# Patient Record
Sex: FEMALE | Race: BLACK OR AFRICAN AMERICAN | NOT HISPANIC OR LATINO | Employment: OTHER | URBAN - METROPOLITAN AREA
[De-identification: names, ages, dates, MRNs, and addresses within clinical notes are randomized per-mention and may not be internally consistent; named-entity substitution may affect disease eponyms.]

---

## 2017-01-25 RX ORDER — FELODIPINE 5 MG/1
TABLET, EXTENDED RELEASE ORAL
Qty: 30 TABLET | Refills: 0 | Status: SHIPPED | OUTPATIENT
Start: 2017-01-25 | End: 2017-02-07 | Stop reason: SDUPTHER

## 2017-02-03 DIAGNOSIS — I10 BENIGN ESSENTIAL HYPERTENSION: ICD-10-CM

## 2017-02-03 RX ORDER — POTASSIUM CHLORIDE 20MEQ/15ML
LIQUID (ML) ORAL
Qty: 240 ML | Refills: 0 | Status: SHIPPED | OUTPATIENT
Start: 2017-02-03 | End: 2017-02-07 | Stop reason: SDUPTHER

## 2017-02-07 ENCOUNTER — OFFICE VISIT (OUTPATIENT)
Dept: FAMILY MEDICINE CLINIC | Facility: CLINIC | Age: 72
End: 2017-02-07

## 2017-02-07 VITALS
WEIGHT: 155 LBS | SYSTOLIC BLOOD PRESSURE: 158 MMHG | HEIGHT: 65 IN | BODY MASS INDEX: 25.83 KG/M2 | HEART RATE: 75 BPM | DIASTOLIC BLOOD PRESSURE: 78 MMHG | TEMPERATURE: 97.8 F | OXYGEN SATURATION: 98 %

## 2017-02-07 DIAGNOSIS — J30.9 ATOPIC RHINITIS: Primary | ICD-10-CM

## 2017-02-07 DIAGNOSIS — J01.00 ACUTE NON-RECURRENT MAXILLARY SINUSITIS: ICD-10-CM

## 2017-02-07 PROCEDURE — 99213 OFFICE O/P EST LOW 20 MIN: CPT | Performed by: NURSE PRACTITIONER

## 2017-02-07 RX ORDER — AMOXICILLIN 500 MG/1
1000 CAPSULE ORAL 2 TIMES DAILY
Qty: 28 CAPSULE | Refills: 0 | Status: SHIPPED | OUTPATIENT
Start: 2017-02-07 | End: 2017-04-11

## 2017-02-07 NOTE — PROGRESS NOTES
"Subjective   Radha Funk is a 70 y.o. female who is here with a possible sinus infection.     History of Present Illness   3week history of sinus congestion, taking zyrtec and singular. +hot, cold , and aching, increased throat pain and sinus pressure.     Women in family live to near 100, she is afraid to go home to [facility], as afraid will not be able to return, though is a citizen.  The following portions of the patient's history were reviewed and updated as appropriate: allergies, current medications, past family history, past medical history, past social history, past surgical history and problem list.    Review of Systems   Constitutional: Negative for chills and fever.   HENT: Positive for congestion, rhinorrhea, sinus pressure, sore throat and voice change. Negative for ear discharge, ear pain, facial swelling, hearing loss, nosebleeds, sneezing and trouble swallowing.    Respiratory: Positive for cough. Negative for shortness of breath and wheezing.    Cardiovascular: Negative.  Negative for chest pain.   Gastrointestinal: Negative for abdominal pain.   Endocrine: Negative.    Musculoskeletal: Negative for arthralgias.   Allergic/Immunologic: Negative for environmental allergies.   Neurological: Positive for headaches.     Visit Vitals   • /78 (BP Location: Left arm, Patient Position: Sitting, Cuff Size: Adult)   • Pulse 75   • Temp 97.8 °F (36.6 °C) (Oral)   • Ht 65\" (165.1 cm)   • Wt 155 lb (70.3 kg)   • SpO2 98%   • BMI 25.79 kg/m2   140/76    Objective   Physical Exam   Constitutional: She is oriented to person, place, and time. She appears well-developed and well-nourished.   HENT:   Right Ear: Tympanic membrane is retracted. A middle ear effusion is present.   Left Ear: Tympanic membrane is bulging. Tympanic membrane is not retracted. A middle ear effusion is present.   Nose: Mucosal edema and rhinorrhea present.   Mouth/Throat: Posterior oropharyngeal edema present.   Eyes: EOM are " normal. Pupils are equal, round, and reactive to light.   Neck: Normal range of motion. Neck supple. No JVD present. No tracheal deviation present. No thyromegaly present.   Cardiovascular: Normal rate, regular rhythm and normal heart sounds.    Pulmonary/Chest: Effort normal and breath sounds normal.   Abdominal: Soft. Bowel sounds are normal.   Lymphadenopathy:     She has cervical adenopathy.   Neurological: She is alert and oriented to person, place, and time.   Skin: Skin is warm and dry.   Psychiatric: She has a normal mood and affect. Her behavior is normal. Judgment and thought content normal.   Nursing note and vitals reviewed.    Assessment/Plan   Problems Addressed this Visit        Respiratory    Atopic rhinitis - Primary      Other Visit Diagnoses     Acute non-recurrent maxillary sinusitis        Relevant Medications    amoxicillin (AMOXIL) 500 MG capsule        Continue zyrtec and singular, hold decongestants. Cover for sinusitis. FU if not settling. And in 2 months for diabetes.

## 2017-02-24 RX ORDER — FELODIPINE 5 MG/1
TABLET, EXTENDED RELEASE ORAL
Qty: 30 TABLET | Refills: 0 | Status: SHIPPED | OUTPATIENT
Start: 2017-02-24 | End: 2017-03-27 | Stop reason: SDUPTHER

## 2017-02-24 RX ORDER — METOPROLOL SUCCINATE 25 MG/1
TABLET, EXTENDED RELEASE ORAL
Qty: 30 TABLET | Refills: 0 | Status: SHIPPED | OUTPATIENT
Start: 2017-02-24 | End: 2017-03-27 | Stop reason: SDUPTHER

## 2017-03-27 RX ORDER — METOPROLOL SUCCINATE 25 MG/1
TABLET, EXTENDED RELEASE ORAL
Qty: 30 TABLET | Refills: 0 | Status: SHIPPED | OUTPATIENT
Start: 2017-03-27 | End: 2017-04-11 | Stop reason: SDUPTHER

## 2017-03-27 RX ORDER — FELODIPINE 5 MG/1
TABLET, EXTENDED RELEASE ORAL
Qty: 30 TABLET | Refills: 0 | Status: SHIPPED | OUTPATIENT
Start: 2017-03-27 | End: 2017-04-11 | Stop reason: SDUPTHER

## 2017-03-27 RX ORDER — LOSARTAN POTASSIUM AND HYDROCHLOROTHIAZIDE 25; 100 MG/1; MG/1
TABLET ORAL
Qty: 30 TABLET | Refills: 0 | Status: SHIPPED | OUTPATIENT
Start: 2017-03-27 | End: 2017-04-11 | Stop reason: SDUPTHER

## 2017-04-06 RX ORDER — CLOPIDOGREL BISULFATE 75 MG/1
TABLET ORAL
Qty: 30 TABLET | Refills: 2 | Status: SHIPPED | OUTPATIENT
Start: 2017-04-06 | End: 2017-04-11 | Stop reason: SDUPTHER

## 2017-04-11 ENCOUNTER — OFFICE VISIT (OUTPATIENT)
Dept: FAMILY MEDICINE CLINIC | Facility: CLINIC | Age: 72
End: 2017-04-11

## 2017-04-11 VITALS
WEIGHT: 156 LBS | HEIGHT: 65 IN | DIASTOLIC BLOOD PRESSURE: 64 MMHG | OXYGEN SATURATION: 98 % | HEART RATE: 61 BPM | TEMPERATURE: 98.2 F | BODY MASS INDEX: 25.99 KG/M2 | SYSTOLIC BLOOD PRESSURE: 138 MMHG

## 2017-04-11 DIAGNOSIS — Z00.00 WELCOME TO MEDICARE PREVENTIVE VISIT: ICD-10-CM

## 2017-04-11 DIAGNOSIS — E11.29 TYPE 2 DIABETES MELLITUS WITH MICROALBUMINURIA, WITHOUT LONG-TERM CURRENT USE OF INSULIN (HCC): Primary | ICD-10-CM

## 2017-04-11 DIAGNOSIS — R80.9 TYPE 2 DIABETES MELLITUS WITH MICROALBUMINURIA, WITHOUT LONG-TERM CURRENT USE OF INSULIN (HCC): Primary | ICD-10-CM

## 2017-04-11 DIAGNOSIS — L20.84 INTRINSIC ECZEMA: ICD-10-CM

## 2017-04-11 DIAGNOSIS — Z91.09 ENVIRONMENTAL ALLERGIES: ICD-10-CM

## 2017-04-11 DIAGNOSIS — E78.5 DYSLIPIDEMIA: ICD-10-CM

## 2017-04-11 PROCEDURE — G0402 INITIAL PREVENTIVE EXAM: HCPCS | Performed by: NURSE PRACTITIONER

## 2017-04-11 PROCEDURE — 99213 OFFICE O/P EST LOW 20 MIN: CPT | Performed by: NURSE PRACTITIONER

## 2017-04-11 RX ORDER — CLOPIDOGREL BISULFATE 75 MG/1
75 TABLET ORAL DAILY
Qty: 30 TABLET | Refills: 5 | Status: SHIPPED | OUTPATIENT
Start: 2017-04-11 | End: 2018-01-02 | Stop reason: SDUPTHER

## 2017-04-11 RX ORDER — MONTELUKAST SODIUM 10 MG/1
10 TABLET ORAL NIGHTLY
Qty: 90 TABLET | Refills: 5 | Status: SHIPPED | OUTPATIENT
Start: 2017-04-11 | End: 2018-05-29 | Stop reason: SDUPTHER

## 2017-04-11 RX ORDER — ATORVASTATIN CALCIUM 40 MG/1
40 TABLET, FILM COATED ORAL NIGHTLY
Qty: 30 TABLET | Refills: 5 | Status: SHIPPED | OUTPATIENT
Start: 2017-04-11 | End: 2017-11-16 | Stop reason: SDUPTHER

## 2017-04-11 RX ORDER — METOPROLOL SUCCINATE 25 MG/1
25 TABLET, EXTENDED RELEASE ORAL DAILY
Qty: 30 TABLET | Refills: 5 | Status: SHIPPED | OUTPATIENT
Start: 2017-04-11 | End: 2017-10-18 | Stop reason: SDUPTHER

## 2017-04-11 RX ORDER — LOSARTAN POTASSIUM AND HYDROCHLOROTHIAZIDE 25; 100 MG/1; MG/1
1 TABLET ORAL DAILY
Qty: 30 TABLET | Refills: 5 | Status: SHIPPED | OUTPATIENT
Start: 2017-04-11 | End: 2017-10-18 | Stop reason: SDUPTHER

## 2017-04-11 RX ORDER — FELODIPINE 5 MG/1
5 TABLET, EXTENDED RELEASE ORAL DAILY
Qty: 30 TABLET | Refills: 5 | Status: SHIPPED | OUTPATIENT
Start: 2017-04-11 | End: 2017-10-31 | Stop reason: SDUPTHER

## 2017-04-11 RX ORDER — SELENIUM SULFIDE 2.5 MG/100ML
LOTION TOPICAL DAILY PRN
Qty: 118 ML | Refills: 0 | Status: SHIPPED | OUTPATIENT
Start: 2017-04-11 | End: 2022-04-22

## 2017-04-11 NOTE — PROGRESS NOTES
"Subjective   Radha Funk is a 70 y.o. female who presents for a follow up on diabetes, hypertension.     History of Present Illness   Diabetes well controlled. Weight about the same, still working out, but less due to transportation issues. Started back at gym last week.   The following portions of the patient's history were reviewed and updated as appropriate: allergies, current medications, past family history, past medical history, past social history, past surgical history and problem list.    Review of Systems   Constitutional: Negative.  Negative for activity change and unexpected weight change.   HENT: Negative.    Eyes: Negative.  Negative for visual disturbance.   Respiratory: Negative.    Cardiovascular: Negative.  Negative for chest pain.   Gastrointestinal: Negative.  Negative for constipation and diarrhea.   Endocrine: Negative.    Genitourinary: Negative for difficulty urinating and frequency.   Musculoskeletal: Negative.    Neurological: Negative for weakness and headaches.   Hematological: Negative.    Psychiatric/Behavioral: Negative.  Negative for dysphoric mood.     /64  Pulse 61  Temp 98.2 °F (36.8 °C) (Oral)   Ht 65\" (165.1 cm)  Wt 156 lb (70.8 kg)  SpO2 98%  BMI 25.96 kg/m2    Objective   Physical Exam   Constitutional: She appears well-developed and well-nourished.   HENT:   Head: Normocephalic and atraumatic.   Neck: Normal range of motion. Neck supple. No thyromegaly present.   Cardiovascular: Normal rate, regular rhythm and normal heart sounds.    Pulses:       Carotid pulses are 2+ on the right side, and 2+ on the left side.  Pulmonary/Chest: Effort normal and breath sounds normal.    Radha had a diabetic foot exam performed today.   During the foot exam she had a monofilament test performed (see scanned image).  Lymphadenopathy:     She has no cervical adenopathy.   Skin: Skin is dry. She is not diaphoretic.   Scaly dark patch under chin to shoulder "   Psychiatric: She has a normal mood and affect. Her behavior is normal. Judgment and thought content normal.     Assessment/Plan   Problems Addressed this Visit        Endocrine    Diabetes mellitus - Primary    Relevant Medications    metFORMIN (GLUCOPHAGE) 500 MG tablet    Other Relevant Orders    Hemoglobin A1c    Microalbumin / Creatinine Urine Ratio      Other Visit Diagnoses     Intrinsic eczema        Relevant Medications    selenium sulfide (SELSUN) 2.5 % shampoo    Dyslipidemia        Relevant Medications    atorvastatin (LIPITOR) 40 MG tablet    Environmental allergies        Relevant Medications    montelukast (SINGULAIR) 10 MG tablet        Eczema well controlled years, with selenium from another provider. Reasonable to rx. DM and HTN well controlled. Continue same medications. FU 6 months

## 2017-04-11 NOTE — PATIENT INSTRUCTIONS
Medicare Wellness  Personal Prevention Plan of Service     Date of Office Visit:  2017  Encounter Provider:  ALONDRA Alfaro  Place of Service:  [facility]  Patient Name: Radha Funk  :  10/7/1946    As part of the Medicare Wellness portion of your visit today, we are providing you with this personalized preventive plan of services (PPPS). This plan is based upon recommendations of the [place] Preventive Services Task Force (USPSTF) and the Advisory Committee on Immunization Practices (ACIP).    This lists the preventive care services that should be considered, and provides dates of when you are due. Items listed as completed are up-to-date and do not require any further intervention.    Health Maintenance   Topic Date Due   • TDAP/TD VACCINES (1 - Tdap) 10/07/1967   • HEPATITIS C SCREENING  2016   • ZOSTER VACCINE  2016   • DIABETIC EYE EXAM  2016   • HEMOGLOBIN A1C  2017   • PNEUMOCOCCAL VACCINES (65+ LOW/MEDIUM RISK) (2 of 2 - PPSV23) 2017   • LIPID PANEL  2017   • URINE MICROALBUMIN  2017   • MEDICARE ANNUAL WELLNESS  2018   • DIABETIC FOOT EXAM  2018   • MAMMOGRAM  2018   • COLONOSCOPY  2026   • INFLUENZA VACCINE  Addressed       Orders Placed This Encounter   Procedures   • Hemoglobin A1c   • Microalbumin / Creatinine Urine Ratio       No Follow-up on file.

## 2017-04-11 NOTE — PROGRESS NOTES
QUICK REFERENCE INFORMATION:  The ABCs of the Annual Wellness Visit    Welcome to Medicare Visit    HEALTH RISK ASSESSMENT    10/7/1946    Recent Hospitalizations:  No recent hospitalization(s)..      Current Medical Providers:  Patient Care Team:  ALONDRA Mims as PCP - General  ALONDRA Mims as PCP - Family Medicine      Smoking Status:  History   Smoking Status   • Never Smoker   Smokeless Tobacco   • Not on file       Alcohol Consumption:  History   Alcohol use Not on file       Depression Screen:   PHQ-9 Depression Screening 4/11/2017   Little interest or pleasure in doing things 0   Feeling down, depressed, or hopeless 0   Trouble falling or staying asleep, or sleeping too much 0   Feeling tired or having little energy 0   Poor appetite or overeating 0   Feeling bad about yourself - or that you are a failure or have let yourself or your family down 0   Trouble concentrating on things, such as reading the newspaper or watching television 0   Moving or speaking so slowly that other people could have noticed. Or the opposite - being so fidgety or restless that you have been moving around a lot more than usual 0   Thoughts that you would be better off dead, or of hurting yourself in some way 0   PHQ-9 Total Score 0       Health Habits and Functional and Cognitive Screening:  Functional & Cognitive Status 4/11/2017   Do you have difficulty preparing food and eating? No   Do you have difficulty bathing yourself? No   Do you have difficulty getting dressed? No   Do you have difficulty using the toilet? No   Do you have difficulty moving around from place to place? No   In the past year have you fallen or experienced a near fall? No   Do you need help using the phone?  No   Are you deaf or do you have serious difficulty hearing?  No   Do you need help with transportation? No   Do you need help shopping? No   Do you need help preparing meals?  No   Do you need help with housework?  No   Do you need  help with laundry? No   Do you need help taking your medications? No   Do you need help managing money? No   Do you have difficulty concentrating, remembering or making decisions? No       Health Habits  Current Diet: Well Balanced Diet  Dental Exam: Up to date  Eye Exam: Not up to date  Exercise (times per week): 3 times per week  Current Exercise Activities Include: Cardiovasular Workout on Exercise Equipment        Does the patient have evidence of cognitive impairment? NO    Aspirin use counseling? Taking ASA appropriately as indicated      Recent Lab Results:  CMP:  Lab Results   Component Value Date    GLU 98 09/20/2016    BUN 12 09/20/2016    CREATININE 0.76 09/20/2016    EGFRIFNONA 81 09/20/2016    EGFRIFAFRI 94 09/20/2016    BCR 16 09/20/2016     09/20/2016    K 3.9 09/20/2016    CO2 25 09/20/2016    CALCIUM 9.7 09/20/2016    PROTENTOTREF 8.6 (H) 09/20/2016    ALBUMIN 4.5 09/20/2016    LABGLOBREF 4.1 09/20/2016    LABIL2 1.1 09/20/2016    BILITOT 0.6 09/20/2016    ALKPHOS 78 09/20/2016    AST 37 09/20/2016    ALT 27 09/20/2016     Lipid Panel:  Lab Results   Component Value Date    CHLPL 115 09/20/2016    TRIG 148 09/20/2016    HDL 36 (L) 09/20/2016    VLDL 30 09/20/2016    LDL 49 09/20/2016     HbA1c:  Lab Results   Component Value Date    HGBA1C 6.2 (H) 09/20/2016       Visual Acuity:   Visual Acuity Screening    Right eye Left eye Both eyes   Without correction:      With correction: 20/25 20/30 20/25       Age-appropriate Screening Schedule:  Refer to the list below for future screening recommendations based on patient's age, sex and/or medical conditions. Orders for these recommended tests are listed in the plan section. The patient has been provided with a written plan.    Health Maintenance   Topic Date Due   • TDAP/TD VACCINES (1 - Tdap) 10/07/1967   • ZOSTER VACCINE  03/02/2016   • DIABETIC EYE EXAM  03/03/2016   • HEMOGLOBIN A1C  03/20/2017   • PNEUMOCOCCAL VACCINES (65+ LOW/MEDIUM RISK) (2  of 2 - PPSV23) 09/20/2017   • LIPID PANEL  09/20/2017   • URINE MICROALBUMIN  09/20/2017   • DIABETIC FOOT EXAM  04/11/2018   • MAMMOGRAM  09/08/2018   • COLONOSCOPY  09/20/2026   • INFLUENZA VACCINE  Addressed        Subjective   History of Present Illness    Radha Funk is a 70 y.o. female an established patient presenting for a Welcome to Medicare Visit.     The following portions of the patient's history were reviewed and updated as appropriate: allergies, current medications, past family history, past medical history, past social history, past surgical history and problem list.    Outpatient Medications Prior to Visit   Medication Sig Dispense Refill   • ARTIFICIAL TEARS 0.1-0.3 % solution Apply  to eye.     • aspirin 81 MG chewable tablet Chew daily.     • Cetirizine HCl 10 MG capsule Take  by mouth.     • atorvastatin (LIPITOR) 40 MG tablet TAKE ONE TABLET BY MOUTH ONCE DAILY AT BEDTIME 30 tablet 5   • clopidogrel (PLAVIX) 75 MG tablet TAKE ONE TABLET BY MOUTH ONCE DAILY 30 tablet 2   • felodipine (PLENDIL) 5 MG 24 hr tablet TAKE ONE TABLET BY MOUTH ONCE DAILY NEED  BLOOD  PRESSURE  CHECK 30 tablet 0   • losartan-hydrochlorothiazide (HYZAAR) 100-25 MG per tablet TAKE ONE TABLET BY MOUTH ONCE DAILY FOR BLOOD PRESSURE 30 tablet 0   • metFORMIN (GLUCOPHAGE) 500 MG tablet TAKE ONE TABLET BY MOUTH EVERY 12 HOURS WITH FOOD 60 tablet 0   • metoprolol succinate XL (TOPROL-XL) 25 MG 24 hr tablet TAKE ONE TABLET BY MOUTH ONCE DAILY 30 tablet 0   • montelukast (SINGULAIR) 10 MG tablet Take 1 tablet by mouth every night. 90 tablet 5   • amoxicillin (AMOXIL) 500 MG capsule Take 2 capsules by mouth 2 (Two) Times a Day. 28 capsule 0   • felodipine (PLENDIL) 5 MG 24 hr tablet TAKE ONE TABLET BY MOUTH ONCE DAILY *  NEED  BLOOD  PRESSURE  CHECK 30 tablet 0     No facility-administered medications prior to visit.        Patient Active Problem List   Diagnosis   • Atopic rhinitis   • Acute otitis media   • Bell's  "palsy   • Benign essential hypertension   • Cerebral atherosclerosis   • Diabetes mellitus   • Familial hypertriglyceridemia   • Low back pain   • Palpitations       Advance Care Planning:  has NO advance directive - information provided to the patient today    Identification of Risk Factors:  Risk factors include: cardiovascular risk.    Review of Systems    Compared to one year ago, the patient feels her physical health is the same.  Compared to one year ago, the patient feels her mental health is the same.    Objective    Physical Exam    Vitals:    04/11/17 1003   BP: 138/64   Pulse: 61   Temp: 98.2 °F (36.8 °C)   TempSrc: Oral   SpO2: 98%   Weight: 156 lb (70.8 kg)   Height: 65\" (165.1 cm)   PainSc: 0-No pain       Body mass index is 25.96 kg/(m^2).  Discussed the patient's BMI with her. The BMI is in the acceptable range.    Procedure   Procedures       Assessment/Plan   Patient Self-Management and Personalized Health Advice  The patient has been provided with information about: diet and exercise and preventive services including:   · Counseling for cardiovascular disease risk reduction.    Visit Diagnoses:    ICD-10-CM ICD-9-CM   1. Type 2 diabetes mellitus with microalbuminuria, without long-term current use of insulin E11.29 250.40    R80.9 791.0   2. Intrinsic eczema L20.84 691.8   3. Dyslipidemia E78.5 272.4   4. Environmental allergies Z91.09 V15.09   5. Welcome to Medicare preventive visit Z00.00 V70.0       Orders Placed This Encounter   Procedures   • Hemoglobin A1c   • Microalbumin / Creatinine Urine Ratio       Outpatient Encounter Prescriptions as of 4/11/2017   Medication Sig Dispense Refill   • ARTIFICIAL TEARS 0.1-0.3 % solution Apply  to eye.     • aspirin 81 MG chewable tablet Chew daily.     • atorvastatin (LIPITOR) 40 MG tablet Take 1 tablet by mouth Every Night. 30 tablet 5   • Cetirizine HCl 10 MG capsule Take  by mouth.     • clopidogrel (PLAVIX) 75 MG tablet Take 1 tablet by mouth Daily. " 30 tablet 5   • felodipine (PLENDIL) 5 MG 24 hr tablet Take 1 tablet by mouth Daily. 30 tablet 5   • losartan-hydrochlorothiazide (HYZAAR) 100-25 MG per tablet Take 1 tablet by mouth Daily. 30 tablet 5   • metFORMIN (GLUCOPHAGE) 500 MG tablet Take 1 tablet by mouth 2 (Two) Times a Day With Meals. 60 tablet 5   • metoprolol succinate XL (TOPROL-XL) 25 MG 24 hr tablet Take 1 tablet by mouth Daily. 30 tablet 5   • montelukast (SINGULAIR) 10 MG tablet Take 1 tablet by mouth Every Night. 90 tablet 5   • [DISCONTINUED] atorvastatin (LIPITOR) 40 MG tablet TAKE ONE TABLET BY MOUTH ONCE DAILY AT BEDTIME 30 tablet 5   • [DISCONTINUED] clopidogrel (PLAVIX) 75 MG tablet TAKE ONE TABLET BY MOUTH ONCE DAILY 30 tablet 2   • [DISCONTINUED] felodipine (PLENDIL) 5 MG 24 hr tablet TAKE ONE TABLET BY MOUTH ONCE DAILY NEED  BLOOD  PRESSURE  CHECK 30 tablet 0   • [DISCONTINUED] losartan-hydrochlorothiazide (HYZAAR) 100-25 MG per tablet TAKE ONE TABLET BY MOUTH ONCE DAILY FOR BLOOD PRESSURE 30 tablet 0   • [DISCONTINUED] metFORMIN (GLUCOPHAGE) 500 MG tablet TAKE ONE TABLET BY MOUTH EVERY 12 HOURS WITH FOOD 60 tablet 0   • [DISCONTINUED] metoprolol succinate XL (TOPROL-XL) 25 MG 24 hr tablet TAKE ONE TABLET BY MOUTH ONCE DAILY 30 tablet 0   • [DISCONTINUED] montelukast (SINGULAIR) 10 MG tablet Take 1 tablet by mouth every night. 90 tablet 5   • selenium sulfide (SELSUN) 2.5 % shampoo Apply  topically Daily As Needed for Irritation or Itching. 118 mL 0   • [DISCONTINUED] amoxicillin (AMOXIL) 500 MG capsule Take 2 capsules by mouth 2 (Two) Times a Day. 28 capsule 0   • [DISCONTINUED] felodipine (PLENDIL) 5 MG 24 hr tablet TAKE ONE TABLET BY MOUTH ONCE DAILY *  NEED  BLOOD  PRESSURE  CHECK 30 tablet 0     No facility-administered encounter medications on file as of 4/11/2017.        Reviewed use of high risk medication in the elderly: yes  Reviewed for potential of harmful drug interactions in the elderly: yes    Follow Up:  No Follow-up on  file.     An After Visit Summary and PPPS with all of these plans were given to the patient.

## 2017-04-12 LAB
ALBUMIN/CREAT UR: 54.8 MG/G CREAT (ref 0–30)
CREAT UR-MCNC: 20.8 MG/DL
HBA1C MFR BLD: 5.9 % (ref 4.8–5.6)
MICROALBUMIN UR-MCNC: 11.4 UG/ML

## 2017-04-12 NOTE — PROGRESS NOTES
Please call the patient regarding her abnormal result. Less protein in the urine, continue tight blood pressure and blood sugar control. Good work. FU 6 months

## 2017-04-13 ENCOUNTER — TELEPHONE (OUTPATIENT)
Dept: FAMILY MEDICINE CLINIC | Facility: CLINIC | Age: 72
End: 2017-04-13

## 2017-04-13 NOTE — TELEPHONE ENCOUNTER
----- Message from ALONDRA Mims sent at 4/12/2017  4:30 PM EDT -----  Please call the patient regarding her abnormal result. Less protein in the urine, continue tight blood pressure and blood sugar control. Good work. FU 6 months

## 2017-05-25 DIAGNOSIS — I10 BENIGN ESSENTIAL HYPERTENSION: ICD-10-CM

## 2017-05-25 RX ORDER — POTASSIUM CHLORIDE 20MEQ/15ML
LIQUID (ML) ORAL
Refills: 0 | OUTPATIENT
Start: 2017-05-25

## 2017-06-30 DIAGNOSIS — I10 BENIGN ESSENTIAL HYPERTENSION: ICD-10-CM

## 2017-06-30 RX ORDER — POTASSIUM CHLORIDE 20MEQ/15ML
LIQUID (ML) ORAL
Qty: 240 ML | Refills: 0 | Status: SHIPPED | OUTPATIENT
Start: 2017-06-30 | End: 2017-09-05 | Stop reason: SDUPTHER

## 2017-08-28 ENCOUNTER — TRANSCRIBE ORDERS (OUTPATIENT)
Dept: ADMINISTRATIVE | Facility: HOSPITAL | Age: 72
End: 2017-08-28

## 2017-08-28 DIAGNOSIS — Z12.31 VISIT FOR SCREENING MAMMOGRAM: Primary | ICD-10-CM

## 2017-09-01 ENCOUNTER — HOSPITAL ENCOUNTER (OUTPATIENT)
Dept: MAMMOGRAPHY | Facility: HOSPITAL | Age: 72
Discharge: HOME | End: 2017-09-01
Admitting: NURSE PRACTITIONER

## 2017-09-01 ENCOUNTER — TELEPHONE (OUTPATIENT)
Dept: FAMILY MEDICINE CLINIC | Facility: CLINIC | Age: 72
End: 2017-09-01

## 2017-09-01 DIAGNOSIS — Z12.31 VISIT FOR SCREENING MAMMOGRAM: ICD-10-CM

## 2017-09-01 PROCEDURE — G0202 SCR MAMMO BI INCL CAD: HCPCS

## 2017-09-01 PROCEDURE — 77063 BREAST TOMOSYNTHESIS BI: CPT

## 2017-09-05 DIAGNOSIS — I10 BENIGN ESSENTIAL HYPERTENSION: ICD-10-CM

## 2017-09-05 RX ORDER — POTASSIUM CHLORIDE 20MEQ/15ML
LIQUID (ML) ORAL
Qty: 240 ML | Refills: 0 | Status: SHIPPED | OUTPATIENT
Start: 2017-09-05 | End: 2017-10-31 | Stop reason: SDUPTHER

## 2017-09-27 ENCOUNTER — OFFICE VISIT (OUTPATIENT)
Dept: FAMILY MEDICINE CLINIC | Facility: CLINIC | Age: 72
End: 2017-09-27

## 2017-09-27 VITALS
DIASTOLIC BLOOD PRESSURE: 74 MMHG | HEART RATE: 56 BPM | BODY MASS INDEX: 24.49 KG/M2 | OXYGEN SATURATION: 99 % | HEIGHT: 65 IN | TEMPERATURE: 98.3 F | SYSTOLIC BLOOD PRESSURE: 128 MMHG | WEIGHT: 147 LBS

## 2017-09-27 DIAGNOSIS — M26.622 ARTHRALGIA OF LEFT TEMPOROMANDIBULAR JOINT: ICD-10-CM

## 2017-09-27 DIAGNOSIS — M19.049 HAND ARTHRITIS: Primary | ICD-10-CM

## 2017-09-27 PROCEDURE — 99213 OFFICE O/P EST LOW 20 MIN: CPT | Performed by: NURSE PRACTITIONER

## 2017-09-27 NOTE — PROGRESS NOTES
"Subjective   Radha Funk is a 71 y.o. female who presents c/o watery eyes, pain on left side of face and in ear x 1 week.     History of Present Illness   Reports ear and side of jaw pain x 1 week. No URI symptoms, no unilateral weakness. Worried as had bells palsy (R) last year.     Also some arthritis in hands. 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   The following portions of the patient's history were reviewed and updated as appropriate: allergies, current medications, past family history, past medical history, past social history, past surgical history and problem list.    Review of Systems   Constitutional: Negative for chills and fever.   HENT: Positive for dental problem (due to have partial checked. ) and ear pain. Negative for congestion, mouth sores, rhinorrhea and sinus pressure.    Respiratory: Negative.    Cardiovascular: Negative.    Musculoskeletal: Positive for arthralgias.   Skin: Negative.    Allergic/Immunologic: Negative.    Neurological: Negative.    Hematological: Negative.    Psychiatric/Behavioral: Negative.      /74  Pulse 56  Temp 98.3 °F (36.8 °C) (Oral)   Ht 65\" (165.1 cm)  Wt 147 lb (66.7 kg)  SpO2 99%  BMI 24.46 kg/m2    Objective   Physical Exam   Constitutional: She appears well-developed and well-nourished.   HENT:   Right Ear: Tympanic membrane normal.   Left Ear: Tympanic membrane normal.   Nose: No mucosal edema or rhinorrhea. Right sinus exhibits no maxillary sinus tenderness and no frontal sinus tenderness. Left sinus exhibits no maxillary sinus tenderness and no frontal sinus tenderness.   Mouth/Throat: Uvula is midline, oropharynx is clear and moist and mucous membranes are normal. No oropharyngeal exudate.   TMJ tender with crepitus.    Neck: Carotid bruit is not present. No thyroid mass present.   Cardiovascular: Normal rate, regular rhythm and normal heart sounds.    Pulmonary/Chest: " Effort normal and breath sounds normal.   Psychiatric: She has a normal mood and affect. Her behavior is normal. Judgment and thought content normal.   Nursing note and vitals reviewed.    Assessment/Plan   Problems Addressed this Visit     None      Visit Diagnoses     Hand arthritis    -  Primary    Relevant Medications    diclofenac (VOLTAREN) 1 % gel gel    Arthralgia of left temporomandibular joint            Apply heat to hands and TMJ, check with dentist regarding teeth alignment. FU if not settling 2 weeks.

## 2017-10-18 RX ORDER — METOPROLOL SUCCINATE 25 MG/1
TABLET, EXTENDED RELEASE ORAL
Qty: 30 TABLET | Refills: 5 | Status: SHIPPED | OUTPATIENT
Start: 2017-10-18 | End: 2018-04-20 | Stop reason: SDUPTHER

## 2017-10-18 RX ORDER — LOSARTAN POTASSIUM AND HYDROCHLOROTHIAZIDE 25; 100 MG/1; MG/1
TABLET ORAL
Qty: 30 TABLET | Refills: 5 | Status: SHIPPED | OUTPATIENT
Start: 2017-10-18 | End: 2018-02-19

## 2017-10-31 DIAGNOSIS — I10 BENIGN ESSENTIAL HYPERTENSION: ICD-10-CM

## 2017-10-31 RX ORDER — POTASSIUM CHLORIDE 20MEQ/15ML
LIQUID (ML) ORAL
Qty: 240 ML | Refills: 1 | Status: SHIPPED | OUTPATIENT
Start: 2017-10-31 | End: 2018-02-14 | Stop reason: SDUPTHER

## 2017-10-31 RX ORDER — FELODIPINE 5 MG/1
TABLET, EXTENDED RELEASE ORAL
Qty: 30 TABLET | Refills: 5 | Status: SHIPPED | OUTPATIENT
Start: 2017-10-31 | End: 2018-04-03 | Stop reason: SDUPTHER

## 2017-11-16 DIAGNOSIS — E78.5 DYSLIPIDEMIA: ICD-10-CM

## 2017-11-16 RX ORDER — ATORVASTATIN CALCIUM 40 MG/1
TABLET, FILM COATED ORAL
Qty: 30 TABLET | Refills: 5 | Status: SHIPPED | OUTPATIENT
Start: 2017-11-16 | End: 2018-03-19 | Stop reason: SDUPTHER

## 2017-12-05 ENCOUNTER — OFFICE VISIT (OUTPATIENT)
Dept: FAMILY MEDICINE CLINIC | Facility: CLINIC | Age: 72
End: 2017-12-05

## 2017-12-05 VITALS
HEART RATE: 51 BPM | DIASTOLIC BLOOD PRESSURE: 64 MMHG | HEIGHT: 65 IN | TEMPERATURE: 97.6 F | WEIGHT: 146 LBS | BODY MASS INDEX: 24.32 KG/M2 | OXYGEN SATURATION: 98 % | SYSTOLIC BLOOD PRESSURE: 138 MMHG

## 2017-12-05 DIAGNOSIS — E78.1 FAMILIAL HYPERTRIGLYCERIDEMIA: ICD-10-CM

## 2017-12-05 DIAGNOSIS — M67.431 GANGLION CYST OF VOLAR ASPECT OF RIGHT WRIST: ICD-10-CM

## 2017-12-05 DIAGNOSIS — E11.29 TYPE 2 DIABETES MELLITUS WITH MICROALBUMINURIA, WITHOUT LONG-TERM CURRENT USE OF INSULIN (HCC): Primary | ICD-10-CM

## 2017-12-05 DIAGNOSIS — R80.9 TYPE 2 DIABETES MELLITUS WITH MICROALBUMINURIA, WITHOUT LONG-TERM CURRENT USE OF INSULIN (HCC): Primary | ICD-10-CM

## 2017-12-05 PROCEDURE — 99214 OFFICE O/P EST MOD 30 MIN: CPT | Performed by: NURSE PRACTITIONER

## 2017-12-05 NOTE — PROGRESS NOTES
Subjective   Radha Acosta is a 72 y.o. female who presents for a follow up on hypertension, diabetes.     Diabetes   She presents for her follow-up diabetic visit. She has type 2 diabetes mellitus. Her disease course has been improving. There are no hypoglycemic associated symptoms. Pertinent negatives for hypoglycemia include no headaches. There are no diabetic associated symptoms. Pertinent negatives for diabetes include no chest pain and no weakness. There are no hypoglycemic complications. Symptoms are stable. There are no diabetic complications. Risk factors for coronary artery disease include post-menopausal, diabetes mellitus and dyslipidemia. Current diabetic treatment includes oral agent (monotherapy). She is compliant with treatment all of the time. Her weight is stable. She is following a generally healthy diet. Meal planning includes avoidance of concentrated sweets. She has not had a previous visit with a dietitian. She participates in exercise every other day. She monitors urine at home <1 x per month. There is no change in her home blood glucose trend. An ACE inhibitor/angiotensin II receptor blocker is being taken. She does not see a podiatrist.Eye exam is not current.      The following portions of the patient's history were reviewed and updated as appropriate: allergies, current medications, past family history, past medical history, past social history, past surgical history and problem list.    Review of Systems   Constitutional: Negative.  Negative for activity change and unexpected weight change.   HENT: Negative.    Eyes: Negative.  Negative for visual disturbance.   Respiratory: Negative.    Cardiovascular: Negative.  Negative for chest pain.   Gastrointestinal: Negative.  Negative for constipation and diarrhea.   Endocrine: Negative.    Genitourinary: Negative for difficulty urinating and frequency.   Musculoskeletal: Positive for arthralgias (hands with am stiffness, using  "stress balls to address. ).   Skin:        Knot on wrist x 1 week, tender   Neurological: Negative for weakness and headaches.   Hematological: Negative.    Psychiatric/Behavioral: Negative.  Negative for dysphoric mood.     /64  Pulse 51  Temp 97.6 °F (36.4 °C) (Oral)   Ht 165.1 cm (65\")  Wt 66.2 kg (146 lb)  SpO2 98%  BMI 24.3 kg/m2    Objective   Physical Exam   Constitutional: She appears well-developed and well-nourished.   HENT:   Head: Normocephalic and atraumatic.   Neck: Normal range of motion. Neck supple. No thyromegaly present.   Cardiovascular: Normal rate, regular rhythm and normal heart sounds.    Pulses:       Carotid pulses are 2+ on the right side, and 2+ on the left side.  Pulmonary/Chest: Effort normal and breath sounds normal.    Radha had a diabetic foot exam performed today.   During the foot exam she had a monofilament test performed (see scanned).  Lymphadenopathy:     She has no cervical adenopathy.   Skin: She is not diaphoretic.   R wrist ganglion cyst 5mm   Psychiatric: She has a normal mood and affect. Her behavior is normal. Judgment and thought content normal.   Nursing note and vitals reviewed.    Assessment/Plan   Problems Addressed this Visit        Cardiovascular and Mediastinum    Familial hypertriglyceridemia    Relevant Orders    Lipid Panel       Endocrine    Diabetes mellitus - Primary    Relevant Orders    CBC & Differential    Comprehensive Metabolic Panel    Hemoglobin A1c      Other Visit Diagnoses     Ganglion cyst of volar aspect of right wrist            Would like to come off metformin if blood sugar well controlled. Continue exercise.   HTN well controlled today, continue same medications.   Encouraged to update diabetic eye exam, discussed importance.   Declines flu vaccine.   Wait for settling of ganglion pain, hand surgery if not settling.   Follow up 6 months, sooner if needed.        "

## 2017-12-06 ENCOUNTER — TELEPHONE (OUTPATIENT)
Dept: FAMILY MEDICINE CLINIC | Facility: CLINIC | Age: 72
End: 2017-12-06

## 2017-12-06 DIAGNOSIS — R94.5 ABNORMAL LIVER FUNCTION: Primary | ICD-10-CM

## 2017-12-06 LAB
ALBUMIN SERPL-MCNC: 3.6 G/DL (ref 3.5–5.2)
ALBUMIN/GLOB SERPL: 0.7 G/DL
ALP SERPL-CCNC: 89 U/L (ref 39–117)
ALT SERPL-CCNC: 82 U/L (ref 1–33)
AST SERPL-CCNC: 70 U/L (ref 1–32)
BASOPHILS # BLD AUTO: 0.03 10*3/MM3 (ref 0–0.2)
BASOPHILS NFR BLD AUTO: 0.4 % (ref 0–1.5)
BILIRUB SERPL-MCNC: 0.7 MG/DL (ref 0.1–1.2)
BUN SERPL-MCNC: 10 MG/DL (ref 8–23)
BUN/CREAT SERPL: 13.7 (ref 7–25)
CALCIUM SERPL-MCNC: 9.1 MG/DL (ref 8.6–10.5)
CHLORIDE SERPL-SCNC: 93 MMOL/L (ref 98–107)
CHOLEST SERPL-MCNC: 92 MG/DL (ref 0–200)
CO2 SERPL-SCNC: 29.2 MMOL/L (ref 22–29)
CREAT SERPL-MCNC: 0.73 MG/DL (ref 0.57–1)
EOSINOPHIL # BLD AUTO: 0.39 10*3/MM3 (ref 0–0.7)
EOSINOPHIL NFR BLD AUTO: 4.7 % (ref 0.3–6.2)
ERYTHROCYTE [DISTWIDTH] IN BLOOD BY AUTOMATED COUNT: 15.6 % (ref 11.7–13)
GFR SERPLBLD CREATININE-BSD FMLA CKD-EPI: 79 ML/MIN/1.73
GFR SERPLBLD CREATININE-BSD FMLA CKD-EPI: 96 ML/MIN/1.73
GLOBULIN SER CALC-MCNC: 5.2 GM/DL
GLUCOSE SERPL-MCNC: 83 MG/DL (ref 65–99)
HBA1C MFR BLD: 6.1 % (ref 4.8–5.6)
HCT VFR BLD AUTO: 39 % (ref 35.6–45.5)
HDLC SERPL-MCNC: 33 MG/DL (ref 40–60)
HGB BLD-MCNC: 12.9 G/DL (ref 11.9–15.5)
IMM GRANULOCYTES # BLD: 0.03 10*3/MM3 (ref 0–0.03)
IMM GRANULOCYTES NFR BLD: 0.4 % (ref 0–0.5)
LDLC SERPL CALC-MCNC: 45 MG/DL (ref 0–100)
LYMPHOCYTES # BLD AUTO: 1.82 10*3/MM3 (ref 0.9–4.8)
LYMPHOCYTES NFR BLD AUTO: 22 % (ref 19.6–45.3)
MCH RBC QN AUTO: 30.1 PG (ref 26.9–32)
MCHC RBC AUTO-ENTMCNC: 33.1 G/DL (ref 32.4–36.3)
MCV RBC AUTO: 91.1 FL (ref 80.5–98.2)
MONOCYTES # BLD AUTO: 1.01 10*3/MM3 (ref 0.2–1.2)
MONOCYTES NFR BLD AUTO: 12.2 % (ref 5–12)
NEUTROPHILS # BLD AUTO: 4.98 10*3/MM3 (ref 1.9–8.1)
NEUTROPHILS NFR BLD AUTO: 60.3 % (ref 42.7–76)
PLATELET # BLD AUTO: 255 10*3/MM3 (ref 140–500)
POTASSIUM SERPL-SCNC: 3.7 MMOL/L (ref 3.5–5.2)
PROT SERPL-MCNC: 8.8 G/DL (ref 6–8.5)
RBC # BLD AUTO: 4.28 10*6/MM3 (ref 3.9–5.2)
SODIUM SERPL-SCNC: 136 MMOL/L (ref 136–145)
TRIGL SERPL-MCNC: 69 MG/DL (ref 0–150)
VLDLC SERPL CALC-MCNC: 13.8 MG/DL (ref 5–40)
WBC # BLD AUTO: 8.26 10*3/MM3 (ref 4.5–10.7)

## 2017-12-06 NOTE — PROGRESS NOTES
Please call the patient regarding her abnormal result. Blood sugar in prediabetes range, 6.1, could consider coming off metformin for 6 months, attention to diet and exercise. Potassium and cholesterol are in the good range. Liver functions mildly abnormal. Recheck CMP 1 month

## 2017-12-06 NOTE — TELEPHONE ENCOUNTER
----- Message from ALONDRA Mims sent at 12/6/2017  8:25 AM EST -----  Please call the patient regarding her abnormal result. Blood sugar in prediabetes range, 6.1, could consider coming off metformin for 6 months, attention to diet and exercise. Potassium and cholesterol are in the good range. Liver functions mildly abnormal. Recheck CMP 1 month

## 2017-12-06 NOTE — TELEPHONE ENCOUNTER
Patient is worried about abnormal liver function and wants to know if there is anything she should do or avoid doing? She has been taking aleve and aspirin for hand pain. No tylenol usage and does not drink alcohol.

## 2017-12-06 NOTE — TELEPHONE ENCOUNTER
Continue to avoid alcohol, otherwise no restrictions. Often mild elevation comes from fatty liver, practice low fat diet

## 2017-12-11 ENCOUNTER — RESULTS ENCOUNTER (OUTPATIENT)
Dept: FAMILY MEDICINE CLINIC | Facility: CLINIC | Age: 72
End: 2017-12-11

## 2017-12-11 DIAGNOSIS — R94.5 ABNORMAL LIVER FUNCTION: ICD-10-CM

## 2018-01-02 RX ORDER — CLOPIDOGREL BISULFATE 75 MG/1
TABLET ORAL
Qty: 30 TABLET | Refills: 5 | Status: SHIPPED | OUTPATIENT
Start: 2018-01-02 | End: 2018-07-02 | Stop reason: SDUPTHER

## 2018-02-06 ENCOUNTER — OFFICE VISIT (OUTPATIENT)
Dept: FAMILY MEDICINE CLINIC | Facility: CLINIC | Age: 73
End: 2018-02-06

## 2018-02-06 VITALS
HEART RATE: 79 BPM | DIASTOLIC BLOOD PRESSURE: 80 MMHG | WEIGHT: 150 LBS | BODY MASS INDEX: 24.99 KG/M2 | SYSTOLIC BLOOD PRESSURE: 158 MMHG | HEIGHT: 65 IN | OXYGEN SATURATION: 97 % | TEMPERATURE: 98.6 F

## 2018-02-06 DIAGNOSIS — H66.001 ACUTE SUPPURATIVE OTITIS MEDIA OF RIGHT EAR WITHOUT SPONTANEOUS RUPTURE OF TYMPANIC MEMBRANE, RECURRENCE NOT SPECIFIED: ICD-10-CM

## 2018-02-06 DIAGNOSIS — I10 BENIGN ESSENTIAL HYPERTENSION: ICD-10-CM

## 2018-02-06 DIAGNOSIS — R68.89 FLU-LIKE SYMPTOMS: Primary | ICD-10-CM

## 2018-02-06 LAB
ALBUMIN SERPL-MCNC: 3.6 G/DL (ref 3.5–5.2)
ALBUMIN/GLOB SERPL: 0.7 G/DL
ALP SERPL-CCNC: 88 U/L (ref 39–117)
ALT SERPL-CCNC: 80 U/L (ref 1–33)
AST SERPL-CCNC: 84 U/L (ref 1–32)
BILIRUB SERPL-MCNC: 0.8 MG/DL (ref 0.1–1.2)
BUN SERPL-MCNC: 8 MG/DL (ref 8–23)
BUN/CREAT SERPL: 11 (ref 7–25)
CALCIUM SERPL-MCNC: 9.3 MG/DL (ref 8.6–10.5)
CHLORIDE SERPL-SCNC: 84 MMOL/L (ref 98–107)
CO2 SERPL-SCNC: 29.8 MMOL/L (ref 22–29)
CREAT SERPL-MCNC: 0.73 MG/DL (ref 0.57–1)
EXPIRATION DATE: NORMAL
FLUAV AG NPH QL: NEGATIVE
FLUBV AG NPH QL: NEGATIVE
GFR SERPLBLD CREATININE-BSD FMLA CKD-EPI: 79 ML/MIN/1.73
GFR SERPLBLD CREATININE-BSD FMLA CKD-EPI: 96 ML/MIN/1.73
GLOBULIN SER CALC-MCNC: 5.3 GM/DL
GLUCOSE SERPL-MCNC: 116 MG/DL (ref 65–99)
INTERNAL CONTROL: NORMAL
Lab: NORMAL
POTASSIUM SERPL-SCNC: 3 MMOL/L (ref 3.5–5.2)
PROT SERPL-MCNC: 8.9 G/DL (ref 6–8.5)
SODIUM SERPL-SCNC: 125 MMOL/L (ref 136–145)

## 2018-02-06 PROCEDURE — 87804 INFLUENZA ASSAY W/OPTIC: CPT | Performed by: NURSE PRACTITIONER

## 2018-02-06 PROCEDURE — 99213 OFFICE O/P EST LOW 20 MIN: CPT | Performed by: NURSE PRACTITIONER

## 2018-02-06 RX ORDER — AMOXICILLIN 500 MG/1
1000 CAPSULE ORAL 2 TIMES DAILY
Qty: 28 CAPSULE | Refills: 0 | Status: SHIPPED | OUTPATIENT
Start: 2018-02-06 | End: 2018-06-14

## 2018-02-06 RX ORDER — IBUPROFEN 800 MG/1
800 TABLET ORAL EVERY 6 HOURS PRN
Qty: 21 TABLET | Refills: 0 | Status: SHIPPED | OUTPATIENT
Start: 2018-02-06 | End: 2018-05-07

## 2018-02-06 RX ORDER — AMOXICILLIN 500 MG/1
1000 CAPSULE ORAL 2 TIMES DAILY
Qty: 28 CAPSULE | Refills: 0 | COMMUNITY
End: 2018-02-06 | Stop reason: SDUPTHER

## 2018-02-06 NOTE — PROGRESS NOTES
"Carloz Acosta is a 72 y.o. female who presents c/o fever, chills, body aches x 4 days.     History of Present Illness   Was exposed to FLU B by granddaughter, symptoms of scratchy throat, body aches, and temp since Sunday (2 1/2 days). +cough  The following portions of the patient's history were reviewed and updated as appropriate: allergies, current medications, past family history, past medical history, past social history, past surgical history and problem list.    Review of Systems   Constitutional: Positive for chills and fever (100.4).   HENT: Positive for congestion and sore throat. Negative for ear pain.    Eyes: Negative.    Respiratory: Positive for cough (nonproductive).    Cardiovascular: Negative.    Gastrointestinal: Negative for diarrhea.   Endocrine: Positive for polyuria.   Genitourinary: Negative.    Musculoskeletal: Positive for arthralgias.   Skin: Negative.    Allergic/Immunologic: Negative.    Neurological: Positive for headaches.   Hematological: Negative.    Psychiatric/Behavioral: Negative.      /80  Pulse 79  Temp 98.6 °F (37 °C) (Oral)   Ht 165.1 cm (65\")  Wt 68 kg (150 lb)  SpO2 97%  BMI 24.96 kg/m2    Objective   Physical Exam   Constitutional: She appears well-developed and well-nourished.   HENT:   Right Ear: Tympanic membrane is bulging. A middle ear effusion is present.   Left Ear:  No middle ear effusion.   Nose: Mucosal edema and rhinorrhea present.   Mouth/Throat: Uvula is midline, oropharynx is clear and moist and mucous membranes are normal.   Neck: Normal range of motion. Neck supple. No tracheal deviation present. No thyromegaly present.   Cardiovascular: Normal rate, regular rhythm and normal heart sounds.    Pulmonary/Chest: Effort normal and breath sounds normal.   Lymphadenopathy:     She has no cervical adenopathy.   Skin: Skin is warm and dry.   Psychiatric: She has a normal mood and affect. Her behavior is normal. Judgment and thought " content normal.   Nursing note and vitals reviewed.    Assessment/Plan   Problems Addressed this Visit        Nervous and Auditory    Acute otitis media    Relevant Medications    ibuprofen (ADVIL,MOTRIN) 800 MG tablet    amoxicillin (AMOXIL) 500 MG capsule      Other Visit Diagnoses     Flu-like symptoms    -  Primary    Relevant Orders    POCT Influenza A/B (Completed)        Results for orders placed or performed in visit on 02/06/18   POCT Influenza A/B   Result Value Ref Range    Rapid Influenza A Ag negative     Rapid Influenza B Ag negative     Internal Control Passed Passed    Lot Number [id number]     Expiration Date 12/06/2020      Ear pain R, will cover for infection. Follow up if not settling 1 week, continue ibuprofen for body aches.     Will stop metformin, recheck a1c 3 months. Recheck CMP today.

## 2018-02-07 ENCOUNTER — TELEPHONE (OUTPATIENT)
Dept: FAMILY MEDICINE CLINIC | Facility: CLINIC | Age: 73
End: 2018-02-07

## 2018-02-07 DIAGNOSIS — E87.6 LOW SERUM POTASSIUM: ICD-10-CM

## 2018-02-07 DIAGNOSIS — R79.89 LOW SERUM SODIUM: Primary | ICD-10-CM

## 2018-02-07 NOTE — PROGRESS NOTES
Please call the patient regarding her abnormal result. Sodium and potassium levels very low. Is she having any headaches, diarrhea, or vomiting? Recommend increase potassium supplement to 2 x daily for 1 week and recheck 1 week.

## 2018-02-07 NOTE — TELEPHONE ENCOUNTER
Patient states she had headache when temperature was elevated a couple days ago but none sense. Her main complaint is extreme weakness. Last night so weak thought she was going to fall trying to walk to bed.

## 2018-02-07 NOTE — TELEPHONE ENCOUNTER
----- Message from ALONDRA Mims sent at 2/7/2018  8:40 AM EST -----  Please call the patient regarding her abnormal result. Sodium and potassium levels very low. Is she having any headaches, diarrhea, or vomiting? Recommend increase potassium supplement to 2 x daily for 1 week and recheck 1 week.

## 2018-02-14 DIAGNOSIS — I10 BENIGN ESSENTIAL HYPERTENSION: ICD-10-CM

## 2018-02-14 RX ORDER — POTASSIUM CHLORIDE 20MEQ/15ML
LIQUID (ML) ORAL
Qty: 240 ML | Refills: 1 | Status: SHIPPED | OUTPATIENT
Start: 2018-02-14 | End: 2018-03-21

## 2018-02-16 LAB
ALBUMIN SERPL-MCNC: 3.5 G/DL (ref 3.5–5.2)
ALBUMIN/GLOB SERPL: 0.7 G/DL
ALP SERPL-CCNC: 104 U/L (ref 39–117)
ALT SERPL-CCNC: 36 U/L (ref 1–33)
AST SERPL-CCNC: 46 U/L (ref 1–32)
BILIRUB SERPL-MCNC: 0.9 MG/DL (ref 0.1–1.2)
BUN SERPL-MCNC: 8 MG/DL (ref 8–23)
BUN/CREAT SERPL: 10.7 (ref 7–25)
CALCIUM SERPL-MCNC: 9.1 MG/DL (ref 8.6–10.5)
CHLORIDE SERPL-SCNC: 97 MMOL/L (ref 98–107)
CO2 SERPL-SCNC: 23.4 MMOL/L (ref 22–29)
CREAT SERPL-MCNC: 0.75 MG/DL (ref 0.57–1)
GFR SERPLBLD CREATININE-BSD FMLA CKD-EPI: 76 ML/MIN/1.73
GFR SERPLBLD CREATININE-BSD FMLA CKD-EPI: 93 ML/MIN/1.73
GLOBULIN SER CALC-MCNC: 5.3 GM/DL
GLUCOSE SERPL-MCNC: 139 MG/DL (ref 65–99)
POTASSIUM SERPL-SCNC: 3.9 MMOL/L (ref 3.5–5.2)
PROT SERPL-MCNC: 8.8 G/DL (ref 6–8.5)
SODIUM SERPL-SCNC: 134 MMOL/L (ref 136–145)

## 2018-02-19 ENCOUNTER — TELEPHONE (OUTPATIENT)
Dept: FAMILY MEDICINE CLINIC | Facility: CLINIC | Age: 73
End: 2018-02-19

## 2018-02-19 RX ORDER — LOSARTAN POTASSIUM 100 MG/1
100 TABLET ORAL DAILY
Qty: 30 TABLET | Refills: 0 | Status: SHIPPED | OUTPATIENT
Start: 2018-02-19 | End: 2018-03-19 | Stop reason: SDUPTHER

## 2018-02-19 NOTE — TELEPHONE ENCOUNTER
Pt wants to know if she needs to restart hyzaar now or continue to hold? BP this morning was 149/78  HR 50

## 2018-03-19 DIAGNOSIS — E78.5 DYSLIPIDEMIA: ICD-10-CM

## 2018-03-19 RX ORDER — ATORVASTATIN CALCIUM 40 MG/1
40 TABLET, FILM COATED ORAL NIGHTLY
Qty: 90 TABLET | Refills: 1 | Status: SHIPPED | OUTPATIENT
Start: 2018-03-19 | End: 2018-05-21 | Stop reason: SDUPTHER

## 2018-03-19 RX ORDER — LOSARTAN POTASSIUM 100 MG/1
TABLET ORAL
Qty: 30 TABLET | Refills: 0 | Status: SHIPPED | OUTPATIENT
Start: 2018-03-19 | End: 2018-07-12 | Stop reason: SDUPTHER

## 2018-03-19 NOTE — TELEPHONE ENCOUNTER
Pt was to report for BP check 2 weeks after starting med. Home reading 147/78 hr 50 last week per pt report

## 2018-03-20 ENCOUNTER — TELEPHONE (OUTPATIENT)
Dept: FAMILY MEDICINE CLINIC | Facility: CLINIC | Age: 73
End: 2018-03-20

## 2018-03-20 NOTE — TELEPHONE ENCOUNTER
Pt says the liquid potassium is costing her $100/month and she can no longer afford this. She would like to try potassium tablets for 2 weeks-1 month and then return for blood test and see if this will work for her. Please advise.

## 2018-03-21 RX ORDER — POTASSIUM CHLORIDE 1.5 G/1.77G
20 POWDER, FOR SOLUTION ORAL DAILY
Qty: 30 PACKET | Refills: 0 | Status: SHIPPED | OUTPATIENT
Start: 2018-03-21 | End: 2018-06-14

## 2018-04-03 RX ORDER — FELODIPINE 5 MG/1
5 TABLET, EXTENDED RELEASE ORAL DAILY
Qty: 90 TABLET | Refills: 0 | Status: SHIPPED | OUTPATIENT
Start: 2018-04-03 | End: 2018-04-30 | Stop reason: SDUPTHER

## 2018-04-13 ENCOUNTER — TELEPHONE (OUTPATIENT)
Dept: FAMILY MEDICINE CLINIC | Facility: CLINIC | Age: 73
End: 2018-04-13

## 2018-04-13 DIAGNOSIS — Z86.39 HISTORY OF LOW POTASSIUM: Primary | ICD-10-CM

## 2018-04-13 NOTE — TELEPHONE ENCOUNTER
Patient thinks her potassium has lowered since switching from the liquid form to tablet. She had episode of shakiness and dizziness today and historically these are the symptoms that present when potassium is low. She called pharmacy to request refill of the liquid form to switch back but wants to know if she can get a blood test to have this checked. BP today was 142/92 hr 83 and 1 hour later when dizziness started to subside it was 147/83 hr 76.

## 2018-04-20 RX ORDER — METOPROLOL SUCCINATE 25 MG/1
TABLET, EXTENDED RELEASE ORAL
Qty: 30 TABLET | Refills: 5 | Status: SHIPPED | OUTPATIENT
Start: 2018-04-20 | End: 2018-04-26 | Stop reason: SDUPTHER

## 2018-04-26 LAB
BUN SERPL-MCNC: 8 MG/DL (ref 8–23)
BUN/CREAT SERPL: 11.9 (ref 7–25)
CALCIUM SERPL-MCNC: 9.4 MG/DL (ref 8.6–10.5)
CHLORIDE SERPL-SCNC: 102 MMOL/L (ref 98–107)
CO2 SERPL-SCNC: 26.7 MMOL/L (ref 22–29)
CREAT SERPL-MCNC: 0.67 MG/DL (ref 0.57–1)
GFR SERPLBLD CREATININE-BSD FMLA CKD-EPI: 105 ML/MIN/1.73
GFR SERPLBLD CREATININE-BSD FMLA CKD-EPI: 87 ML/MIN/1.73
GLUCOSE SERPL-MCNC: 116 MG/DL (ref 65–99)
POTASSIUM SERPL-SCNC: 3.9 MMOL/L (ref 3.5–5.2)
SODIUM SERPL-SCNC: 143 MMOL/L (ref 136–145)

## 2018-04-26 RX ORDER — METOPROLOL SUCCINATE 25 MG/1
TABLET, EXTENDED RELEASE ORAL
Qty: 30 TABLET | Refills: 5 | Status: SHIPPED | OUTPATIENT
Start: 2018-04-26 | End: 2018-06-14 | Stop reason: SDUPTHER

## 2018-04-30 RX ORDER — FELODIPINE 5 MG/1
5 TABLET, EXTENDED RELEASE ORAL DAILY
Qty: 90 TABLET | Refills: 0 | Status: SHIPPED | OUTPATIENT
Start: 2018-04-30 | End: 2019-01-31 | Stop reason: SDUPTHER

## 2018-05-16 DIAGNOSIS — I10 BENIGN ESSENTIAL HYPERTENSION: ICD-10-CM

## 2018-05-16 RX ORDER — POTASSIUM CHLORIDE 20MEQ/15ML
LIQUID (ML) ORAL
Qty: 240 ML | Refills: 1 | Status: SHIPPED | OUTPATIENT
Start: 2018-05-16 | End: 2018-08-31 | Stop reason: SDUPTHER

## 2018-05-17 DIAGNOSIS — I10 BENIGN ESSENTIAL HYPERTENSION: ICD-10-CM

## 2018-05-18 RX ORDER — POTASSIUM CHLORIDE 20MEQ/15ML
LIQUID (ML) ORAL
Qty: 240 ML | Refills: 1 | Status: SHIPPED | OUTPATIENT
Start: 2018-05-18 | End: 2018-06-14 | Stop reason: SDUPTHER

## 2018-05-21 DIAGNOSIS — E78.5 DYSLIPIDEMIA: ICD-10-CM

## 2018-05-21 RX ORDER — ATORVASTATIN CALCIUM 40 MG/1
40 TABLET, FILM COATED ORAL NIGHTLY
Qty: 90 TABLET | Refills: 1 | Status: SHIPPED | OUTPATIENT
Start: 2018-05-21 | End: 2018-07-24 | Stop reason: SDUPTHER

## 2018-05-29 DIAGNOSIS — Z91.09 ENVIRONMENTAL ALLERGIES: ICD-10-CM

## 2018-05-29 RX ORDER — MONTELUKAST SODIUM 10 MG/1
10 TABLET ORAL NIGHTLY
Qty: 90 TABLET | Refills: 3 | Status: SHIPPED | OUTPATIENT
Start: 2018-05-29 | End: 2018-06-05 | Stop reason: SDUPTHER

## 2018-06-05 ENCOUNTER — TELEPHONE (OUTPATIENT)
Dept: FAMILY MEDICINE CLINIC | Facility: CLINIC | Age: 73
End: 2018-06-05

## 2018-06-05 DIAGNOSIS — I10 BENIGN ESSENTIAL HYPERTENSION: ICD-10-CM

## 2018-06-05 DIAGNOSIS — R80.9 TYPE 2 DIABETES MELLITUS WITH MICROALBUMINURIA, WITHOUT LONG-TERM CURRENT USE OF INSULIN (HCC): Primary | ICD-10-CM

## 2018-06-05 DIAGNOSIS — E11.29 TYPE 2 DIABETES MELLITUS WITH MICROALBUMINURIA, WITHOUT LONG-TERM CURRENT USE OF INSULIN (HCC): Primary | ICD-10-CM

## 2018-06-05 DIAGNOSIS — Z91.09 ENVIRONMENTAL ALLERGIES: ICD-10-CM

## 2018-06-05 RX ORDER — MONTELUKAST SODIUM 10 MG/1
10 TABLET ORAL NIGHTLY
Qty: 90 TABLET | Refills: 3 | Status: SHIPPED | OUTPATIENT
Start: 2018-06-05 | End: 2019-09-01 | Stop reason: SDUPTHER

## 2018-06-13 LAB
ALBUMIN SERPL-MCNC: 3.9 G/DL (ref 3.5–4.8)
ALBUMIN/CREAT UR: 91.1 MG/G CREAT (ref 0–30)
ALBUMIN/GLOB SERPL: 0.7 {RATIO} (ref 1.2–2.2)
ALP SERPL-CCNC: 135 IU/L (ref 39–117)
ALT SERPL-CCNC: 66 IU/L (ref 0–32)
AST SERPL-CCNC: 77 IU/L (ref 0–40)
BILIRUB SERPL-MCNC: 0.6 MG/DL (ref 0–1.2)
BUN SERPL-MCNC: 11 MG/DL (ref 8–27)
BUN/CREAT SERPL: 14 (ref 12–28)
CALCIUM SERPL-MCNC: 9 MG/DL (ref 8.7–10.3)
CHLORIDE SERPL-SCNC: 100 MMOL/L (ref 96–106)
CO2 SERPL-SCNC: 21 MMOL/L (ref 20–29)
CREAT SERPL-MCNC: 0.76 MG/DL (ref 0.57–1)
CREAT UR-MCNC: 40.5 MG/DL
GFR SERPLBLD CREATININE-BSD FMLA CKD-EPI: 80 ML/MIN/1.73
GFR SERPLBLD CREATININE-BSD FMLA CKD-EPI: 92 ML/MIN/1.73
GLOBULIN SER CALC-MCNC: 5.6 G/DL (ref 1.5–4.5)
GLUCOSE SERPL-MCNC: 119 MG/DL (ref 65–99)
HBA1C MFR BLD: 6.4 % (ref 4.8–5.6)
MICROALBUMIN UR-MCNC: 36.9 UG/ML
POTASSIUM SERPL-SCNC: 3.9 MMOL/L (ref 3.5–5.2)
PROT SERPL-MCNC: 9.5 G/DL (ref 6–8.5)
SODIUM SERPL-SCNC: 142 MMOL/L (ref 134–144)

## 2018-06-14 ENCOUNTER — OFFICE VISIT (OUTPATIENT)
Dept: FAMILY MEDICINE CLINIC | Facility: CLINIC | Age: 73
End: 2018-06-14

## 2018-06-14 VITALS
HEIGHT: 65 IN | WEIGHT: 154 LBS | SYSTOLIC BLOOD PRESSURE: 174 MMHG | TEMPERATURE: 98.1 F | BODY MASS INDEX: 25.66 KG/M2 | DIASTOLIC BLOOD PRESSURE: 78 MMHG | HEART RATE: 71 BPM | OXYGEN SATURATION: 98 %

## 2018-06-14 DIAGNOSIS — R77.9 HIGH SERUM PROTEIN LEVEL: Primary | ICD-10-CM

## 2018-06-14 DIAGNOSIS — L20.82 FLEXURAL ECZEMA: ICD-10-CM

## 2018-06-14 DIAGNOSIS — E11.29 TYPE 2 DIABETES MELLITUS WITH MICROALBUMINURIA, WITHOUT LONG-TERM CURRENT USE OF INSULIN (HCC): ICD-10-CM

## 2018-06-14 DIAGNOSIS — R79.89 ELEVATED LFTS: ICD-10-CM

## 2018-06-14 DIAGNOSIS — R10.11 RIGHT UPPER QUADRANT PAIN: ICD-10-CM

## 2018-06-14 DIAGNOSIS — I10 BENIGN ESSENTIAL HYPERTENSION: ICD-10-CM

## 2018-06-14 DIAGNOSIS — R80.9 TYPE 2 DIABETES MELLITUS WITH MICROALBUMINURIA, WITHOUT LONG-TERM CURRENT USE OF INSULIN (HCC): ICD-10-CM

## 2018-06-14 PROCEDURE — 99214 OFFICE O/P EST MOD 30 MIN: CPT | Performed by: NURSE PRACTITIONER

## 2018-06-14 RX ORDER — METOPROLOL SUCCINATE 50 MG/1
50 TABLET, EXTENDED RELEASE ORAL DAILY
Qty: 30 TABLET | Refills: 1 | Status: SHIPPED | OUTPATIENT
Start: 2018-06-14 | End: 2018-09-13 | Stop reason: SDUPTHER

## 2018-06-14 RX ORDER — TRIAMCINOLONE ACETONIDE 1 MG/G
CREAM TOPICAL 2 TIMES DAILY
Qty: 28.4 G | Refills: 0 | Status: SHIPPED | OUTPATIENT
Start: 2018-06-14 | End: 2018-09-13 | Stop reason: SDUPTHER

## 2018-06-14 NOTE — PROGRESS NOTES
"Subjective   Radha Acosta is a 72 y.o. female who presents for a follow up on hypertension, diabetes.     History of Present Illness   Had AWV with humana in the home. Overall feeling well except allergies. Palpitations are bad when allergies are flared. Blood pressure has been running high.     Has been applying baby oil to dry patchy area to R leg, knee area. Told to apply hydrocortisone. Started with red veins, mild rash,then dry patches. Taking more aleve for the pain. Not eating a high protein diet. Eats 2 eggs daily, fish, no meat. Lots of vegetables. 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 No alcohol use.     Still working out regularly.   The following portions of the patient's history were reviewed and updated as appropriate: allergies, current medications, past family history, past medical history, past social history, past surgical history and problem list.    Review of Systems   Constitutional: Negative for activity change, appetite change, fatigue, unexpected weight gain and unexpected weight loss.   Eyes: Positive for redness. Negative for visual disturbance.   Respiratory: Negative.  Negative for shortness of breath.    Cardiovascular: Positive for palpitations. Negative for chest pain and leg swelling.   Gastrointestinal: Negative.  Negative for abdominal distention, abdominal pain, nausea and indigestion.   Musculoskeletal: Positive for arthralgias.   Skin: Positive for dry skin and skin lesions.   Allergic/Immunologic: Positive for environmental allergies.   Hematological: Negative.    Psychiatric/Behavioral: Negative.      /78   Pulse 71   Temp 98.1 °F (36.7 °C) (Oral)   Ht 165.1 cm (65\")   Wt 69.9 kg (154 lb)   SpO2 98%   BMI 25.63 kg/m²     Objective   Physical Exam   Constitutional: She appears well-developed and well-nourished.   Neck: Normal range of motion. Neck supple. No thyromegaly present.   Cardiovascular: Normal rate, regular rhythm and normal heart sounds.  "   Pulmonary/Chest: Effort normal and breath sounds normal.   Abdominal: Soft. Bowel sounds are normal. There is no hepatomegaly. There is tenderness in the right upper quadrant. There is no rigidity, no rebound, no guarding, no tenderness at McBurney's point and negative Coreas's sign.   Lymphadenopathy:     She has no cervical adenopathy.   Skin: Skin is warm and dry.   Psychiatric: She has a normal mood and affect. Her behavior is normal. Judgment and thought content normal.   Nursing note and vitals reviewed.    Assessment/Plan   Radha was seen today for follow-up.    Diagnoses and all orders for this visit:    High serum protein level  -     CBC & Differential  -     Peripheral Blood Smear  -     Vitamin B12  -     Folate  -     Protein Elec + Interp, Serum    Elevated LFTs  -     Hepatitis Panel, Acute  -     PT & PTT ([facility])  -     Iron Profile    Type 2 diabetes mellitus with microalbuminuria, without long-term current use of insulin  -     TSH    Benign essential hypertension    Right upper quadrant pain   -     Hepatitis Panel, Acute    Other orders  -     metoprolol succinate XL (TOPROL-XL) 50 MG 24 hr tablet; Take 1 tablet by mouth Daily.    HTN--uncontrolled, increase metoprolol to 50mg daily.   High serum protein levels and LFTs--need additional labs. Consider liver US.   DM2--stable A1c 6.4, continue off metformin.   Continue plavix as TIA.  Follow up pending lab results.

## 2018-06-15 LAB
ALBUMIN SERPL ELPH-MCNC: 3.4 G/DL (ref 2.9–4.4)
ALBUMIN/GLOB SERPL: 0.6 {RATIO} (ref 0.7–1.7)
ALPHA1 GLOB SERPL ELPH-MCNC: 0.3 G/DL (ref 0–0.4)
ALPHA2 GLOB SERPL ELPH-MCNC: 0.7 G/DL (ref 0.4–1)
APTT PPP: 29 SEC (ref 24–33)
B-GLOBULIN SERPL ELPH-MCNC: 1.2 G/DL (ref 0.7–1.3)
BASOPHILS # BLD AUTO: 0.1 X10E3/UL (ref 0–0.2)
BASOPHILS NFR BLD AUTO: 1 %
EOSINOPHIL # BLD AUTO: 0.6 X10E3/UL (ref 0–0.4)
EOSINOPHIL NFR BLD AUTO: 7 %
ERYTHROCYTE [DISTWIDTH] IN BLOOD BY AUTOMATED COUNT: 15 % (ref 12.3–15.4)
FOLATE SERPL-MCNC: 19.8 NG/ML
GAMMA GLOB SERPL ELPH-MCNC: 3.9 G/DL (ref 0.4–1.8)
GLOBULIN SER CALC-MCNC: 6.1 G/DL (ref 2.2–3.9)
HAV IGM SERPL QL IA: NEGATIVE
HBV CORE IGM SERPL QL IA: NEGATIVE
HBV SURFACE AG SERPL QL IA: NEGATIVE
HCT VFR BLD AUTO: 44.4 % (ref 34–46.6)
HCV AB S/CO SERPL IA: 0.2 S/CO RATIO (ref 0–0.9)
HGB BLD-MCNC: 14.2 G/DL (ref 11.1–15.9)
IMM GRANULOCYTES # BLD: 0 X10E3/UL (ref 0–0.1)
IMM GRANULOCYTES NFR BLD: 0 %
INR PPP: 1.2 (ref 0.8–1.2)
IRON SATN MFR SERPL: 17 % (ref 15–55)
IRON SERPL-MCNC: 62 UG/DL (ref 27–139)
LABORATORY COMMENT REPORT: ABNORMAL
LYMPHOCYTES # BLD AUTO: 2.4 X10E3/UL (ref 0.7–3.1)
LYMPHOCYTES NFR BLD AUTO: 29 %
M PROTEIN SERPL ELPH-MCNC: ABNORMAL G/DL
MCH RBC QN AUTO: 30.3 PG (ref 26.6–33)
MCHC RBC AUTO-ENTMCNC: 32 G/DL (ref 31.5–35.7)
MCV RBC AUTO: 95 FL (ref 79–97)
MONOCYTES # BLD AUTO: 0.8 X10E3/UL (ref 0.1–0.9)
MONOCYTES NFR BLD AUTO: 9 %
NEUTROPHILS # BLD AUTO: 4.6 X10E3/UL (ref 1.4–7)
NEUTROPHILS NFR BLD AUTO: 54 %
PLATELET # BLD AUTO: 200 X10E3/UL (ref 150–379)
PROT PATTERN SERPL ELPH-IMP: ABNORMAL
PROT SERPL-MCNC: 9.5 G/DL (ref 6–8.5)
PROTHROMBIN TIME: 11.8 SEC (ref 9.1–12)
RBC # BLD AUTO: 4.68 X10E6/UL (ref 3.77–5.28)
TIBC SERPL-MCNC: 369 UG/DL (ref 250–450)
TSH SERPL DL<=0.005 MIU/L-ACNC: 0.97 UIU/ML (ref 0.45–4.5)
UIBC SERPL-MCNC: 307 UG/DL (ref 118–369)
VIT B12 SERPL-MCNC: 567 PG/ML (ref 232–1245)
WBC # BLD AUTO: 8.4 X10E3/UL (ref 3.4–10.8)

## 2018-06-19 DIAGNOSIS — R74.8 ELEVATED LIVER ENZYMES: Primary | ICD-10-CM

## 2018-06-19 LAB
BASOPHILS # BLD AUTO: 0 X10E3/UL (ref 0–0.2)
BASOPHILS NFR BLD AUTO: 1 %
EOSINOPHIL # BLD AUTO: 0.5 X10E3/UL (ref 0–0.4)
EOSINOPHIL NFR BLD AUTO: 7 %
ERYTHROCYTE [DISTWIDTH] IN BLOOD BY AUTOMATED COUNT: 14.9 % (ref 12.3–15.4)
HCT VFR BLD AUTO: 42.9 % (ref 34–46.6)
HGB BLD-MCNC: 13.8 G/DL (ref 11.1–15.9)
IMM GRANULOCYTES # BLD: 0 X10E3/UL (ref 0–0.1)
IMM GRANULOCYTES NFR BLD: 0 %
LYMPHOCYTES # BLD AUTO: 2.2 X10E3/UL (ref 0.7–3.1)
LYMPHOCYTES NFR BLD AUTO: 27 %
Lab: NORMAL
MCH RBC QN AUTO: 29.9 PG (ref 26.6–33)
MCHC RBC AUTO-ENTMCNC: 32.2 G/DL (ref 31.5–35.7)
MCV RBC AUTO: 93 FL (ref 79–97)
MONOCYTES # BLD AUTO: 0.8 X10E3/UL (ref 0.1–0.9)
MONOCYTES NFR BLD AUTO: 10 %
NEUTROPHILS # BLD AUTO: 4.4 X10E3/UL (ref 1.4–7)
NEUTROPHILS NFR BLD AUTO: 55 %
PATH REV BLD -IMP: ABNORMAL
PATHOLOGIST NAME: ABNORMAL
PLATELET # BLD AUTO: 225 X10E3/UL (ref 150–379)
RBC # BLD AUTO: 4.61 X10E6/UL (ref 3.77–5.28)
SPECIMEN STATUS: NORMAL
WBC # BLD AUTO: 8 X10E3/UL (ref 3.4–10.8)
WRITTEN AUTHORIZATION: NORMAL

## 2018-06-19 NOTE — PROGRESS NOTES
Please call the patient regarding her abnormal result. Labs with inflammatory signs and elevated liver enzymes. Recommend liver US and GI referral. Hepatitis screening negative

## 2018-07-02 RX ORDER — CLOPIDOGREL BISULFATE 75 MG/1
TABLET ORAL
Qty: 30 TABLET | Refills: 5 | Status: SHIPPED | OUTPATIENT
Start: 2018-07-02 | End: 2018-12-20 | Stop reason: SDUPTHER

## 2018-07-03 ENCOUNTER — HOSPITAL ENCOUNTER (OUTPATIENT)
Dept: ULTRASOUND IMAGING | Facility: HOSPITAL | Age: 73
Discharge: HOME | End: 2018-07-03
Admitting: NURSE PRACTITIONER

## 2018-07-03 PROCEDURE — 76705 ECHO EXAM OF ABDOMEN: CPT

## 2018-07-12 RX ORDER — LOSARTAN POTASSIUM 100 MG/1
TABLET ORAL
Qty: 90 TABLET | Refills: 0 | Status: SHIPPED | OUTPATIENT
Start: 2018-07-12 | End: 2018-10-14 | Stop reason: SDUPTHER

## 2018-07-24 DIAGNOSIS — E78.5 DYSLIPIDEMIA: ICD-10-CM

## 2018-07-24 RX ORDER — ATORVASTATIN CALCIUM 40 MG/1
40 TABLET, FILM COATED ORAL NIGHTLY
Qty: 90 TABLET | Refills: 1 | Status: SHIPPED | OUTPATIENT
Start: 2018-07-24 | End: 2020-01-03

## 2018-08-31 DIAGNOSIS — I10 BENIGN ESSENTIAL HYPERTENSION: ICD-10-CM

## 2018-08-31 RX ORDER — POTASSIUM CHLORIDE 20MEQ/15ML
LIQUID (ML) ORAL
Qty: 480 ML | Refills: 0 | Status: SHIPPED | OUTPATIENT
Start: 2018-08-31 | End: 2020-03-16

## 2018-09-13 DIAGNOSIS — L20.82 FLEXURAL ECZEMA: ICD-10-CM

## 2018-09-13 RX ORDER — METOPROLOL SUCCINATE 50 MG/1
TABLET, EXTENDED RELEASE ORAL
Qty: 60 TABLET | Refills: 0 | Status: SHIPPED | OUTPATIENT
Start: 2018-09-13 | End: 2018-11-08 | Stop reason: SDUPTHER

## 2018-09-13 RX ORDER — TRIAMCINOLONE ACETONIDE 1 MG/G
CREAM TOPICAL
Qty: 45 G | Refills: 1 | Status: SHIPPED | OUTPATIENT
Start: 2018-09-13 | End: 2022-04-08

## 2018-10-03 ENCOUNTER — TRANSCRIBE ORDERS (OUTPATIENT)
Dept: FAMILY MEDICINE CLINIC | Facility: CLINIC | Age: 73
End: 2018-10-03

## 2018-10-03 DIAGNOSIS — Z12.31 VISIT FOR SCREENING MAMMOGRAM: Primary | ICD-10-CM

## 2018-10-15 RX ORDER — LOSARTAN POTASSIUM 100 MG/1
TABLET ORAL
Qty: 90 TABLET | Refills: 0 | Status: SHIPPED | OUTPATIENT
Start: 2018-10-15 | End: 2018-12-31 | Stop reason: SDUPTHER

## 2018-10-26 ENCOUNTER — HOSPITAL ENCOUNTER (OUTPATIENT)
Dept: MAMMOGRAPHY | Facility: HOSPITAL | Age: 73
Discharge: HOME | End: 2018-10-26
Admitting: NURSE PRACTITIONER

## 2018-10-26 DIAGNOSIS — Z12.31 VISIT FOR SCREENING MAMMOGRAM: ICD-10-CM

## 2018-10-26 PROCEDURE — 77063 BREAST TOMOSYNTHESIS BI: CPT

## 2018-10-26 PROCEDURE — 77067 SCR MAMMO BI INCL CAD: CPT

## 2018-10-26 RX ORDER — FELODIPINE 5 MG/1
TABLET, EXTENDED RELEASE ORAL
Qty: 90 TABLET | Refills: 0 | Status: SHIPPED | OUTPATIENT
Start: 2018-10-26 | End: 2019-01-25 | Stop reason: SDUPTHER

## 2018-11-08 RX ORDER — METOPROLOL SUCCINATE 50 MG/1
TABLET, EXTENDED RELEASE ORAL
Qty: 60 TABLET | Refills: 3 | Status: SHIPPED | OUTPATIENT
Start: 2018-11-08 | End: 2019-05-07 | Stop reason: SDUPTHER

## 2018-12-20 RX ORDER — CLOPIDOGREL BISULFATE 75 MG/1
TABLET ORAL
Qty: 90 TABLET | Refills: 1 | Status: SHIPPED | OUTPATIENT
Start: 2018-12-20 | End: 2020-03-13

## 2018-12-31 RX ORDER — LOSARTAN POTASSIUM 100 MG/1
TABLET ORAL
Qty: 90 TABLET | Refills: 0 | Status: SHIPPED | OUTPATIENT
Start: 2018-12-31 | End: 2019-04-09 | Stop reason: SDUPTHER

## 2019-01-22 ENCOUNTER — TELEPHONE (OUTPATIENT)
Dept: FAMILY MEDICINE CLINIC | Facility: CLINIC | Age: 74
End: 2019-01-22

## 2019-01-22 DIAGNOSIS — I10 BENIGN ESSENTIAL HYPERTENSION: ICD-10-CM

## 2019-01-22 DIAGNOSIS — E78.1 FAMILIAL HYPERTRIGLYCERIDEMIA: Primary | ICD-10-CM

## 2019-01-22 DIAGNOSIS — E11.9 TYPE 2 DIABETES MELLITUS WITHOUT COMPLICATION, WITHOUT LONG-TERM CURRENT USE OF INSULIN (HCC): ICD-10-CM

## 2019-01-25 RX ORDER — FELODIPINE 5 MG/1
TABLET, EXTENDED RELEASE ORAL
Qty: 90 TABLET | Refills: 0 | Status: SHIPPED | OUTPATIENT
Start: 2019-01-25 | End: 2019-01-31 | Stop reason: SDUPTHER

## 2019-01-26 LAB
ALBUMIN SERPL-MCNC: 3.6 G/DL (ref 3.5–5.2)
ALBUMIN/CREAT UR: 44.3 MG/G CREAT (ref 0–30)
ALBUMIN/GLOB SERPL: 0.6 G/DL
ALP SERPL-CCNC: 95 U/L (ref 39–117)
ALT SERPL-CCNC: 53 U/L (ref 1–33)
AST SERPL-CCNC: 57 U/L (ref 1–32)
BASOPHILS # BLD AUTO: 0.04 10*3/MM3 (ref 0–0.2)
BASOPHILS NFR BLD AUTO: 0.6 % (ref 0–1.5)
BILIRUB SERPL-MCNC: 1.1 MG/DL (ref 0.1–1.2)
BUN SERPL-MCNC: 11 MG/DL (ref 8–23)
BUN/CREAT SERPL: 13.8 (ref 7–25)
CALCIUM SERPL-MCNC: 8.9 MG/DL (ref 8.6–10.5)
CHLORIDE SERPL-SCNC: 98 MMOL/L (ref 98–107)
CHOLEST SERPL-MCNC: 81 MG/DL (ref 0–200)
CO2 SERPL-SCNC: 28.6 MMOL/L (ref 22–29)
CREAT SERPL-MCNC: 0.8 MG/DL (ref 0.57–1)
CREAT UR-MCNC: 217.2 MG/DL
EOSINOPHIL # BLD AUTO: 0.37 10*3/MM3 (ref 0–0.7)
EOSINOPHIL NFR BLD AUTO: 5.2 % (ref 0.3–6.2)
ERYTHROCYTE [DISTWIDTH] IN BLOOD BY AUTOMATED COUNT: 14.9 % (ref 11.7–13)
GLOBULIN SER CALC-MCNC: 5.6 GM/DL
GLUCOSE SERPL-MCNC: 100 MG/DL (ref 65–99)
HBA1C MFR BLD: 7.01 % (ref 4.8–5.6)
HCT VFR BLD AUTO: 43.9 % (ref 35.6–45.5)
HDLC SERPL-MCNC: 31 MG/DL (ref 40–60)
HGB BLD-MCNC: 14.5 G/DL (ref 11.9–15.5)
IMM GRANULOCYTES # BLD AUTO: 0 10*3/MM3 (ref 0–0.03)
IMM GRANULOCYTES NFR BLD AUTO: 0 % (ref 0–0.5)
LDLC SERPL CALC-MCNC: 36 MG/DL (ref 0–100)
LYMPHOCYTES # BLD AUTO: 2.22 10*3/MM3 (ref 0.9–4.8)
LYMPHOCYTES NFR BLD AUTO: 30.9 % (ref 19.6–45.3)
MCH RBC QN AUTO: 31.5 PG (ref 26.9–32)
MCHC RBC AUTO-ENTMCNC: 33 G/DL (ref 32.4–36.3)
MCV RBC AUTO: 95.4 FL (ref 80.5–98.2)
MICROALBUMIN UR-MCNC: 96.3 UG/ML
MONOCYTES # BLD AUTO: 0.65 10*3/MM3 (ref 0.2–1.2)
MONOCYTES NFR BLD AUTO: 9.1 % (ref 5–12)
NEUTROPHILS # BLD AUTO: 3.9 10*3/MM3 (ref 1.9–8.1)
NEUTROPHILS NFR BLD AUTO: 54.2 % (ref 42.7–76)
PLATELET # BLD AUTO: 218 10*3/MM3 (ref 140–500)
POTASSIUM SERPL-SCNC: 3.8 MMOL/L (ref 3.5–5.2)
PROT SERPL-MCNC: 9.2 G/DL (ref 6–8.5)
RBC # BLD AUTO: 4.6 10*6/MM3 (ref 3.9–5.2)
SODIUM SERPL-SCNC: 137 MMOL/L (ref 136–145)
TRIGL SERPL-MCNC: 68 MG/DL (ref 0–150)
VLDLC SERPL CALC-MCNC: 13.6 MG/DL (ref 5–40)
WBC # BLD AUTO: 7.18 10*3/MM3 (ref 4.5–10.7)

## 2019-01-29 NOTE — TELEPHONE ENCOUNTER
Please call the patient regarding her abnormal result. Diabetes is worsening with A1c 7.0, still with protein in the urine. Will discuss needed changes at follow up. Cholesterol is normal, continue statin.

## 2019-01-31 ENCOUNTER — OFFICE VISIT (OUTPATIENT)
Dept: FAMILY MEDICINE CLINIC | Facility: CLINIC | Age: 74
End: 2019-01-31

## 2019-01-31 VITALS
WEIGHT: 160 LBS | HEIGHT: 65 IN | SYSTOLIC BLOOD PRESSURE: 168 MMHG | BODY MASS INDEX: 26.66 KG/M2 | DIASTOLIC BLOOD PRESSURE: 82 MMHG | OXYGEN SATURATION: 99 % | HEART RATE: 78 BPM | TEMPERATURE: 98.7 F

## 2019-01-31 DIAGNOSIS — I10 BENIGN ESSENTIAL HYPERTENSION: Primary | ICD-10-CM

## 2019-01-31 DIAGNOSIS — R80.9 TYPE 2 DIABETES MELLITUS WITH MICROALBUMINURIA, WITHOUT LONG-TERM CURRENT USE OF INSULIN (HCC): ICD-10-CM

## 2019-01-31 DIAGNOSIS — E11.29 TYPE 2 DIABETES MELLITUS WITH MICROALBUMINURIA, WITHOUT LONG-TERM CURRENT USE OF INSULIN (HCC): ICD-10-CM

## 2019-01-31 DIAGNOSIS — L60.3 THIN NAILS: ICD-10-CM

## 2019-01-31 DIAGNOSIS — M19.049 HAND ARTHRITIS: ICD-10-CM

## 2019-01-31 PROCEDURE — 99214 OFFICE O/P EST MOD 30 MIN: CPT | Performed by: NURSE PRACTITIONER

## 2019-01-31 RX ORDER — NAPROXEN 250 MG/1
250 TABLET ORAL 2 TIMES DAILY PRN
Qty: 180 TABLET | Refills: 0 | Status: SHIPPED | OUTPATIENT
Start: 2019-01-31 | End: 2019-03-29

## 2019-01-31 RX ORDER — FELODIPINE 10 MG/1
10 TABLET, EXTENDED RELEASE ORAL DAILY
Qty: 30 TABLET | Refills: 0 | Status: SHIPPED | OUTPATIENT
Start: 2019-01-31 | End: 2019-04-09 | Stop reason: SDUPTHER

## 2019-01-31 NOTE — PROGRESS NOTES
"Subjective   Radha Acosta is a 73 y.o. female who presents for a follow up on diabetes.     Diabetes   She presents for her follow-up diabetic visit. She has type 2 diabetes mellitus. There are no hypoglycemic associated symptoms. There are no diabetic associated symptoms. Pertinent negatives for diabetes include no chest pain, no fatigue and no weight loss. There are no hypoglycemic complications. There are no diabetic complications. Current diabetic treatment includes oral agent (monotherapy). She is compliant with treatment most of the time. Her weight is increasing steadily. She is following a diabetic diet. She rarely participates in exercise. An ACE inhibitor/angiotensin II receptor blocker is being taken. She does not see a podiatrist.Eye exam is not current.      Having pain in the hands. Having swelling in the abdomen. No pain.   The following portions of the patient's history were reviewed and updated as appropriate: allergies, current medications, past family history, past medical history, past social history, past surgical history and problem list.    Review of Systems   Constitutional: Positive for unexpected weight gain. Negative for activity change, appetite change, fatigue and unexpected weight loss.   Respiratory: Negative.  Negative for shortness of breath.    Cardiovascular: Negative.  Negative for chest pain, palpitations and leg swelling.   Gastrointestinal: Positive for abdominal distention. Negative for abdominal pain and constipation.   Musculoskeletal: Positive for arthralgias.   Skin: Negative for dry skin.   Psychiatric/Behavioral: Negative.      /82   Pulse 78   Temp 98.7 °F (37.1 °C) (Oral)   Ht 165.1 cm (65\")   Wt 72.6 kg (160 lb)   SpO2 99%   BMI 26.63 kg/m²     Objective   Physical Exam   Constitutional: She appears well-developed and well-nourished.   Neck: Normal range of motion. Neck supple. No thyromegaly present.   Cardiovascular: Normal rate, regular " rhythm and normal heart sounds.   Pulmonary/Chest: Effort normal and breath sounds normal.   Musculoskeletal:        Right hand: She exhibits bony tenderness. She exhibits no swelling. Normal sensation noted. Normal strength noted.        Left hand: She exhibits bony tenderness (heberens nodules).   Lymphadenopathy:     She has no cervical adenopathy.   Skin: Skin is warm and dry.   Dry skin, thin nails   Psychiatric: She has a normal mood and affect. Her behavior is normal. Judgment and thought content normal.   Nursing note and vitals reviewed.    Assessment/Plan   Problems Addressed this Visit        Cardiovascular and Mediastinum    Benign essential hypertension - Primary    Relevant Medications    felodipine (PLENDIL) 10 MG 24 hr tablet       Endocrine    Type 2 diabetes mellitus with microalbuminuria, without long-term current use of insulin (CMS/[facility])    Relevant Medications    metFORMIN (GLUCOPHAGE) 500 MG tablet      Other Visit Diagnoses     Thin nails        Hand arthritis              DM2--A1c 7.0, would continue metformin, work on 5-10 lb weight loss to address. Recently restarted metformin. Has changed diet back and plans to restart at gym.   HTN--increase felodipine. Continue losartan, metoprolol.  Thin nails--increase calcium in diet  Hand arthritis--ok for rare naproxen  FU 6 months.

## 2019-03-29 ENCOUNTER — OFFICE VISIT (OUTPATIENT)
Dept: FAMILY MEDICINE CLINIC | Facility: CLINIC | Age: 74
End: 2019-03-29

## 2019-03-29 VITALS
HEART RATE: 66 BPM | OXYGEN SATURATION: 94 % | BODY MASS INDEX: 26.33 KG/M2 | WEIGHT: 158 LBS | SYSTOLIC BLOOD PRESSURE: 132 MMHG | DIASTOLIC BLOOD PRESSURE: 78 MMHG | HEIGHT: 65 IN | TEMPERATURE: 98.7 F

## 2019-03-29 DIAGNOSIS — J01.00 ACUTE NON-RECURRENT MAXILLARY SINUSITIS: ICD-10-CM

## 2019-03-29 DIAGNOSIS — R80.9 TYPE 2 DIABETES MELLITUS WITH MICROALBUMINURIA, WITHOUT LONG-TERM CURRENT USE OF INSULIN (HCC): Primary | ICD-10-CM

## 2019-03-29 DIAGNOSIS — E11.29 TYPE 2 DIABETES MELLITUS WITH MICROALBUMINURIA, WITHOUT LONG-TERM CURRENT USE OF INSULIN (HCC): Primary | ICD-10-CM

## 2019-03-29 DIAGNOSIS — J30.2 SEASONAL ALLERGIC RHINITIS, UNSPECIFIED TRIGGER: ICD-10-CM

## 2019-03-29 DIAGNOSIS — M15.2 BOUCHARD NODES (DJD HAND): ICD-10-CM

## 2019-03-29 PROCEDURE — 99213 OFFICE O/P EST LOW 20 MIN: CPT | Performed by: NURSE PRACTITIONER

## 2019-03-29 RX ORDER — AMOXICILLIN AND CLAVULANATE POTASSIUM 875; 125 MG/1; MG/1
1 TABLET, FILM COATED ORAL 2 TIMES DAILY
Qty: 14 TABLET | Refills: 0 | Status: SHIPPED | OUTPATIENT
Start: 2019-03-29 | End: 2019-09-20

## 2019-03-29 RX ORDER — MELOXICAM 7.5 MG/1
7.5 TABLET ORAL DAILY
Qty: 30 TABLET | Refills: 0 | Status: SHIPPED | OUTPATIENT
Start: 2019-03-29 | End: 2019-04-23 | Stop reason: SDUPTHER

## 2019-03-29 NOTE — PROGRESS NOTES
"Subjective   Radha Acosta is a 73 y.o. female who presents c/o flare up in allergies and increase in arthritic pain.     History of Present Illness   Allergies flares for a while, grandchildren contaminating her. Cough and nasal bleeding with yellow nasal mucus.  Arthritis worse in hands and vein jumping in the L elbow. Buying aspercreme as diclofenac topical is expensive. Applying bandaids, so fingers won't bend in her sleep.   Had an episode of neck pain lasting 2 weeks where couldn't rotate neck, resolved on it's own.   The following portions of the patient's history were reviewed and updated as appropriate: allergies, current medications, past family history, past medical history, past social history, past surgical history and problem list.    Review of Systems   Constitutional: Negative for chills, fatigue and fever.   HENT: Positive for congestion, nosebleeds, rhinorrhea, sinus pressure and sore throat. Negative for ear pain.    Respiratory: Positive for cough.    Cardiovascular: Positive for palpitations. Negative for leg swelling.   Gastrointestinal: Negative.    Musculoskeletal: Positive for arthralgias and neck pain.   Skin: Negative.    Allergic/Immunologic: Positive for environmental allergies.   Hematological: Negative.    Psychiatric/Behavioral: Negative.      /78   Pulse 66   Temp 98.7 °F (37.1 °C) (Oral)   Ht 165.1 cm (65\")   Wt 71.7 kg (158 lb)   SpO2 94%   BMI 26.29 kg/m²     Objective   Physical Exam   Constitutional: She appears well-developed and well-nourished. No distress.   HENT:   Right Ear: Tympanic membrane normal.   Left Ear: A middle ear effusion is present.   Nose: Mucosal edema present. Right sinus exhibits maxillary sinus tenderness. Left sinus exhibits maxillary sinus tenderness.   Mouth/Throat: Uvula is midline and mucous membranes are normal. Posterior oropharyngeal erythema present.   Neck: Normal range of motion. Neck supple. No tracheal deviation present. " No thyromegaly present.   Cardiovascular: Normal rate, regular rhythm and normal heart sounds.   Brachial pulses strong and equal bilateral   Pulmonary/Chest: Effort normal and breath sounds normal.   Musculoskeletal:        Left hand: She exhibits decreased range of motion and bony tenderness. She exhibits no tenderness, normal two-point discrimination, normal capillary refill, no deformity, no laceration and no swelling.   Lymphadenopathy:     She has no cervical adenopathy.   Nursing note and vitals reviewed.    Assessment/Plan   Problems Addressed this Visit        Respiratory    Atopic rhinitis       Endocrine    Type 2 diabetes mellitus with microalbuminuria, without long-term current use of insulin (CMS/[facility]) - Primary    Relevant Medications    metFORMIN (GLUCOPHAGE) 500 MG tablet       Musculoskeletal and Integument    Saul nodes (DJD hand)    Relevant Medications    meloxicam (MOBIC) 7.5 MG tablet      Other Visit Diagnoses     Acute non-recurrent maxillary sinusitis            Atopic rhinitis--continue symptom controllers.   T2DM--restart metformin--A1c in 3 months  DJD hands--start mobic take in am, plavix in pm do not take together discussed GI risk  Sinusitis--start antibiotics, follow up if not settling, 7-10 days.

## 2019-04-09 RX ORDER — FELODIPINE 10 MG/1
TABLET, EXTENDED RELEASE ORAL
Qty: 30 TABLET | Refills: 0 | Status: SHIPPED | OUTPATIENT
Start: 2019-04-09 | End: 2020-01-17

## 2019-04-09 RX ORDER — LOSARTAN POTASSIUM 100 MG/1
TABLET ORAL
Qty: 90 TABLET | Refills: 0 | Status: SHIPPED | OUTPATIENT
Start: 2019-04-09 | End: 2019-06-24 | Stop reason: SDUPTHER

## 2019-04-23 DIAGNOSIS — I10 BENIGN ESSENTIAL HYPERTENSION: ICD-10-CM

## 2019-04-23 DIAGNOSIS — M15.2 BOUCHARD NODES (DJD HAND): ICD-10-CM

## 2019-04-23 RX ORDER — POTASSIUM CHLORIDE 20MEQ/15ML
LIQUID (ML) ORAL
Qty: 240 ML | Refills: 1 | Status: SHIPPED | OUTPATIENT
Start: 2019-04-23 | End: 2019-08-16 | Stop reason: SDUPTHER

## 2019-04-23 RX ORDER — MELOXICAM 7.5 MG/1
TABLET ORAL
Qty: 30 TABLET | Refills: 0 | Status: SHIPPED | OUTPATIENT
Start: 2019-04-23 | End: 2019-05-26 | Stop reason: SDUPTHER

## 2019-05-07 RX ORDER — METOPROLOL SUCCINATE 50 MG/1
50 TABLET, EXTENDED RELEASE ORAL DAILY
Qty: 90 TABLET | Refills: 0 | Status: SHIPPED | OUTPATIENT
Start: 2019-05-07 | End: 2019-12-10 | Stop reason: SDUPTHER

## 2019-05-07 NOTE — TELEPHONE ENCOUNTER
Is patient supposed to be taking metoprolol once or twice daily? Pharmacy sent a fax request for 1 po qd #[phone].

## 2019-05-13 RX ORDER — FELODIPINE 10 MG/1
TABLET, EXTENDED RELEASE ORAL
Qty: 90 TABLET | Refills: 0 | Status: SHIPPED | OUTPATIENT
Start: 2019-05-13 | End: 2019-07-29 | Stop reason: SDUPTHER

## 2019-05-26 DIAGNOSIS — M15.2 BOUCHARD NODES (DJD HAND): ICD-10-CM

## 2019-05-28 RX ORDER — MELOXICAM 7.5 MG/1
TABLET ORAL
Qty: 30 TABLET | Refills: 0 | Status: SHIPPED | OUTPATIENT
Start: 2019-05-28 | End: 2019-06-24 | Stop reason: SDUPTHER

## 2019-06-24 DIAGNOSIS — E78.5 DYSLIPIDEMIA: ICD-10-CM

## 2019-06-24 DIAGNOSIS — M15.2 BOUCHARD NODES (DJD HAND): ICD-10-CM

## 2019-06-24 RX ORDER — ATORVASTATIN CALCIUM 40 MG/1
TABLET, FILM COATED ORAL
Qty: 90 TABLET | Refills: 0 | Status: SHIPPED | OUTPATIENT
Start: 2019-06-24 | End: 2019-09-20 | Stop reason: SDUPTHER

## 2019-06-24 RX ORDER — MELOXICAM 7.5 MG/1
TABLET ORAL
Qty: 30 TABLET | Refills: 0 | Status: SHIPPED | OUTPATIENT
Start: 2019-06-24 | End: 2019-07-21 | Stop reason: SDUPTHER

## 2019-06-24 RX ORDER — CLOPIDOGREL BISULFATE 75 MG/1
TABLET ORAL
Qty: 90 TABLET | Refills: 0 | Status: SHIPPED | OUTPATIENT
Start: 2019-06-24 | End: 2019-09-20 | Stop reason: SDUPTHER

## 2019-06-24 RX ORDER — LOSARTAN POTASSIUM 100 MG/1
TABLET ORAL
Qty: 90 TABLET | Refills: 0 | Status: SHIPPED | OUTPATIENT
Start: 2019-06-24 | End: 2020-01-03

## 2019-07-21 DIAGNOSIS — M15.2 BOUCHARD NODES (DJD HAND): ICD-10-CM

## 2019-07-22 RX ORDER — MELOXICAM 7.5 MG/1
TABLET ORAL
Qty: 30 TABLET | Refills: 0 | Status: SHIPPED | OUTPATIENT
Start: 2019-07-22 | End: 2019-09-01 | Stop reason: SDUPTHER

## 2019-07-29 RX ORDER — FELODIPINE 10 MG/1
TABLET, EXTENDED RELEASE ORAL
Qty: 90 TABLET | Refills: 0 | Status: SHIPPED | OUTPATIENT
Start: 2019-07-29 | End: 2019-09-20 | Stop reason: SDUPTHER

## 2019-08-16 DIAGNOSIS — I10 BENIGN ESSENTIAL HYPERTENSION: ICD-10-CM

## 2019-08-16 RX ORDER — POTASSIUM CHLORIDE 20MEQ/15ML
LIQUID (ML) ORAL
Qty: 240 ML | Refills: 1 | Status: SHIPPED | OUTPATIENT
Start: 2019-08-16 | End: 2019-09-20 | Stop reason: SDUPTHER

## 2019-09-01 DIAGNOSIS — Z91.09 ENVIRONMENTAL ALLERGIES: ICD-10-CM

## 2019-09-01 DIAGNOSIS — M15.2 BOUCHARD NODES (DJD HAND): ICD-10-CM

## 2019-09-03 RX ORDER — MONTELUKAST SODIUM 10 MG/1
TABLET ORAL
Qty: 90 TABLET | Refills: 3 | Status: SHIPPED | OUTPATIENT
Start: 2019-09-03 | End: 2020-08-19

## 2019-09-03 RX ORDER — MELOXICAM 7.5 MG/1
TABLET ORAL
Qty: 30 TABLET | Refills: 0 | Status: SHIPPED | OUTPATIENT
Start: 2019-09-03 | End: 2019-09-27 | Stop reason: SDUPTHER

## 2019-09-05 ENCOUNTER — TRANSCRIBE ORDERS (OUTPATIENT)
Dept: ADMINISTRATIVE | Facility: HOSPITAL | Age: 74
End: 2019-09-05

## 2019-09-05 DIAGNOSIS — Z12.31 SCREENING MAMMOGRAM, ENCOUNTER FOR: Primary | ICD-10-CM

## 2019-09-20 ENCOUNTER — OFFICE VISIT (OUTPATIENT)
Dept: FAMILY MEDICINE CLINIC | Facility: CLINIC | Age: 74
End: 2019-09-20

## 2019-09-20 VITALS
OXYGEN SATURATION: 98 % | DIASTOLIC BLOOD PRESSURE: 72 MMHG | SYSTOLIC BLOOD PRESSURE: 128 MMHG | HEART RATE: 74 BPM | HEIGHT: 65 IN | TEMPERATURE: 98.6 F | BODY MASS INDEX: 25.66 KG/M2 | WEIGHT: 154 LBS

## 2019-09-20 DIAGNOSIS — E11.29 TYPE 2 DIABETES MELLITUS WITH MICROALBUMINURIA, WITHOUT LONG-TERM CURRENT USE OF INSULIN (HCC): ICD-10-CM

## 2019-09-20 DIAGNOSIS — R80.9 TYPE 2 DIABETES MELLITUS WITH MICROALBUMINURIA, WITHOUT LONG-TERM CURRENT USE OF INSULIN (HCC): ICD-10-CM

## 2019-09-20 DIAGNOSIS — I10 BENIGN ESSENTIAL HYPERTENSION: Primary | ICD-10-CM

## 2019-09-20 PROCEDURE — 99213 OFFICE O/P EST LOW 20 MIN: CPT | Performed by: NURSE PRACTITIONER

## 2019-09-20 NOTE — PROGRESS NOTES
"Subjective   Radha Acosta is a 73 y.o. female who presents for a follow up on diabetes.     Diabetes   She presents for her follow-up diabetic visit. She has type 2 diabetes mellitus. Her disease course has been stable. There are no diabetic associated symptoms. There are no hypoglycemic complications. Symptoms are stable. Diabetic complications include nephropathy. Pertinent negatives for diabetic complications include no peripheral neuropathy. Risk factors for coronary artery disease include dyslipidemia, diabetes mellitus, post-menopausal and hypertension. Current diabetic treatment includes oral agent (monotherapy). She is compliant with treatment all of the time. Her weight is decreasing steadily. She is following a generally healthy (very little meat, +fresh vegetables) diet. She participates in exercise daily. An ACE inhibitor/angiotensin II receptor blocker is being taken. She does not see a podiatrist.Eye exam is not current.      The following portions of the patient's history were reviewed and updated as appropriate: allergies, current medications, past family history, past medical history, past social history, past surgical history and problem list.    Review of Systems   Constitutional: Negative for chills and fever.   HENT: Positive for congestion.    Respiratory: Positive for cough.    Cardiovascular: Positive for palpitations (flare when allergies flare).   Musculoskeletal: Negative for arthralgias.   Skin: Negative.    Allergic/Immunologic: Positive for environmental allergies.   Psychiatric/Behavioral: Negative.    /72   Pulse 74   Temp 98.6 °F (37 °C) (Oral)   Ht 165.1 cm (65\")   Wt 69.9 kg (154 lb)   SpO2 98%   BMI 25.63 kg/m²     Objective   Physical Exam   Constitutional: She appears well-developed and well-nourished.   Neck: Normal range of motion. Neck supple. No thyromegaly present.   Cardiovascular: Normal rate, regular rhythm and normal heart sounds. "   Pulmonary/Chest: Effort normal and breath sounds normal.    Radha had a diabetic foot exam performed today.   During the foot exam she had a monofilament test performed (see scanned).  Lymphadenopathy:     She has no cervical adenopathy.   Skin: Skin is warm and dry.   Psychiatric: She has a normal mood and affect. Her behavior is normal. Judgment and thought content normal.   Nursing note and vitals reviewed.    Assessment/Plan   Problems Addressed this Visit        Cardiovascular and Mediastinum    Benign essential hypertension - Primary       Endocrine    Type 2 diabetes mellitus with microalbuminuria, without long-term current use of insulin (CMS/[facility])    Relevant Orders    Hemoglobin A1c (Completed)    Microalbumin / Creatinine Urine Ratio - Urine, Clean Catch (Completed)      HTN--controlled today--check microalbumin  T2DM--controlled--continue metformin, diet and exercise FU 6 months

## 2019-09-20 NOTE — PROGRESS NOTES
The ABCs of the Annual Wellness Visit  Subsequent Medicare Wellness Visit    Chief Complaint   Patient presents with   • Follow-up       Subjective   History of Present Illness:  Radha Acosta is a 73 y.o. female who presents for a Subsequent Medicare Wellness Visit.    HEALTH RISK ASSESSMENT    Recent Hospitalizations:  No hospitalization(s) within the last year.    Current Medical Providers:  Patient Care Team:  Beatriz Bernal APRN as PCP - General  Beatriz Bernal APRN as PCP - Family Medicine    Smoking Status:  Social History     Tobacco Use   Smoking Status Never Smoker       Alcohol Consumption:  Social History     Substance and Sexual Activity   Alcohol Use Not on file       Depression Screen:   PHQ-2/PHQ-9 Depression Screening 9/20/2019   Little interest or pleasure in doing things 0   Feeling down, depressed, or hopeless 0   Trouble falling or staying asleep, or sleeping too much 0   Feeling tired or having little energy 0   Poor appetite or overeating 0   Feeling bad about yourself - or that you are a failure or have let yourself or your family down 0   Trouble concentrating on things, such as reading the newspaper or watching television 0   Moving or speaking so slowly that other people could have noticed. Or the opposite - being so fidgety or restless that you have been moving around a lot more than usual 0   Thoughts that you would be better off dead, or of hurting yourself in some way 0   Total Score 0   If you checked off any problems, how difficult have these problems made it for you to do your work, take care of things at home, or get along with other people? Not difficult at all       Fall Risk Screen:  STEADI Fall Risk Assessment has not been completed.    Health Habits and Functional and Cognitive Screening:  Functional & Cognitive Status 9/20/2019   Do you have difficulty preparing food and eating? No   Do you have difficulty bathing yourself, getting dressed or grooming  yourself? No   Do you have difficulty using the toilet? No   Do you have difficulty moving around from place to place? No   Do you have trouble with steps or getting out of a bed or a chair? No   Current Diet Well Balanced Diet   Dental Exam Not up to date   Eye Exam Not up to date   Exercise (times per week) 0 times per week   Current Exercise Activities Include No Regular Exercise   Do you need help using the phone?  No   Are you deaf or do you have serious difficulty hearing?  No   Do you need help with transportation? No   Do you need help shopping? No   Do you need help preparing meals?  No   Do you need help with housework?  No   Do you need help with laundry? No   Do you need help taking your medications? No   Do you need help managing money? No   Do you ever drive or ride in a car without wearing a seat belt? No   Have you felt unusual stress, anger or loneliness in the last month? No   Who do you live with? Alone   If you need help, do you have trouble finding someone available to you? No   Have you been bothered in the last four weeks by sexual problems? No   Do you have difficulty concentrating, remembering or making decisions? No         Does the patient have evidence of cognitive impairment? No    Asprin use counseling:Taking ASA appropriately as indicated    Age-appropriate Screening Schedule:  Refer to the list below for future screening recommendations based on patient's age, sex and/or medical conditions. Orders for these recommended tests are listed in the plan section. The patient has been provided with a written plan.    Health Maintenance   Topic Date Due   • TDAP/TD VACCINES (1 - Tdap) 10/07/1966   • PNEUMOCOCCAL VACCINES (65+ LOW/MEDIUM RISK) (2 of 2 - PPSV23) 09/20/2017   • HEMOGLOBIN A1C  07/25/2019   • INFLUENZA VACCINE  08/01/2019   • DIABETIC EYE EXAM  10/28/2019 (Originally 12/5/2018)   • LIPID PANEL  01/25/2020   • URINE MICROALBUMIN  01/25/2020   • DIABETIC FOOT EXAM  09/20/2020   •  MAMMOGRAM  10/26/2020   • COLONOSCOPY  09/20/2026          The following portions of the patient's history were reviewed and updated as appropriate: allergies, current medications, past family history, past medical history, past social history, past surgical history and problem list.    Outpatient Medications Prior to Visit   Medication Sig Dispense Refill   • aspirin 81 MG chewable tablet Chew daily.     • atorvastatin (LIPITOR) 40 MG tablet Take 1 tablet by mouth Every Night. 90 tablet 1   • Cetirizine HCl 10 MG capsule Take  by mouth.     • clopidogrel (PLAVIX) 75 MG tablet TAKE 1 TABLET BY MOUTH ONCE DAILY 90 tablet 1   • felodipine (PLENDIL) 10 MG 24 hr tablet TAKE 1 TABLET BY MOUTH ONCE DAILY 30 tablet 0   • losartan (COZAAR) 100 MG tablet TAKE 1 TABLET BY MOUTH ONCE DAILY 90 tablet 0   • meloxicam (MOBIC) 7.5 MG tablet TAKE 1 TABLET BY MOUTH ONCE DAILY 30 tablet 0   • metFORMIN (GLUCOPHAGE) 500 MG tablet Take 1 tablet by mouth 2 (Two) Times a Day With Meals. 60 tablet 3   • metoprolol succinate XL (TOPROL-XL) 50 MG 24 hr tablet Take 1 tablet by mouth Daily. 90 tablet 0   • montelukast (SINGULAIR) 10 MG tablet TAKE 1 TABLET BY MOUTH ONCE DAILY EVERY  NIGHT 90 tablet 3   • potassium chloride (KAYCIEL) 20 MEQ/15ML (10%) solution TAKE 15 MLS BY MOUTH ONCE DAILY 480 mL 0   • selenium sulfide (SELSUN) 2.5 % shampoo Apply  topically Daily As Needed for Irritation or Itching. 118 mL 0   • triamcinolone (KENALOG) 0.1 % cream APPLY ONE APPLICATION TOPICALLY TWICE DAILY 45 g 1   • atorvastatin (LIPITOR) 40 MG tablet TAKE 1 TABLET BY MOUTH ONCE DAILY EVERY  NIGHT 90 tablet 0   • clopidogrel (PLAVIX) 75 MG tablet TAKE 1 TABLET BY MOUTH ONCE DAILY 90 tablet 0   • felodipine (PLENDIL) 10 MG 24 hr tablet TAKE 1 TABLET BY MOUTH ONCE DAILY 90 tablet 0   • potassium chloride (KAYCIEL) 20 MEQ/15ML (10%) solution TAKE 15 ML BY MOUTH ONCE DAILY 240 mL 1   • amoxicillin-clavulanate (AUGMENTIN) 875-125 MG per tablet Take 1 tablet by  "mouth 2 (Two) Times a Day. 14 tablet 0     No facility-administered medications prior to visit.        Patient Active Problem List   Diagnosis   • Atopic rhinitis   • Acute otitis media   • Bell's palsy   • Benign essential hypertension   • Cerebral atherosclerosis   • Type 2 diabetes mellitus with microalbuminuria, without long-term current use of insulin (CMS/[facility])   • Familial hypertriglyceridemia   • Low back pain   • Palpitations   • Saul nodes (DJD hand)       Advanced Care Planning:  Patient does not have an advance directive - information provided to the patient today    Review of Systems    Compared to one year ago, the patient feels her physical health is the same.  Compared to one year ago, the patient feels her mental health is the same.    Reviewed chart for potential of high risk medication in the elderly: yes  Reviewed chart for potential of harmful drug interactions in the elderly:yes    Objective         Vitals:    09/20/19 1305   BP: 128/72   Pulse: 74   Temp: 98.6 °F (37 °C)   TempSrc: Oral   SpO2: 98%   Weight: 69.9 kg (154 lb)   Height: 165.1 cm (65\")   PainSc: 0-No pain       Body mass index is 25.63 kg/m².  Discussed the patient's BMI with her. The BMI is in the acceptable range.    Physical Exam          Assessment/Plan   Medicare Risks and Personalized Health Plan  CMS Preventative Services Quick Reference  Advance Directive Discussion  Cardiovascular risk  Fall Risk  Immunizations Discussed/Encouraged (specific immunizations; Td, Influenza and Prevnar )    The above risks/problems have been discussed with the patient.  Pertinent information has been shared with the patient in the After Visit Summary.  Follow up plans and orders are seen below in the Assessment/Plan Section.    There are no diagnoses linked to this encounter.  Follow Up:  No Follow-up on file.     An After Visit Summary and PPPS were given to the patient.             "

## 2019-09-21 LAB
ALBUMIN/CREAT UR: 811.5 MG/G CREAT (ref 0–30)
CREAT UR-MCNC: 67.1 MG/DL
HBA1C MFR BLD: 5.9 % (ref 4.8–5.6)
MICROALBUMIN UR-MCNC: 544.5 UG/ML

## 2019-09-23 RX ORDER — CLOPIDOGREL BISULFATE 75 MG/1
TABLET ORAL
Qty: 90 TABLET | Refills: 0 | Status: SHIPPED | OUTPATIENT
Start: 2019-09-23 | End: 2019-11-20 | Stop reason: SDUPTHER

## 2019-09-27 DIAGNOSIS — M15.2 BOUCHARD NODES (DJD HAND): ICD-10-CM

## 2019-09-27 DIAGNOSIS — E78.5 DYSLIPIDEMIA: ICD-10-CM

## 2019-09-27 RX ORDER — MELOXICAM 7.5 MG/1
TABLET ORAL
Qty: 90 TABLET | Refills: 0 | Status: SHIPPED | OUTPATIENT
Start: 2019-09-27 | End: 2019-12-30

## 2019-09-27 RX ORDER — ATORVASTATIN CALCIUM 40 MG/1
TABLET, FILM COATED ORAL
Qty: 90 TABLET | Refills: 0 | Status: SHIPPED | OUTPATIENT
Start: 2019-09-27 | End: 2019-11-20 | Stop reason: SDUPTHER

## 2019-10-08 RX ORDER — METOPROLOL SUCCINATE 50 MG/1
TABLET, EXTENDED RELEASE ORAL
Qty: 90 TABLET | Refills: 2 | Status: SHIPPED | OUTPATIENT
Start: 2019-10-08 | End: 2019-11-20 | Stop reason: SDUPTHER

## 2019-10-08 RX ORDER — LOSARTAN POTASSIUM 100 MG/1
TABLET ORAL
Qty: 90 TABLET | Refills: 0 | Status: SHIPPED | OUTPATIENT
Start: 2019-10-08 | End: 2019-11-20 | Stop reason: SDUPTHER

## 2019-10-28 ENCOUNTER — HOSPITAL ENCOUNTER (OUTPATIENT)
Dept: MAMMOGRAPHY | Facility: HOSPITAL | Age: 74
Discharge: HOME | End: 2019-10-28
Admitting: NURSE PRACTITIONER

## 2019-10-28 DIAGNOSIS — Z12.31 SCREENING MAMMOGRAM, ENCOUNTER FOR: ICD-10-CM

## 2019-10-28 PROCEDURE — 77063 BREAST TOMOSYNTHESIS BI: CPT

## 2019-10-28 PROCEDURE — 77067 SCR MAMMO BI INCL CAD: CPT

## 2019-11-07 RX ORDER — FELODIPINE 10 MG/1
TABLET, EXTENDED RELEASE ORAL
Qty: 90 TABLET | Refills: 0 | Status: SHIPPED | OUTPATIENT
Start: 2019-11-07 | End: 2019-11-20 | Stop reason: SDUPTHER

## 2019-11-20 ENCOUNTER — OFFICE VISIT (OUTPATIENT)
Dept: FAMILY MEDICINE CLINIC | Facility: CLINIC | Age: 74
End: 2019-11-20

## 2019-11-20 VITALS
WEIGHT: 152 LBS | HEART RATE: 52 BPM | OXYGEN SATURATION: 98 % | BODY MASS INDEX: 25.33 KG/M2 | SYSTOLIC BLOOD PRESSURE: 122 MMHG | HEIGHT: 65 IN | TEMPERATURE: 98.4 F | DIASTOLIC BLOOD PRESSURE: 78 MMHG

## 2019-11-20 DIAGNOSIS — J01.00 ACUTE NON-RECURRENT MAXILLARY SINUSITIS: Primary | ICD-10-CM

## 2019-11-20 DIAGNOSIS — G57.01 PIRIFORMIS SYNDROME OF RIGHT SIDE: ICD-10-CM
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
## 2019-11-20 PROCEDURE — 99213 OFFICE O/P EST LOW 20 MIN: CPT | Performed by: NURSE PRACTITIONER

## 2019-11-20 RX ORDER — AMOXICILLIN 500 MG/1
1000 CAPSULE ORAL 2 TIMES DAILY
Qty: 28 CAPSULE | Refills: 0 | Status: SHIPPED | OUTPATIENT
Start: 2019-11-20 | End: 2019-12-10

## 2019-11-20 NOTE — PROGRESS NOTES
"Carloz Acosta is a 74 y.o. female who presents c/o sore throat, headache, drainage x several weeks.     History of Present Illness   2-3 weeks of symptoms with ST with hoarseness and cough, headache, drainage. Fatigue. Taking singular and zyrtec. Also treating with hot herbal teas. Ears are popping    Outside in yard cutting shrubs 2 months ago, has lesions to R arm and leg, applying triamcinolone and drying out now  Diet includes oatmeal with fruit, berry and root smoothie with flaxseed and spinach for lunch, then fish for supper  The following portions of the patient's history were reviewed and updated as appropriate: allergies, current medications, past family history, past medical history, past social history, past surgical history and problem list.    Review of Systems   Constitutional: Positive for fatigue. Negative for chills, fever and unexpected weight loss.   HENT: Positive for congestion, ear pain (and feel wet), postnasal drip, rhinorrhea, sore throat and voice change.    Eyes: Positive for redness and itching.   Respiratory: Positive for cough (occasionally productive).    Cardiovascular: Negative.    Gastrointestinal: Negative.    Endocrine: Negative.    Genitourinary: Negative.    Musculoskeletal: Positive for arthralgias (R hip). Negative for back pain, gait problem and bursitis.   Skin: Positive for skin lesions.   Neurological: Positive for numbness (RLE intermittent) and headache. Negative for weakness.   Hematological: Negative.    Psychiatric/Behavioral: Negative.      /78   Pulse 52   Temp 98.4 °F (36.9 °C) (Oral)   Ht 165.1 cm (65\")   Wt 68.9 kg (152 lb)   SpO2 98%   BMI 25.29 kg/m²     Objective   Physical Exam   Constitutional: She appears well-developed and well-nourished. No distress.   HENT:   Head: Normocephalic and atraumatic.   Right Ear: Tympanic membrane is injected and bulging. A middle ear effusion is present.   Left Ear: Tympanic membrane " normal.   Nose: Mucosal edema present. Right sinus exhibits maxillary sinus tenderness.   Mouth/Throat: Uvula is midline and mucous membranes are normal. Posterior oropharyngeal erythema present. No oropharyngeal exudate.   Eyes: Right eye exhibits no discharge. Left eye exhibits no discharge. Right conjunctiva is injected. Left conjunctiva is injected.   Neck: Normal range of motion. Neck supple. No tracheal deviation present. No thyromegaly present.   Cardiovascular: Normal rate, regular rhythm and normal heart sounds.   Pulmonary/Chest: Effort normal and breath sounds normal.   Musculoskeletal: Normal range of motion. She exhibits no edema.   Lymphadenopathy:     She has no cervical adenopathy.   Skin: Skin is warm and dry.   Raised 3mm papules scattered to R arm and R leg   Psychiatric: She has a normal mood and affect. Her behavior is normal. Judgment and thought content normal.   Nursing note and vitals reviewed.    Assessment/Plan   Problems Addressed this Visit     None      Visit Diagnoses     Acute non-recurrent maxillary sinusitis    -  Primary    Relevant Medications    amoxicillin (AMOXIL) 500 MG capsule    Piriformis syndrome of right side        Bug bite, initial encounter            Sinusitis--continue symptom controllers and start amoxil. Follow up if not settling, 1-2 weeks  Piriformis syndrome--stretching taught  Bug bites--continue steroid cream

## 2019-12-04 RX ORDER — DOXYCYCLINE HYCLATE 100 MG/1
100 CAPSULE ORAL 2 TIMES DAILY
Qty: 14 CAPSULE | Refills: 0 | Status: SHIPPED | OUTPATIENT
Start: 2019-12-04 | End: 2019-12-10

## 2019-12-04 RX ORDER — GUAIFENESIN AND CODEINE PHOSPHATE 100; 10 MG/5ML; MG/5ML
5 SOLUTION ORAL 3 TIMES DAILY PRN
Qty: 118 ML | Refills: 0 | Status: SHIPPED | OUTPATIENT
Start: 2019-12-04 | End: 2019-12-10

## 2019-12-04 NOTE — TELEPHONE ENCOUNTER
Pt was prescribed amoxil on 11-20-19. She says she was only able to get 3 doses down because it caused a severe upset stomach and burned when swallowing the pills. She started to feel better but now her throat is hurting again and she has a severe cough. She is asking for an alternate antibiotic and something for the cough.

## 2019-12-04 NOTE — TELEPHONE ENCOUNTER
Doxycycline 100mg BID #14 0rf and guaifenesin with codeine 5 ML TID prn cough #118 mL 0rf if stephanie ok. Follow up if not settling or worsening symptoms.

## 2019-12-08 DIAGNOSIS — I10 BENIGN ESSENTIAL HYPERTENSION: ICD-10-CM

## 2019-12-09 ENCOUNTER — TELEPHONE (OUTPATIENT)
Dept: FAMILY MEDICINE CLINIC | Facility: CLINIC | Age: 74
End: 2019-12-09

## 2019-12-09 RX ORDER — POTASSIUM CHLORIDE 20MEQ/15ML
LIQUID (ML) ORAL
Qty: 240 ML | Refills: 1 | Status: SHIPPED | OUTPATIENT
Start: 2019-12-09 | End: 2019-12-10 | Stop reason: SDUPTHER

## 2019-12-10 ENCOUNTER — OFFICE VISIT (OUTPATIENT)
Dept: FAMILY MEDICINE CLINIC | Facility: CLINIC | Age: 74
End: 2019-12-10

## 2019-12-10 VITALS
HEIGHT: 65 IN | OXYGEN SATURATION: 98 % | WEIGHT: 151 LBS | HEART RATE: 73 BPM | SYSTOLIC BLOOD PRESSURE: 128 MMHG | BODY MASS INDEX: 25.16 KG/M2 | DIASTOLIC BLOOD PRESSURE: 74 MMHG | TEMPERATURE: 98.1 F

## 2019-12-10 DIAGNOSIS — R42 DIZZINESS: ICD-10-CM

## 2019-12-10 DIAGNOSIS — R00.2 PALPITATIONS: Primary | ICD-10-CM

## 2019-12-10 DIAGNOSIS — J06.9 ACUTE URI: ICD-10-CM

## 2019-12-10 PROCEDURE — 99214 OFFICE O/P EST MOD 30 MIN: CPT | Performed by: NURSE PRACTITIONER

## 2019-12-10 RX ORDER — METOPROLOL SUCCINATE 100 MG/1
100 TABLET, EXTENDED RELEASE ORAL DAILY
Qty: 90 TABLET | Refills: 0 | Status: SHIPPED | OUTPATIENT
Start: 2019-12-10 | End: 2020-03-13

## 2019-12-10 NOTE — PROGRESS NOTES
"Subjective   Radha Acosta is a 74 y.o. female who presents for a follow up on sinusitis. Still feeling bad and now having episodes of lightheadedness and shakes.     History of Present Illness   Treated on 11/20 with amoxil for sinusitis--reports felt a little better while on antibiotics and voice was coming back, so did a few errands, then suddenly lightheaded, hot and dizzy, then chilled with shakes. Sleeping a lot. Wonders if forgot potassium that day. Took a dose when got home and felt better afterwards. Haven't felt that way again. Residual symptoms include loss of appetite, upset stomach, generalized weakness. Is drinking pedialyte, ensure and chicken noodle soup. No dizziness today. Still some voice loss. Cough is only minimal, still some congestion.   The following portions of the patient's history were reviewed and updated as appropriate: allergies, current medications, past family history, past medical history, past social history, past surgical history and problem list.    Review of Systems   Constitutional: Positive for activity change, appetite change, chills, diaphoresis and fatigue. Negative for unexpected weight gain and unexpected weight loss.   HENT: Positive for congestion, ear pain and voice change. Negative for ear discharge, postnasal drip, sinus pressure and sore throat.    Respiratory: Positive for cough.    Cardiovascular: Negative.    Gastrointestinal: Positive for indigestion. Negative for abdominal pain, nausea, rectal pain and vomiting.   Endocrine: Negative.    Musculoskeletal: Negative for arthralgias.   Skin: Negative.    Neurological: Positive for dizziness and light-headedness. Negative for headache.   Hematological: Negative.    Psychiatric/Behavioral: Negative.      /74   Pulse 73   Temp 98.1 °F (36.7 °C) (Oral)   Ht 165.1 cm (65\")   Wt 68.5 kg (151 lb)   SpO2 98%   BMI 25.13 kg/m²     Objective   Physical Exam   Constitutional: She appears well-developed " and well-nourished. No distress.   HENT:   Right Ear: Tympanic membrane normal.   Left Ear: A middle ear effusion is present.   Nose: Nose normal. Right sinus exhibits no maxillary sinus tenderness and no frontal sinus tenderness. Left sinus exhibits no maxillary sinus tenderness and no frontal sinus tenderness.   Mouth/Throat: Uvula is midline and mucous membranes are normal. Posterior oropharyngeal erythema present.   Neck: Normal range of motion. Neck supple. No tracheal deviation present. No thyromegaly present.   Cardiovascular: Normal rate, regular rhythm, normal heart sounds and normal pulses.  Occasional extrasystoles are present.   Pulmonary/Chest: Effort normal and breath sounds normal.   Abdominal: Soft. There is no tenderness.   Lymphadenopathy:     She has no cervical adenopathy.   Skin: Skin is warm and dry. Capillary refill takes less than 2 seconds.   Psychiatric: She has a normal mood and affect. Her behavior is normal. Judgment and thought content normal.     Assessment/Plan   Problems Addressed this Visit        Cardiovascular and Mediastinum    Palpitations - Primary    Relevant Medications    metoprolol succinate XL (TOPROL-XL) 100 MG 24 hr tablet    Other Relevant Orders    Basic Metabolic Panel    Magnesium    TSH      Other Visit Diagnoses     Dizziness        Relevant Orders    Basic Metabolic Panel    Magnesium    TSH    Acute URI            Palpitations--increase metoprolol to suppress symptomatic PVCs--check lytes, if not settling consider repeat holter  Dizziness--check labs  Acute URI--still settling  FU prn

## 2019-12-11 LAB
BUN SERPL-MCNC: 11 MG/DL (ref 8–23)
BUN/CREAT SERPL: 16.9 (ref 7–25)
CALCIUM SERPL-MCNC: 9 MG/DL (ref 8.6–10.5)
CHLORIDE SERPL-SCNC: 97 MMOL/L (ref 98–107)
CO2 SERPL-SCNC: 26.2 MMOL/L (ref 22–29)
CREAT SERPL-MCNC: 0.65 MG/DL (ref 0.57–1)
GLUCOSE SERPL-MCNC: 114 MG/DL (ref 65–99)
MAGNESIUM SERPL-MCNC: 1.5 MG/DL (ref 1.6–2.4)
POTASSIUM SERPL-SCNC: 3.8 MMOL/L (ref 3.5–5.2)
SODIUM SERPL-SCNC: 136 MMOL/L (ref 136–145)
TSH SERPL DL<=0.005 MIU/L-ACNC: 1.15 UIU/ML (ref 0.27–4.2)

## 2019-12-12 DIAGNOSIS — R79.0 LOW MAGNESIUM LEVEL: Primary | ICD-10-CM

## 2019-12-12 RX ORDER — MAGNESIUM OXIDE 400 MG/1
400 TABLET ORAL DAILY
Qty: 30 TABLET | Refills: 5 | Status: SHIPPED | OUTPATIENT
Start: 2019-12-12 | End: 2020-06-04

## 2019-12-12 NOTE — PROGRESS NOTES
Please call the patient regarding her abnormal result. Potassium is adequately replaced, continue replacement. Magnesium is low. Start magnesium 400 mg daily #30 5rf recheck magnesium in 2-4 weeks

## 2019-12-16 RX ORDER — CLOPIDOGREL BISULFATE 75 MG/1
TABLET ORAL
Qty: 90 TABLET | Refills: 0 | Status: SHIPPED | OUTPATIENT
Start: 2019-12-16 | End: 2020-02-05 | Stop reason: SDUPTHER

## 2019-12-26 ENCOUNTER — RESULTS ENCOUNTER (OUTPATIENT)
Dept: FAMILY MEDICINE CLINIC | Facility: CLINIC | Age: 74
End: 2019-12-26

## 2019-12-26 DIAGNOSIS — R79.0 LOW MAGNESIUM LEVEL: ICD-10-CM

## 2019-12-27 DIAGNOSIS — M15.2 BOUCHARD NODES (DJD HAND): ICD-10-CM

## 2019-12-30 RX ORDER — MELOXICAM 7.5 MG/1
TABLET ORAL
Qty: 30 TABLET | Refills: 0 | Status: SHIPPED | OUTPATIENT
Start: 2019-12-30 | End: 2020-01-28

## 2020-01-03 DIAGNOSIS — E78.5 DYSLIPIDEMIA: ICD-10-CM

## 2020-01-03 RX ORDER — ATORVASTATIN CALCIUM 40 MG/1
TABLET, FILM COATED ORAL
Qty: 90 TABLET | Refills: 0 | Status: SHIPPED | OUTPATIENT
Start: 2020-01-03 | End: 2020-03-26

## 2020-01-03 RX ORDER — LOSARTAN POTASSIUM 100 MG/1
TABLET ORAL
Qty: 90 TABLET | Refills: 0 | Status: SHIPPED | OUTPATIENT
Start: 2020-01-03 | End: 2020-03-26

## 2020-01-17 RX ORDER — FELODIPINE 10 MG/1
TABLET, EXTENDED RELEASE ORAL
Qty: 90 TABLET | Refills: 0 | Status: SHIPPED | OUTPATIENT
Start: 2020-01-17 | End: 2020-04-24

## 2020-01-18 LAB — MAGNESIUM SERPL-MCNC: 1.6 MG/DL (ref 1.6–2.4)

## 2020-01-28 DIAGNOSIS — M15.2 BOUCHARD NODES (DJD HAND): ICD-10-CM

## 2020-01-28 RX ORDER — MELOXICAM 7.5 MG/1
TABLET ORAL
Qty: 30 TABLET | Refills: 0 | Status: SHIPPED | OUTPATIENT
Start: 2020-01-28 | End: 2020-02-28

## 2020-02-04 ENCOUNTER — TELEPHONE (OUTPATIENT)
Dept: FAMILY MEDICINE CLINIC | Facility: CLINIC | Age: 75
End: 2020-02-04

## 2020-02-04 NOTE — TELEPHONE ENCOUNTER
Pt says she has a cough that is productive with yellow sputum. The cough is keeping her up at night and causing rib pain. It started 4 days ago. She wants antibiotics and a cough syrup called to her pharmacy to prevent this from turning into bronchitis. She cannot use any cough med with codeine in it.

## 2020-02-05 ENCOUNTER — OFFICE VISIT (OUTPATIENT)
Dept: FAMILY MEDICINE CLINIC | Facility: CLINIC | Age: 75
End: 2020-02-05

## 2020-02-05 VITALS
HEIGHT: 65 IN | SYSTOLIC BLOOD PRESSURE: 130 MMHG | HEART RATE: 57 BPM | BODY MASS INDEX: 24.99 KG/M2 | OXYGEN SATURATION: 99 % | DIASTOLIC BLOOD PRESSURE: 74 MMHG | TEMPERATURE: 98.3 F | WEIGHT: 150 LBS

## 2020-02-05 DIAGNOSIS — J40 BRONCHITIS: Primary | ICD-10-CM

## 2020-02-05 DIAGNOSIS — R79.0 LOW MAGNESIUM LEVEL: ICD-10-CM

## 2020-02-05 PROCEDURE — 99213 OFFICE O/P EST LOW 20 MIN: CPT | Performed by: NURSE PRACTITIONER

## 2020-02-05 RX ORDER — DEXTROMETHORPHAN HYDROBROMIDE AND PROMETHAZINE HYDROCHLORIDE 15; 6.25 MG/5ML; MG/5ML
5 SYRUP ORAL 4 TIMES DAILY PRN
Qty: 118 ML | Refills: 0 | Status: SHIPPED | OUTPATIENT
Start: 2020-02-05 | End: 2020-02-17

## 2020-02-05 RX ORDER — METHYLPREDNISOLONE 4 MG/1
TABLET ORAL
Qty: 21 TABLET | Refills: 0 | Status: SHIPPED | OUTPATIENT
Start: 2020-02-05 | End: 2020-06-30

## 2020-02-05 NOTE — PROGRESS NOTES
"Carloz Acosta is a 74 y.o. female who presents c/o cough, congestion x 5 days.     History of Present Illness   Sudden onset of ST 5 days ago, gradually increased cough, productive green sputum. No fever. No recent travel.   The following portions of the patient's history were reviewed and updated as appropriate: allergies, current medications, past family history, past medical history, past social history, past surgical history and problem list.    Review of Systems   Constitutional: Positive for activity change, diaphoresis and fatigue.   HENT: Positive for congestion, postnasal drip, rhinorrhea, sneezing and sore throat.    Respiratory: Positive for cough (smokers cough, though non smoker, productive green sputum).    Cardiovascular: Negative.    Gastrointestinal: Negative.  Negative for abdominal pain, constipation (bowels slightly more firm) and diarrhea.   Musculoskeletal: Negative.    Skin: Negative.    Neurological: Positive for headache.   Psychiatric/Behavioral: Positive for sleep disturbance.     /74   Pulse 57   Temp 98.3 °F (36.8 °C) (Oral)   Ht 165.1 cm (65\")   Wt 68 kg (150 lb)   SpO2 99%   BMI 24.96 kg/m²     Objective   Physical Exam   Constitutional: She is oriented to person, place, and time. She appears well-developed and well-nourished.   HENT:   Right Ear: A middle ear effusion is present.   Left Ear: A middle ear effusion is present.   Nose: Nose normal. Right sinus exhibits no maxillary sinus tenderness and no frontal sinus tenderness. Left sinus exhibits no maxillary sinus tenderness and no frontal sinus tenderness.   Mouth/Throat: Uvula is midline, oropharynx is clear and moist and mucous membranes are normal.   Neck: Normal range of motion. No tracheal deviation present. No thyromegaly present.   Cardiovascular: Normal rate, regular rhythm and normal heart sounds.   Pulmonary/Chest: Effort normal and breath sounds normal.   Lymphadenopathy:     She has " no cervical adenopathy.   Neurological: She is alert and oriented to person, place, and time.   Skin: Capillary refill takes less than 2 seconds.   Psychiatric: She has a normal mood and affect. Her behavior is normal. Judgment and thought content normal.   Nursing note and vitals reviewed.    Assessment/Plan   Problems Addressed this Visit     None      Visit Diagnoses     Bronchitis    -  Primary    Relevant Medications    methylPREDNISolone (MEDROL) 4 MG tablet    promethazine-dextromethorphan (PROMETHAZINE-DM) 6.25-15 MG/5ML syrup    Low magnesium level            Bronchitis--likely viral in nature. Recommend suppress cough and settle inflammation with steroids. FU if not improving  Low mag--Need to increase mag in diet, has slacked off fresh vegetables and fish. Encouraged to restart. Will update levels at [facility]

## 2020-02-14 ENCOUNTER — TELEPHONE (OUTPATIENT)
Dept: FAMILY MEDICINE CLINIC | Facility: CLINIC | Age: 75
End: 2020-02-14

## 2020-02-14 RX ORDER — AMOXICILLIN 500 MG/1
1000 CAPSULE ORAL 3 TIMES DAILY
Qty: 40 CAPSULE | Refills: 0 | Status: SHIPPED | OUTPATIENT
Start: 2020-02-14 | End: 2020-06-30

## 2020-02-14 NOTE — TELEPHONE ENCOUNTER
Pt says she has finished the steroids and cough medication and she is feeling no better. She has thick yellow mucus, pressure in head and a cough. She wants antibiotic at this point. Please advise.

## 2020-02-17 DIAGNOSIS — J40 BRONCHITIS: ICD-10-CM

## 2020-02-17 RX ORDER — DEXTROMETHORPHAN HYDROBROMIDE AND PROMETHAZINE HYDROCHLORIDE 15; 6.25 MG/5ML; MG/5ML
SYRUP ORAL
Qty: 118 ML | Refills: 0 | Status: SHIPPED | OUTPATIENT
Start: 2020-02-17 | End: 2020-03-26

## 2020-02-28 DIAGNOSIS — M15.2 BOUCHARD NODES (DJD HAND): ICD-10-CM

## 2020-02-28 RX ORDER — MELOXICAM 7.5 MG/1
TABLET ORAL
Qty: 30 TABLET | Refills: 0 | OUTPATIENT
Start: 2020-02-28

## 2020-02-28 RX ORDER — MELOXICAM 7.5 MG/1
TABLET ORAL
Qty: 90 TABLET | Refills: 0 | Status: SHIPPED | OUTPATIENT
Start: 2020-02-28 | End: 2020-05-18

## 2020-03-13 DIAGNOSIS — R00.2 PALPITATIONS: ICD-10-CM

## 2020-03-13 RX ORDER — METOPROLOL SUCCINATE 100 MG/1
TABLET, EXTENDED RELEASE ORAL
Qty: 90 TABLET | Refills: 0 | Status: SHIPPED | OUTPATIENT
Start: 2020-03-13 | End: 2020-06-04

## 2020-03-13 RX ORDER — CLOPIDOGREL BISULFATE 75 MG/1
TABLET ORAL
Qty: 90 TABLET | Refills: 0 | Status: SHIPPED | OUTPATIENT
Start: 2020-03-13 | End: 2020-06-04

## 2020-03-16 DIAGNOSIS — I10 BENIGN ESSENTIAL HYPERTENSION: ICD-10-CM

## 2020-03-16 RX ORDER — POTASSIUM CHLORIDE 20MEQ/15ML
LIQUID (ML) ORAL
Qty: 480 ML | Refills: 0 | Status: SHIPPED | OUTPATIENT
Start: 2020-03-16 | End: 2020-06-22 | Stop reason: SDUPTHER

## 2020-03-26 DIAGNOSIS — J40 BRONCHITIS: ICD-10-CM

## 2020-03-26 DIAGNOSIS — E78.5 DYSLIPIDEMIA: ICD-10-CM

## 2020-03-26 RX ORDER — DEXTROMETHORPHAN HYDROBROMIDE AND PROMETHAZINE HYDROCHLORIDE 15; 6.25 MG/5ML; MG/5ML
SYRUP ORAL
Qty: 118 ML | Refills: 0 | Status: SHIPPED | OUTPATIENT
Start: 2020-03-26 | End: 2020-06-30

## 2020-03-26 RX ORDER — LOSARTAN POTASSIUM 100 MG/1
TABLET ORAL
Qty: 90 TABLET | Refills: 0 | Status: SHIPPED | OUTPATIENT
Start: 2020-03-26 | End: 2020-06-26

## 2020-03-26 RX ORDER — ATORVASTATIN CALCIUM 40 MG/1
TABLET, FILM COATED ORAL
Qty: 90 TABLET | Refills: 0 | Status: SHIPPED | OUTPATIENT
Start: 2020-03-26 | End: 2020-06-26

## 2020-03-26 NOTE — TELEPHONE ENCOUNTER
Please advise    78 F with Left temporal headache and elevated ESR . Her last dose of Xarelto was on 1/25 PM    1.  R/O temporal arteritis   - pt for biopsy tomorrow AM   - NPO P MN   - continue to hold xarelto 78 F with Left temporal headache and elevated ESR . Her last dose of Xarelto was on 1/25 PM    1.  R/O temporal arteritis   - pt for biopsy tomorrow AM   - NPO P MN   - continue to hold xarelto   - F/U TB test results

## 2020-04-24 RX ORDER — FELODIPINE 10 MG/1
TABLET, EXTENDED RELEASE ORAL
Qty: 90 TABLET | Refills: 0 | Status: SHIPPED | OUTPATIENT
Start: 2020-04-24 | End: 2020-07-23

## 2020-05-18 DIAGNOSIS — M15.2 BOUCHARD NODES (DJD HAND): ICD-10-CM

## 2020-05-18 RX ORDER — MELOXICAM 7.5 MG/1
TABLET ORAL
Qty: 90 TABLET | Refills: 0 | Status: SHIPPED | OUTPATIENT
Start: 2020-05-18 | End: 2020-08-19

## 2020-06-04 DIAGNOSIS — R00.2 PALPITATIONS: ICD-10-CM

## 2020-06-04 RX ORDER — CLOPIDOGREL BISULFATE 75 MG/1
TABLET ORAL
Qty: 90 TABLET | Refills: 0 | Status: SHIPPED | OUTPATIENT
Start: 2020-06-04 | End: 2020-09-18

## 2020-06-04 RX ORDER — METOPROLOL SUCCINATE 100 MG/1
TABLET, EXTENDED RELEASE ORAL
Qty: 90 TABLET | Refills: 0 | Status: SHIPPED | OUTPATIENT
Start: 2020-06-04 | End: 2020-08-19

## 2020-06-22 DIAGNOSIS — I10 BENIGN ESSENTIAL HYPERTENSION: ICD-10-CM

## 2020-06-22 RX ORDER — POTASSIUM CHLORIDE 20MEQ/15ML
LIQUID (ML) ORAL
Qty: 480 ML | Refills: 0 | Status: SHIPPED | OUTPATIENT
Start: 2020-06-22 | End: 2020-09-14 | Stop reason: SDUPTHER

## 2020-06-22 NOTE — TELEPHONE ENCOUNTER
PATIENT REQUESTED TO HAVE THE MEDICATION potassium chloride (KAYCIEL) 20 MEQ/15ML (10%) solution REFILLED. SHE ONLY HAS A DAY WORTH LEFT.     PHARMACY CONFIRMED  [facility] [address] [place] [address], [address] [phone] [facility] [phone] FX     BEST CALL BACK  [phone]     PATIENT DID REQUESTED TO GET A PHONE CALL, PATIENT DID NOT STATES WHAT THE CALL WOULD BE ABOUT.

## 2020-06-26 DIAGNOSIS — E78.5 DYSLIPIDEMIA: ICD-10-CM

## 2020-06-26 RX ORDER — LOSARTAN POTASSIUM 100 MG/1
TABLET ORAL
Qty: 90 TABLET | Refills: 0 | Status: SHIPPED | OUTPATIENT
Start: 2020-06-26 | End: 2020-09-18

## 2020-06-26 RX ORDER — ATORVASTATIN CALCIUM 40 MG/1
TABLET, FILM COATED ORAL
Qty: 90 TABLET | Refills: 0 | Status: SHIPPED | OUTPATIENT
Start: 2020-06-26 | End: 2020-10-01

## 2020-06-30 ENCOUNTER — OFFICE VISIT (OUTPATIENT)
Dept: FAMILY MEDICINE CLINIC | Facility: CLINIC | Age: 75
End: 2020-06-30

## 2020-06-30 VITALS
SYSTOLIC BLOOD PRESSURE: 128 MMHG | HEART RATE: 66 BPM | BODY MASS INDEX: 25.16 KG/M2 | DIASTOLIC BLOOD PRESSURE: 64 MMHG | HEIGHT: 65 IN | TEMPERATURE: 97.4 F | OXYGEN SATURATION: 97 % | WEIGHT: 151 LBS

## 2020-06-30 DIAGNOSIS — E78.1 FAMILIAL HYPERTRIGLYCERIDEMIA: ICD-10-CM

## 2020-06-30 DIAGNOSIS — R80.9 TYPE 2 DIABETES MELLITUS WITH MICROALBUMINURIA, WITHOUT LONG-TERM CURRENT USE OF INSULIN (HCC): ICD-10-CM

## 2020-06-30 DIAGNOSIS — M15.2 BOUCHARD NODES (DJD HAND): ICD-10-CM

## 2020-06-30 DIAGNOSIS — E11.29 TYPE 2 DIABETES MELLITUS WITH MICROALBUMINURIA, WITHOUT LONG-TERM CURRENT USE OF INSULIN (HCC): ICD-10-CM

## 2020-06-30 DIAGNOSIS — I67.2 CEREBRAL ATHEROSCLEROSIS: ICD-10-CM

## 2020-06-30 DIAGNOSIS — I10 BENIGN ESSENTIAL HYPERTENSION: Primary | ICD-10-CM

## 2020-06-30 DIAGNOSIS — R79.0 LOW MAGNESIUM LEVEL: ICD-10-CM

## 2020-06-30 PROCEDURE — 99214 OFFICE O/P EST MOD 30 MIN: CPT | Performed by: NURSE PRACTITIONER

## 2020-07-01 LAB
ALBUMIN SERPL-MCNC: 3.9 G/DL (ref 3.5–5.2)
ALBUMIN/CREAT UR: 783 MG/G CREAT (ref 0–29)
ALBUMIN/GLOB SERPL: 0.7 G/DL
ALP SERPL-CCNC: 106 U/L (ref 39–117)
ALT SERPL-CCNC: 31 U/L (ref 1–33)
AST SERPL-CCNC: 40 U/L (ref 1–32)
BASOPHILS # BLD AUTO: 0.08 10*3/MM3 (ref 0–0.2)
BASOPHILS NFR BLD AUTO: 0.9 % (ref 0–1.5)
BILIRUB SERPL-MCNC: 0.5 MG/DL (ref 0.2–1.2)
BUN SERPL-MCNC: 14 MG/DL (ref 8–23)
BUN/CREAT SERPL: 18.9 (ref 7–25)
CALCIUM SERPL-MCNC: 9.3 MG/DL (ref 8.6–10.5)
CHLORIDE SERPL-SCNC: 100 MMOL/L (ref 98–107)
CHOLEST SERPL-MCNC: 110 MG/DL (ref 0–200)
CO2 SERPL-SCNC: 23.5 MMOL/L (ref 22–29)
CREAT SERPL-MCNC: 0.74 MG/DL (ref 0.57–1)
CREAT UR-MCNC: 19.2 MG/DL
EOSINOPHIL # BLD AUTO: 0.51 10*3/MM3 (ref 0–0.4)
EOSINOPHIL NFR BLD AUTO: 5.5 % (ref 0.3–6.2)
ERYTHROCYTE [DISTWIDTH] IN BLOOD BY AUTOMATED COUNT: 14.7 % (ref 12.3–15.4)
GLOBULIN SER CALC-MCNC: 5.3 GM/DL
GLUCOSE SERPL-MCNC: 117 MG/DL (ref 65–99)
HBA1C MFR BLD: 6.3 % (ref 4.8–5.6)
HCT VFR BLD AUTO: 41 % (ref 34–46.6)
HDLC SERPL-MCNC: 37 MG/DL (ref 40–60)
HGB BLD-MCNC: 13.6 G/DL (ref 12–15.9)
IMM GRANULOCYTES # BLD AUTO: 0.02 10*3/MM3 (ref 0–0.05)
IMM GRANULOCYTES NFR BLD AUTO: 0.2 % (ref 0–0.5)
LDLC SERPL CALC-MCNC: 48 MG/DL (ref 0–100)
LYMPHOCYTES # BLD AUTO: 2.61 10*3/MM3 (ref 0.7–3.1)
LYMPHOCYTES NFR BLD AUTO: 28.1 % (ref 19.6–45.3)
MAGNESIUM SERPL-MCNC: 1.8 MG/DL (ref 1.6–2.4)
MCH RBC QN AUTO: 29.8 PG (ref 26.6–33)
MCHC RBC AUTO-ENTMCNC: 33.2 G/DL (ref 31.5–35.7)
MCV RBC AUTO: 89.7 FL (ref 79–97)
MICROALBUMIN UR-MCNC: 150.3 UG/ML
MONOCYTES # BLD AUTO: 0.97 10*3/MM3 (ref 0.1–0.9)
MONOCYTES NFR BLD AUTO: 10.5 % (ref 5–12)
NEUTROPHILS # BLD AUTO: 5.09 10*3/MM3 (ref 1.7–7)
NEUTROPHILS NFR BLD AUTO: 54.8 % (ref 42.7–76)
NRBC BLD AUTO-RTO: 0.1 /100 WBC (ref 0–0.2)
PLATELET # BLD AUTO: 242 10*3/MM3 (ref 140–450)
POTASSIUM SERPL-SCNC: 3.8 MMOL/L (ref 3.5–5.2)
PROT SERPL-MCNC: 9.2 G/DL (ref 6–8.5)
RBC # BLD AUTO: 4.57 10*6/MM3 (ref 3.77–5.28)
SODIUM SERPL-SCNC: 137 MMOL/L (ref 136–145)
TRIGL SERPL-MCNC: 123 MG/DL (ref 0–150)
TSH SERPL DL<=0.005 MIU/L-ACNC: 1.38 UIU/ML (ref 0.27–4.2)
VIT B12 SERPL-MCNC: 402 PG/ML (ref 211–946)
VLDLC SERPL CALC-MCNC: 24.6 MG/DL
WBC # BLD AUTO: 9.28 10*3/MM3 (ref 3.4–10.8)

## 2020-07-03 DIAGNOSIS — J40 BRONCHITIS: ICD-10-CM

## 2020-07-06 RX ORDER — DEXTROMETHORPHAN HYDROBROMIDE AND PROMETHAZINE HYDROCHLORIDE 15; 6.25 MG/5ML; MG/5ML
SYRUP ORAL
Qty: 118 ML | Refills: 0 | Status: SHIPPED | OUTPATIENT
Start: 2020-07-06 | End: 2020-09-22 | Stop reason: SDUPTHER

## 2020-07-08 ENCOUNTER — TELEPHONE (OUTPATIENT)
Dept: FAMILY MEDICINE CLINIC | Facility: CLINIC | Age: 75
End: 2020-07-08

## 2020-07-08 RX ORDER — FLUCONAZOLE 150 MG/1
150 TABLET ORAL DAILY
Qty: 2 TABLET | Refills: 0 | Status: SHIPPED | OUTPATIENT
Start: 2020-07-08 | End: 2021-07-23

## 2020-07-23 RX ORDER — FELODIPINE 10 MG/1
TABLET, EXTENDED RELEASE ORAL
Qty: 90 TABLET | Refills: 0 | Status: SHIPPED | OUTPATIENT
Start: 2020-07-23 | End: 2020-10-16

## 2020-08-19 DIAGNOSIS — R00.2 PALPITATIONS: ICD-10-CM

## 2020-08-19 DIAGNOSIS — M15.2 BOUCHARD NODES (DJD HAND): ICD-10-CM

## 2020-08-19 DIAGNOSIS — Z91.09 ENVIRONMENTAL ALLERGIES: ICD-10-CM

## 2020-08-19 RX ORDER — MELOXICAM 7.5 MG/1
TABLET ORAL
Qty: 90 TABLET | Refills: 0 | Status: SHIPPED | OUTPATIENT
Start: 2020-08-19 | End: 2020-11-23

## 2020-08-19 RX ORDER — METOPROLOL SUCCINATE 100 MG/1
TABLET, EXTENDED RELEASE ORAL
Qty: 90 TABLET | Refills: 0 | Status: SHIPPED | OUTPATIENT
Start: 2020-08-19 | End: 2020-11-30

## 2020-08-19 RX ORDER — MONTELUKAST SODIUM 10 MG/1
TABLET ORAL
Qty: 90 TABLET | Refills: 0 | Status: SHIPPED | OUTPATIENT
Start: 2020-08-19 | End: 2020-11-23

## 2020-09-04 DIAGNOSIS — J40 BRONCHITIS: ICD-10-CM

## 2020-09-04 RX ORDER — DEXTROMETHORPHAN HYDROBROMIDE AND PROMETHAZINE HYDROCHLORIDE 15; 6.25 MG/5ML; MG/5ML
SYRUP ORAL
Qty: 118 ML | Refills: 0 | OUTPATIENT
Start: 2020-09-04

## 2020-09-14 DIAGNOSIS — I10 BENIGN ESSENTIAL HYPERTENSION: ICD-10-CM

## 2020-09-14 RX ORDER — POTASSIUM CHLORIDE 20MEQ/15ML
LIQUID (ML) ORAL
Qty: 480 ML | Refills: 0 | Status: SHIPPED | OUTPATIENT
Start: 2020-09-14 | End: 2020-10-13

## 2020-09-18 DIAGNOSIS — J40 BRONCHITIS: ICD-10-CM

## 2020-09-18 RX ORDER — CLOPIDOGREL BISULFATE 75 MG/1
TABLET ORAL
Qty: 90 TABLET | Refills: 0 | Status: SHIPPED | OUTPATIENT
Start: 2020-09-18 | End: 2020-11-30

## 2020-09-18 RX ORDER — DEXTROMETHORPHAN HYDROBROMIDE AND PROMETHAZINE HYDROCHLORIDE 15; 6.25 MG/5ML; MG/5ML
SYRUP ORAL
Qty: 118 ML | Refills: 0 | OUTPATIENT
Start: 2020-09-18

## 2020-09-18 RX ORDER — LOSARTAN POTASSIUM 100 MG/1
TABLET ORAL
Qty: 90 TABLET | Refills: 0 | Status: SHIPPED | OUTPATIENT
Start: 2020-09-18 | End: 2020-11-30

## 2020-09-22 ENCOUNTER — TRANSCRIBE ORDERS (OUTPATIENT)
Dept: ADMINISTRATIVE | Facility: HOSPITAL | Age: 75
End: 2020-09-22

## 2020-09-22 DIAGNOSIS — Z12.31 SCREENING MAMMOGRAM, ENCOUNTER FOR: Primary | ICD-10-CM

## 2020-09-22 DIAGNOSIS — J40 BRONCHITIS: ICD-10-CM

## 2020-09-22 RX ORDER — DEXTROMETHORPHAN HYDROBROMIDE AND PROMETHAZINE HYDROCHLORIDE 15; 6.25 MG/5ML; MG/5ML
5 SYRUP ORAL 4 TIMES DAILY PRN
Qty: 118 ML | Refills: 0 | Status: SHIPPED | OUTPATIENT
Start: 2020-09-22 | End: 2021-01-04

## 2020-09-22 NOTE — TELEPHONE ENCOUNTER
This was last filled on 12/6/19. Patient says she uses it at night when allergies are flared. She is not sick.

## 2020-10-01 DIAGNOSIS — E78.5 DYSLIPIDEMIA: ICD-10-CM

## 2020-10-01 RX ORDER — ATORVASTATIN CALCIUM 40 MG/1
TABLET, FILM COATED ORAL
Qty: 90 TABLET | Refills: 0 | Status: SHIPPED | OUTPATIENT
Start: 2020-10-01 | End: 2020-12-28

## 2020-10-13 DIAGNOSIS — I10 BENIGN ESSENTIAL HYPERTENSION: ICD-10-CM

## 2020-10-13 RX ORDER — POTASSIUM CHLORIDE 20MEQ/15ML
LIQUID (ML) ORAL
Qty: 480 ML | Refills: 0 | Status: SHIPPED | OUTPATIENT
Start: 2020-10-13 | End: 2021-02-03

## 2020-10-16 RX ORDER — FELODIPINE 10 MG/1
TABLET, EXTENDED RELEASE ORAL
Qty: 90 TABLET | Refills: 0 | Status: SHIPPED | OUTPATIENT
Start: 2020-10-16 | End: 2021-01-15

## 2020-11-23 DIAGNOSIS — M15.2 BOUCHARD NODES (DJD HAND): ICD-10-CM

## 2020-11-23 DIAGNOSIS — Z91.09 ENVIRONMENTAL ALLERGIES: ICD-10-CM

## 2020-11-23 RX ORDER — MELOXICAM 7.5 MG/1
TABLET ORAL
Qty: 90 TABLET | Refills: 0 | Status: SHIPPED | OUTPATIENT
Start: 2020-11-23

## 2020-11-23 RX ORDER — MONTELUKAST SODIUM 10 MG/1
TABLET ORAL
Qty: 90 TABLET | Refills: 0 | Status: SHIPPED | OUTPATIENT
Start: 2020-11-23 | End: 2021-02-19

## 2020-11-30 DIAGNOSIS — R00.2 PALPITATIONS: ICD-10-CM

## 2020-11-30 RX ORDER — LOSARTAN POTASSIUM 100 MG/1
TABLET ORAL
Qty: 90 TABLET | Refills: 0 | Status: SHIPPED | OUTPATIENT
Start: 2020-11-30 | End: 2021-03-05

## 2020-11-30 RX ORDER — METOPROLOL SUCCINATE 100 MG/1
TABLET, EXTENDED RELEASE ORAL
Qty: 90 TABLET | Refills: 0 | Status: SHIPPED | OUTPATIENT
Start: 2020-11-30 | End: 2021-03-08

## 2020-11-30 RX ORDER — CLOPIDOGREL BISULFATE 75 MG/1
TABLET ORAL
Qty: 90 TABLET | Refills: 0 | Status: SHIPPED | OUTPATIENT
Start: 2020-11-30 | End: 2021-03-05

## 2020-12-16 ENCOUNTER — HOSPITAL ENCOUNTER (OUTPATIENT)
Dept: MAMMOGRAPHY | Facility: HOSPITAL | Age: 75
Discharge: HOME | End: 2020-12-16
Admitting: NURSE PRACTITIONER

## 2020-12-16 DIAGNOSIS — Z12.31 SCREENING MAMMOGRAM, ENCOUNTER FOR: ICD-10-CM

## 2020-12-16 PROCEDURE — 77063 BREAST TOMOSYNTHESIS BI: CPT

## 2020-12-16 PROCEDURE — 77067 SCR MAMMO BI INCL CAD: CPT

## 2020-12-18 DIAGNOSIS — R92.8 ABNORMAL MAMMOGRAM OF LEFT BREAST: Primary | ICD-10-CM

## 2020-12-27 DIAGNOSIS — E78.5 DYSLIPIDEMIA: ICD-10-CM

## 2020-12-28 RX ORDER — ATORVASTATIN CALCIUM 40 MG/1
TABLET, FILM COATED ORAL
Qty: 90 TABLET | Refills: 0 | Status: SHIPPED | OUTPATIENT
Start: 2020-12-28 | End: 2021-04-12

## 2021-01-04 ENCOUNTER — OFFICE VISIT (OUTPATIENT)
Dept: FAMILY MEDICINE CLINIC | Facility: CLINIC | Age: 76
End: 2021-01-04

## 2021-01-04 VITALS
SYSTOLIC BLOOD PRESSURE: 160 MMHG | OXYGEN SATURATION: 97 % | HEIGHT: 65 IN | BODY MASS INDEX: 25.33 KG/M2 | DIASTOLIC BLOOD PRESSURE: 80 MMHG | TEMPERATURE: 97.3 F | WEIGHT: 152 LBS | HEART RATE: 68 BPM

## 2021-01-04 DIAGNOSIS — I10 BENIGN ESSENTIAL HYPERTENSION: ICD-10-CM

## 2021-01-04 DIAGNOSIS — M15.2 BOUCHARD NODES (DJD HAND): ICD-10-CM

## 2021-01-04 DIAGNOSIS — Z71.85 IMMUNIZATION COUNSELING: ICD-10-CM

## 2021-01-04 DIAGNOSIS — E11.29 TYPE 2 DIABETES MELLITUS WITH MICROALBUMINURIA, WITHOUT LONG-TERM CURRENT USE OF INSULIN (HCC): Primary | ICD-10-CM

## 2021-01-04 DIAGNOSIS — R80.9 TYPE 2 DIABETES MELLITUS WITH MICROALBUMINURIA, WITHOUT LONG-TERM CURRENT USE OF INSULIN (HCC): Primary | ICD-10-CM

## 2021-01-04 DIAGNOSIS — Z00.00 MEDICARE ANNUAL WELLNESS VISIT, SUBSEQUENT: ICD-10-CM

## 2021-01-04 PROCEDURE — 96160 PT-FOCUSED HLTH RISK ASSMT: CPT | Performed by: NURSE PRACTITIONER

## 2021-01-04 PROCEDURE — 1160F RVW MEDS BY RX/DR IN RCRD: CPT | Performed by: NURSE PRACTITIONER

## 2021-01-04 PROCEDURE — 1170F FXNL STATUS ASSESSED: CPT | Performed by: NURSE PRACTITIONER

## 2021-01-04 PROCEDURE — 99214 OFFICE O/P EST MOD 30 MIN: CPT | Performed by: NURSE PRACTITIONER

## 2021-01-04 PROCEDURE — G0439 PPPS, SUBSEQ VISIT: HCPCS | Performed by: NURSE PRACTITIONER

## 2021-01-04 RX ORDER — DEXTROMETHORPHAN HYDROBROMIDE AND PROMETHAZINE HYDROCHLORIDE 15; 6.25 MG/5ML; MG/5ML
2.5 SYRUP ORAL 4 TIMES DAILY PRN
Qty: 118 ML | Refills: 0 | Status: SHIPPED | OUTPATIENT
Start: 2021-01-04 | End: 2021-02-19

## 2021-01-04 NOTE — PROGRESS NOTES
The ABCs of the Annual Wellness Visit  Subsequent Medicare Wellness Visit    Chief Complaint   Patient presents with   • Annual Exam     medicare   • Hypertension   • Hyperlipidemia   • Diabetes   • Back Pain       Subjective   History of Present Illness:  Radha Acosta is a 75 y.o. female who presents for a Subsequent Medicare Wellness Visit.    HEALTH RISK ASSESSMENT    Recent Hospitalizations:  No hospitalization(s) within the last year.    Current Medical Providers:  Patient Care Team:  Beatriz Bernal APRN as PCP - General  Beatriz Bernal APRN as PCP - Family Medicine    Smoking Status:  Social History     Tobacco Use   Smoking Status Never Smoker       Alcohol Consumption:  Social History     Substance and Sexual Activity   Alcohol Use None       Depression Screen:   PHQ-2/PHQ-9 Depression Screening 1/4/2021   Little interest or pleasure in doing things 0   Feeling down, depressed, or hopeless 0   Trouble falling or staying asleep, or sleeping too much 0   Feeling tired or having little energy 0   Poor appetite or overeating 0   Feeling bad about yourself - or that you are a failure or have let yourself or your family down 0   Trouble concentrating on things, such as reading the newspaper or watching television 0   Moving or speaking so slowly that other people could have noticed. Or the opposite - being so fidgety or restless that you have been moving around a lot more than usual 0   Thoughts that you would be better off dead, or of hurting yourself in some way 0   Total Score 0   If you checked off any problems, how difficult have these problems made it for you to do your work, take care of things at home, or get along with other people? Not difficult at all       Fall Risk Screen:  STEADI Fall Risk Assessment was completed, and patient is at LOW risk for falls.Assessment completed on:1/4/2021    Health Habits and Functional and Cognitive Screening:  Functional & Cognitive Status 1/4/2021    Do you have difficulty preparing food and eating? No   Do you have difficulty bathing yourself, getting dressed or grooming yourself? No   Do you have difficulty using the toilet? No   Do you have difficulty moving around from place to place? No   Do you have trouble with steps or getting out of a bed or a chair? No   Current Diet Well Balanced Diet   Dental Exam Not up to date   Eye Exam Not up to date   Exercise (times per week) 0 times per week   Current Exercises Include No Regular Exercise   Current Exercise Activities Include -   Do you need help using the phone?  No   Are you deaf or do you have serious difficulty hearing?  No   Do you need help with transportation? No   Do you need help shopping? No   Do you need help preparing meals?  No   Do you need help with housework?  No   Do you need help with laundry? No   Do you need help taking your medications? No   Do you need help managing money? No   Do you ever drive or ride in a car without wearing a seat belt? No   Have you felt unusual stress, anger or loneliness in the last month? No   Who do you live with? Alone   If you need help, do you have trouble finding someone available to you? No   Have you been bothered in the last four weeks by sexual problems? No   Do you have difficulty concentrating, remembering or making decisions? Yes     Still exercising at home with sister. No longer going to gym secondary to pandemic.     Does the patient have evidence of cognitive impairment? Yes clock abnormal, but denies issues with tasks or cognition.     Asprin use counseling:Taking ASA appropriately as indicated    Age-appropriate Screening Schedule:  Refer to the list below for future screening recommendations based on patient's age, sex and/or medical conditions. Orders for these recommended tests are listed in the plan section. The patient has been provided with a written plan.    Health Maintenance   Topic Date Due   • TDAP/TD VACCINES (1 - Tdap) 10/07/1966    • DIABETIC EYE EXAM  12/05/2018   • INFLUENZA VACCINE  08/01/2020   • DIABETIC FOOT EXAM  09/23/2020   • HEMOGLOBIN A1C  12/30/2020   • LIPID PANEL  06/30/2021   • URINE MICROALBUMIN  06/30/2021   • MAMMOGRAM  12/16/2022   • COLONOSCOPY  09/20/2026          The following portions of the patient's history were reviewed and updated as appropriate: allergies, current medications, past family history, past medical history, past social history, past surgical history and problem list.    Outpatient Medications Prior to Visit   Medication Sig Dispense Refill   • aspirin 81 MG chewable tablet Chew daily.     • atorvastatin (LIPITOR) 40 MG tablet TAKE 1 TABLET BY MOUTH ONCE DAILY AT NIGHT 90 tablet 0   • Cetirizine HCl 10 MG capsule Take  by mouth.     • clopidogrel (PLAVIX) 75 MG tablet Take 1 tablet by mouth once daily 90 tablet 0   • felodipine (PLENDIL) 10 MG 24 hr tablet Take 1 tablet by mouth once daily 90 tablet 0   • fluconazole (Diflucan) 150 MG tablet Take 1 tablet by mouth Daily. 2 tablet 0   • losartan (COZAAR) 100 MG tablet Take 1 tablet by mouth once daily 90 tablet 0   • magnesium oxide (MAGOX) 400 (241.3 Mg) MG tablet tablet Take 1 tablet by mouth once daily 30 tablet 0   • meloxicam (MOBIC) 7.5 MG tablet Take 1 tablet by mouth once daily 90 tablet 0   • metFORMIN (GLUCOPHAGE) 500 MG tablet TAKE 1 TABLET BY MOUTH TWICE DAILY WITH MEALS 180 tablet 0   • metoprolol succinate XL (TOPROL-XL) 100 MG 24 hr tablet Take 1 tablet by mouth once daily 90 tablet 0   • montelukast (SINGULAIR) 10 MG tablet TAKE 1 TABLET BY MOUTH ONCE DAILY AT NIGHT 90 tablet 0   • potassium chloride (KAYCIEL) 20 MEQ/15ML (10%) solution TAKE 15 ML BY MOUTH  ONCE DAILY 480 mL 0   • promethazine-dextromethorphan (PROMETHAZINE-DM) 6.25-15 MG/5ML syrup Take 5 mL by mouth 4 (Four) Times a Day As Needed for Cough. 118 mL 0   • selenium sulfide (SELSUN) 2.5 % shampoo Apply  topically Daily As Needed for Irritation or Itching. 118 mL 0   •  triamcinolone (KENALOG) 0.1 % cream APPLY ONE APPLICATION TOPICALLY TWICE DAILY 45 g 1     No facility-administered medications prior to visit.        Patient Active Problem List   Diagnosis   • Atopic rhinitis   • Acute otitis media   • Bell's palsy   • Benign essential hypertension   • Cerebral atherosclerosis   • Type 2 diabetes mellitus with microalbuminuria, without long-term current use of insulin (CMS/[facility])   • Familial hypertriglyceridemia   • Low back pain   • Palpitations   • Saul nodes (DJD hand)       Advanced Care Planning:  ACP discussion was held with the patient during this visit. Patient does not have an advance directive, information provided.    Review of Systems   Constitutional: Negative.  Negative for activity change and unexpected weight change.   HENT: Negative.    Eyes: Negative.  Negative for visual disturbance.   Respiratory: Negative.    Cardiovascular: Negative.  Negative for chest pain.   Gastrointestinal: Negative.  Negative for constipation and diarrhea.   Endocrine: Negative.    Genitourinary: Positive for urgency (occasional). Negative for difficulty urinating and frequency.   Musculoskeletal: Negative.  Negative for gait problem. Arthralgias: hand arthritis is improved.   Skin: Negative.         Chronic alopecia--wears wig   Allergic/Immunologic: Negative.    Neurological: Negative for seizures, weakness and headaches.   Hematological: Negative.    Psychiatric/Behavioral: Negative.  Negative for confusion, decreased concentration and dysphoric mood. The patient is not nervous/anxious.        Compared to one year ago, the patient feels her physical health is the same.  Compared to one year ago, the patient feels her mental health is the same.    Reviewed chart for potential of high risk medication in the elderly: yes  Reviewed chart for potential of harmful drug interactions in the elderly:yes    Objective         Vitals:    01/04/21 1124 01/04/21 1151   BP: 156/82 160/80  "  Pulse: 68    Temp: 97.3 °F (36.3 °C)    TempSrc: Infrared    SpO2: 97%    Weight: 68.9 kg (152 lb)    Height: 165.1 cm (65\")        Body mass index is 25.29 kg/m².  Discussed the patient's BMI with her. The BMI is in the acceptable range.    Physical Exam  Vitals signs and nursing note reviewed.   Constitutional:       Appearance: Normal appearance. She is well-developed. She is not diaphoretic.   HENT:      Head: Normocephalic and atraumatic.   Neck:      Musculoskeletal: Normal range of motion and neck supple.      Thyroid: No thyromegaly.   Cardiovascular:      Rate and Rhythm: Normal rate and regular rhythm.      Pulses:           Carotid pulses are 2+ on the right side and 2+ on the left side.     Heart sounds: Normal heart sounds.   Pulmonary:      Effort: Pulmonary effort is normal.      Breath sounds: Normal breath sounds.   Feet:      Comments: Declined secondary to stockings today  Lymphadenopathy:      Cervical: No cervical adenopathy.   Neurological:      Mental Status: She is alert.   Psychiatric:         Behavior: Behavior normal.         Thought Content: Thought content normal.         Judgment: Judgment normal.             Assessment/Plan   Medicare Risks and Personalized Health Plan  CMS Preventative Services Quick Reference  Advance Directive Discussion  Cardiovascular risk  Immunizations Discussed/Encouraged (specific immunizations; COVID )  Polypharmacy    The above risks/problems have been discussed with the patient.  Pertinent information has been shared with the patient in the After Visit Summary.  Follow up plans and orders are seen below in the Assessment/Plan Section.    Diagnoses and all orders for this visit:    1. Type 2 diabetes mellitus with microalbuminuria, without long-term current use of insulin (CMS/[facility]) (Primary)  -     Comprehensive Metabolic Panel  -     Hemoglobin A1c    2. Benign essential hypertension    3. Immunization counseling    4. Saul nodes (DJD hand)    5. " Medicare annual wellness visit, subsequent      Follow Up:  No follow-ups on file.     An After Visit Summary and PPPS were given to the patient.       DM2--historically well controlled. Update labs.   HTN--history of TIA--reports was rushed today. 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   Declines pneumovax, but will get COVID vaccine once available  DJD--will stop mobic as less symptoms of late

## 2021-01-05 LAB
ALBUMIN SERPL-MCNC: 4 G/DL (ref 3.7–4.7)
ALBUMIN/GLOB SERPL: 0.9 {RATIO} (ref 1.2–2.2)
ALP SERPL-CCNC: 90 IU/L (ref 39–117)
ALT SERPL-CCNC: 28 IU/L (ref 0–32)
AST SERPL-CCNC: 34 IU/L (ref 0–40)
BILIRUB SERPL-MCNC: 0.6 MG/DL (ref 0–1.2)
BUN SERPL-MCNC: 13 MG/DL (ref 8–27)
BUN/CREAT SERPL: 12 (ref 12–28)
CALCIUM SERPL-MCNC: 9.3 MG/DL (ref 8.7–10.3)
CHLORIDE SERPL-SCNC: 102 MMOL/L (ref 96–106)
CO2 SERPL-SCNC: 24 MMOL/L (ref 20–29)
CREAT SERPL-MCNC: 1.11 MG/DL (ref 0.57–1)
GLOBULIN SER CALC-MCNC: 4.4 G/DL (ref 1.5–4.5)
GLUCOSE SERPL-MCNC: 126 MG/DL (ref 65–99)
HBA1C MFR BLD: 6 % (ref 4.8–5.6)
POTASSIUM SERPL-SCNC: 4.3 MMOL/L (ref 3.5–5.2)
PROT SERPL-MCNC: 8.4 G/DL (ref 6–8.5)
SODIUM SERPL-SCNC: 142 MMOL/L (ref 134–144)

## 2021-01-07 NOTE — PROGRESS NOTES
Please call the patient regarding her abnormal result. Stop meloxicam as discussed as kidney function mildly decreased. A1c at 6, looks good. FU 6 months

## 2021-01-14 ENCOUNTER — HOSPITAL ENCOUNTER (OUTPATIENT)
Dept: MAMMOGRAPHY | Facility: HOSPITAL | Age: 76
Discharge: HOME | End: 2021-01-14
Admitting: NURSE PRACTITIONER

## 2021-01-14 ENCOUNTER — APPOINTMENT (OUTPATIENT)
Dept: ULTRASOUND IMAGING | Facility: HOSPITAL | Age: 76
End: 2021-01-14

## 2021-01-14 DIAGNOSIS — R92.8 ABNORMAL MAMMOGRAM OF LEFT BREAST: ICD-10-CM

## 2021-01-14 PROCEDURE — 77065 DX MAMMO INCL CAD UNI: CPT

## 2021-01-14 NOTE — PROGRESS NOTES
Please call the patient regarding her calcification in breast. Not highly suspicious but biopsy is recommended

## 2021-01-15 RX ORDER — FELODIPINE 10 MG/1
TABLET, EXTENDED RELEASE ORAL
Qty: 90 TABLET | Refills: 0 | Status: SHIPPED | OUTPATIENT
Start: 2021-01-15 | End: 2021-04-16

## 2021-01-17 DIAGNOSIS — R92.8 ABNORMAL MAMMOGRAM OF LEFT BREAST: Primary | ICD-10-CM

## 2021-01-19 ENCOUNTER — TELEPHONE (OUTPATIENT)
Dept: FAMILY MEDICINE CLINIC | Facility: CLINIC | Age: 76
End: 2021-01-19

## 2021-01-19 NOTE — TELEPHONE ENCOUNTER
Has breast biospy at [facility] on 2/2/21 and needs to know when to stop plavix and asa? Please advise.

## 2021-01-20 NOTE — TELEPHONE ENCOUNTER
[facility] mamography needs permission to tell patient to stay off of plavix for days 7 and aspirin for 5 prior to breast biopsy that is scheduled for 2-1-21. Please advise.      Isabel with Mammography [phone]

## 2021-02-01 ENCOUNTER — HOSPITAL ENCOUNTER (OUTPATIENT)
Dept: MAMMOGRAPHY | Facility: HOSPITAL | Age: 76
Discharge: HOME | End: 2021-02-01
Admitting: NURSE PRACTITIONER

## 2021-02-01 VITALS
HEIGHT: 65 IN | OXYGEN SATURATION: 98 % | BODY MASS INDEX: 25.31 KG/M2 | HEART RATE: 72 BPM | WEIGHT: 151.9 LBS | TEMPERATURE: 97.8 F | RESPIRATION RATE: 18 BRPM | DIASTOLIC BLOOD PRESSURE: 70 MMHG | SYSTOLIC BLOOD PRESSURE: 170 MMHG

## 2021-02-01 DIAGNOSIS — R92.8 ABNORMAL MAMMOGRAM OF LEFT BREAST: ICD-10-CM

## 2021-02-01 PROCEDURE — 88305 TISSUE EXAM BY PATHOLOGIST: CPT | Performed by: NURSE PRACTITIONER

## 2021-02-01 PROCEDURE — 25010000003 LIDOCAINE 1 % SOLUTION: Performed by: NURSE PRACTITIONER

## 2021-02-01 RX ORDER — LIDOCAINE HYDROCHLORIDE 10 MG/ML
1 INJECTION, SOLUTION INFILTRATION; PERINEURAL ONCE
Status: COMPLETED | OUTPATIENT
Start: 2021-02-01 | End: 2021-02-01

## 2021-02-01 RX ADMIN — LIDOCAINE HYDROCHLORIDE 15 ML: 10; .005 INJECTION, SOLUTION EPIDURAL; INFILTRATION; INTRACAUDAL; PERINEURAL at 12:48

## 2021-02-01 RX ADMIN — LIDOCAINE HYDROCHLORIDE 1 ML: 10 INJECTION, SOLUTION INFILTRATION; PERINEURAL at 12:48

## 2021-02-01 NOTE — NURSING NOTE
Biopsy site to left lateral breast clear with Dermabond dry and intact. No firmness or swelling noted at or around biopsy site. Denies pain. Ice pack with protective covering applied to biopsy site. Discharge instructions discussed with understanding voiced by patient. Copies provided to patient. No distress noted. To home via private vehicle.

## 2021-02-01 NOTE — H&P
Name: Radha Acosta ADMIT: 2021   : 1945  PCP: Beatriz Bernal APRN    MRN: [id number] LOS: 0 days   AGE/SEX: 75 y.o. female  ROOM: Room/bed info not found       Chief complaint left breast calcifications    Present Illness or Internal History:  Patient is a 75 y.o. female presents with left breast calcifications.     Past Surgical History:  Past Surgical History:   Procedure Laterality Date   • APPENDECTOMY     • BREAST BIOPSY Left     x2 (Benign x2)   • CHOLECYSTECTOMY OPEN         Past Medical History:  Past Medical History:   Diagnosis Date   • Allergic rhinitis    • Bell's palsy    • Cerebral atherosclerosis    • Chronic sinusitis    • Diabetes mellitus (CMS/HCC)    • Familial hypertriglyceridemia    • GERD (gastroesophageal reflux disease)    • Hypertension    • Low back pain    • Osteoarthritis of hands, bilateral    • Palpitations        Home Medications:  (Not in a hospital admission)      Allergies:  Beta adrenergic blockers, Codeine, Latex, and Lisinopril-hydrochlorothiazide    Family History:  Family History   Problem Relation Age of Onset   • Diabetes Mother    • Leukemia Daughter        Social History:  Social History     Tobacco Use   • Smoking status: Never Smoker   Substance Use Topics   • Alcohol use: Not on file   • Drug use: Not on file        Objective     Physical Exam:    No exam performed today,    Vital Signs  Temp:  [97.8 °F (36.6 °C)] 97.8 °F (36.6 °C)  Heart Rate:  [62] 62  Resp:  [18] 18  BP: (218)/(86) 218/86    Anticipated Surgical Procedure:  Stereotactic guided vacuum assisted left breast biopsy with clip placement    The risks, benefits and alternatives of this procedure have been discussed with the patient or responsible party: Yes        Vern Wilder Jr., MD  21  12:22 EST

## 2021-02-02 LAB
LAB AP CASE REPORT: NORMAL
PATH REPORT.FINAL DX SPEC: NORMAL
PATH REPORT.GROSS SPEC: NORMAL

## 2021-02-03 DIAGNOSIS — I10 BENIGN ESSENTIAL HYPERTENSION: ICD-10-CM

## 2021-02-03 RX ORDER — POTASSIUM CHLORIDE 20MEQ/15ML
LIQUID (ML) ORAL
Qty: 480 ML | Refills: 0 | Status: SHIPPED | OUTPATIENT
Start: 2021-02-03 | End: 2021-04-06

## 2021-02-03 NOTE — PROGRESS NOTES
Please call the patient regarding her results--breast biopsy appears fibroadenoma, not cancer, these lesions can still be suspicious. Refer to Dr. Licona, breast surgeon to recommend further management--excision vs. Following with imaging

## 2021-02-04 DIAGNOSIS — D24.2 FIBROADENOMA OF LEFT BREAST: Primary | ICD-10-CM

## 2021-02-19 DIAGNOSIS — Z91.09 ENVIRONMENTAL ALLERGIES: ICD-10-CM

## 2021-02-19 RX ORDER — DEXTROMETHORPHAN HYDROBROMIDE AND PROMETHAZINE HYDROCHLORIDE 15; 6.25 MG/5ML; MG/5ML
SYRUP ORAL
Qty: 118 ML | Refills: 0 | Status: SHIPPED | OUTPATIENT
Start: 2021-02-19 | End: 2021-04-12

## 2021-02-19 RX ORDER — MONTELUKAST SODIUM 10 MG/1
TABLET ORAL
Qty: 90 TABLET | Refills: 0 | Status: SHIPPED | OUTPATIENT
Start: 2021-02-19 | End: 2021-06-04

## 2021-03-05 ENCOUNTER — OFFICE VISIT (OUTPATIENT)
Dept: MAMMOGRAPHY | Facility: CLINIC | Age: 76
End: 2021-03-05

## 2021-03-05 VITALS
DIASTOLIC BLOOD PRESSURE: 62 MMHG | WEIGHT: 151 LBS | BODY MASS INDEX: 25.16 KG/M2 | HEIGHT: 65 IN | SYSTOLIC BLOOD PRESSURE: 122 MMHG

## 2021-03-05 DIAGNOSIS — D24.2 FIBROADENOMA, LEFT: Primary | ICD-10-CM

## 2021-03-05 PROCEDURE — 99203 OFFICE O/P NEW LOW 30 MIN: CPT | Performed by: SURGERY

## 2021-03-05 RX ORDER — LOSARTAN POTASSIUM 100 MG/1
TABLET ORAL
Qty: 90 TABLET | Refills: 1 | Status: SHIPPED | OUTPATIENT
Start: 2021-03-05 | End: 2021-09-22

## 2021-03-05 RX ORDER — CLOPIDOGREL BISULFATE 75 MG/1
TABLET ORAL
Qty: 90 TABLET | Refills: 1 | Status: SHIPPED | OUTPATIENT
Start: 2021-03-05 | End: 2021-09-03

## 2021-03-05 NOTE — PROGRESS NOTES
Chief Complaint: Radha Acosta is a 75 y.o.. female here today for Abnormal Breast Imaging (Left fibroadenoma)        History of Present Illness:  Patient presents with abnormal breast imaging. Left fibroadenoma.   She is a very nice 75-year-old black female who recently had mammograms which describes some pleomorphic calcifications in the upper outer quadrant left breast in the posterior one third.  They also mention some stable architectural distortion in the retroareolar region of the right breast which is stable dating back to 2012.  The patient was not having any nipple discharge and had not palpated a mass.  I personally reviewed the imaging studies and the calcifications were definitely clustered and pleomorphic.  I agree with the recommendation for biopsy.  That procedure has revealed fibroadenomatoid change with calcifications.  I also reviewed the post biopsy pictures and it looks like all the calcium was removed.    Her family history is negative for breast or ovarian cancer.    Review of Systems:  Review of Systems   Skin:        The patient denies any noticeable changes to the skin of the breast.    All other systems reviewed and are negative.     I have reviewed the ROS as documented by the MA/LPN/RN Praful Licona MD      Past Medical and Surgical History:  Breast Biopsy History:  Patient has had the following breast biopsies:2/2021 Left-benign  Breast Cancer HIstory:  Patient does not have a past medical history of breast cancer.  Breast Operations, and year:  None  Social History     Tobacco Use   Smoking Status Never Smoker     Obstetric History:  Patient is postmenopausal, entered menopause naturally at age: 52   Number of pregnancies:6  Number of live births: 6  Number of abortions or miscarriages: 0  Age of delivery of first child: 20  Patient breast fed, for the following lenth of time: Unknown  Length of time taking birth control pills:n/a  Patient has never taken hormone  replacement    Past Surgical History:   Procedure Laterality Date   • APPENDECTOMY     • BREAST BIOPSY Left     x2 (Benign x2)   • CHOLECYSTECTOMY OPEN         Past Medical History:   Diagnosis Date   • Allergic rhinitis    • Bell's palsy    • Cerebral atherosclerosis    • Chronic sinusitis    • Diabetes mellitus (CMS/HCC)    • Familial hypertriglyceridemia    • GERD (gastroesophageal reflux disease)    • Hypertension    • Low back pain    • Osteoarthritis of hands, bilateral    • Palpitations        Prior Hospitalizations, other than for surgery or childbirth, and year:  None    Social History:  Patient is single.  Patient has three daughters. and Patient has three sons.    Family History:  Family History   Problem Relation Age of Onset   • Diabetes Mother    • Leukemia Daughter        Vital Signs:  Vitals:    03/05/21 1303   BP: 122/62       Medications:    Current Outpatient Prescriptions:     Current Outpatient Medications:   •  aspirin 81 MG chewable tablet, Chew daily., Disp: , Rfl:   •  atorvastatin (LIPITOR) 40 MG tablet, TAKE 1 TABLET BY MOUTH ONCE DAILY AT NIGHT, Disp: 90 tablet, Rfl: 0  •  Cetirizine HCl 10 MG capsule, Take  by mouth., Disp: , Rfl:   •  clopidogrel (PLAVIX) 75 MG tablet, Take 1 tablet by mouth once daily, Disp: 90 tablet, Rfl: 1  •  felodipine (PLENDIL) 10 MG 24 hr tablet, Take 1 tablet by mouth once daily, Disp: 90 tablet, Rfl: 0  •  fluconazole (Diflucan) 150 MG tablet, Take 1 tablet by mouth Daily., Disp: 2 tablet, Rfl: 0  •  losartan (COZAAR) 100 MG tablet, Take 1 tablet by mouth once daily, Disp: 90 tablet, Rfl: 1  •  magnesium oxide (MAGOX) 400 (241.3 Mg) MG tablet tablet, Take 1 tablet by mouth once daily, Disp: 30 tablet, Rfl: 0  •  meloxicam (MOBIC) 7.5 MG tablet, Take 1 tablet by mouth once daily, Disp: 90 tablet, Rfl: 0  •  metFORMIN (GLUCOPHAGE) 500 MG tablet, TAKE 1 TABLET BY MOUTH TWICE DAILY WITH MEALS, Disp: 180 tablet, Rfl: 0  •  metoprolol succinate XL (TOPROL-XL) 100 MG  24 hr tablet, Take 1 tablet by mouth once daily, Disp: 90 tablet, Rfl: 0  •  montelukast (SINGULAIR) 10 MG tablet, TAKE 1 TABLET BY MOUTH ONCE DAILY AT NIGHT, Disp: 90 tablet, Rfl: 0  •  potassium chloride (KAYCIEL) 20 MEQ/15ML (10%) solution, TAKE 15 ML BY MOUTH  ONCE DAILY, Disp: 480 mL, Rfl: 0  •  promethazine-dextromethorphan (PROMETHAZINE-DM) 6.25-15 MG/5ML syrup, TAKE 2 & 1/2 (TWO & ONE-HALF) ML BY MOUTH  4 TIMES DAILY AS NEEDED FOR COUGH, Disp: 118 mL, Rfl: 0  •  selenium sulfide (SELSUN) 2.5 % shampoo, Apply  topically Daily As Needed for Irritation or Itching., Disp: 118 mL, Rfl: 0  •  triamcinolone (KENALOG) 0.1 % cream, APPLY ONE APPLICATION TOPICALLY TWICE DAILY, Disp: 45 g, Rfl: 1    Physical Examination:  General Appearance:   Patient is in no distress.  She is well kept and has an average build.   Psychiatric:  Patient with appropriate mood and affect. Alert and oriented to self, time, and place.    Breast, RIGHT:  medium sized, symmetric with the contralateral side.  Breast skin is without erythema, edema, rashes.  There are no visible abnormalities upon inspection during the arm-raising maneuver or with hands on hips in the sitting position. There is no nipple retraction, discharge or nipple/areolar skin changes.There are no masses palpable in the sitting or supine positions.    Breast, LEFT:  medium sized, symmetric with the contralateral side.  Breast skin is without erythema, edema, rashes.  There are no visible abnormalities upon inspection during the arm-raising maneuver or with hands on hips in the sitting position. There is no nipple retraction, discharge or nipple/areolar skin changes.There are no masses palpable in the sitting or supine positions.  She has a small biopsy scar in the upper outer quadrant and slightly inferior to this is some ill-defined change related to the biopsy.    Lymphatic:  There is no axillary, cervical, infraclavicular, or supraclavicular adenopathy  bilaterally.  Eyes:  Pupils are round and reactive to light.  Cardiovascular:  Heart rate and rhythm are regular.  Respiratory:  Lungs are clear bilaterally with no crackles or wheezes in any lung field.  Gastrointestinal:  Abdomen is soft, nondistended, and nontender.  There was no obvious hepatosplenomegaly or abdominal mass.  There are  scars from previous surgery.    Musculoskeletal:  Good strength in all 4 extremities.   There is good range of motion in both shoulders.    Skin:  No new skin lesions or rashes on the skin excluding the breast (see breast exam above).    Assessment:  1. Fibroadenoma, left          Plan:  We had a nice discussion regarding microcalcifications and their potential association with a very early breast cancer.  Fortunately the calcifications involved and removed and the path report is benign.  The radiologist have also commented that the pathology and imaging are concordant.  A return to routine screening was recommended.  I would agree with these recommendations and I would be happy to see her again if needed.      CPT coding:    Next Appointment:  No follow-ups on file.            EMR Dragon/transcription disclaimer:    Much of this encounter note is an electronic transcription/translocation of spoken language to printed text.  The electronic translation of spoken language may permit erroneous, or at times, nonsensical words or phrases to be inadvertently transcribed.  Although I have reviewed the note from such areas, some may still exist.

## 2021-03-08 DIAGNOSIS — R00.2 PALPITATIONS: ICD-10-CM

## 2021-03-08 RX ORDER — METOPROLOL SUCCINATE 100 MG/1
TABLET, EXTENDED RELEASE ORAL
Qty: 90 TABLET | Refills: 0 | Status: SHIPPED | OUTPATIENT
Start: 2021-03-08 | End: 2021-06-03

## 2021-04-06 DIAGNOSIS — I10 BENIGN ESSENTIAL HYPERTENSION: ICD-10-CM

## 2021-04-06 RX ORDER — POTASSIUM CHLORIDE 20MEQ/15ML
LIQUID (ML) ORAL
Qty: 480 ML | Refills: 0 | Status: SHIPPED | OUTPATIENT
Start: 2021-04-06 | End: 2021-05-24

## 2021-04-11 DIAGNOSIS — E78.5 DYSLIPIDEMIA: ICD-10-CM

## 2021-04-12 RX ORDER — ATORVASTATIN CALCIUM 40 MG/1
TABLET, FILM COATED ORAL
Qty: 90 TABLET | Refills: 0 | Status: SHIPPED | OUTPATIENT
Start: 2021-04-12 | End: 2021-07-09

## 2021-04-12 RX ORDER — DEXTROMETHORPHAN HYDROBROMIDE AND PROMETHAZINE HYDROCHLORIDE 15; 6.25 MG/5ML; MG/5ML
SYRUP ORAL
Qty: 118 ML | Refills: 0 | Status: SHIPPED | OUTPATIENT
Start: 2021-04-12 | End: 2021-06-18

## 2021-04-16 RX ORDER — FELODIPINE 10 MG/1
TABLET, EXTENDED RELEASE ORAL
Qty: 90 TABLET | Refills: 0 | Status: SHIPPED | OUTPATIENT
Start: 2021-04-16 | End: 2021-07-26

## 2021-05-24 DIAGNOSIS — I10 BENIGN ESSENTIAL HYPERTENSION: ICD-10-CM

## 2021-05-24 RX ORDER — POTASSIUM CHLORIDE 20MEQ/15ML
LIQUID (ML) ORAL
Qty: 480 ML | Refills: 0 | Status: SHIPPED | OUTPATIENT
Start: 2021-05-24 | End: 2021-05-28

## 2021-05-27 DIAGNOSIS — I10 BENIGN ESSENTIAL HYPERTENSION: ICD-10-CM

## 2021-05-28 RX ORDER — POTASSIUM CHLORIDE 20MEQ/15ML
LIQUID (ML) ORAL
Qty: 7 ML | Refills: 0 | Status: SHIPPED | OUTPATIENT
Start: 2021-05-28 | End: 2021-07-07 | Stop reason: SDUPTHER

## 2021-06-03 DIAGNOSIS — R00.2 PALPITATIONS: ICD-10-CM

## 2021-06-03 RX ORDER — METOPROLOL SUCCINATE 100 MG/1
TABLET, EXTENDED RELEASE ORAL
Qty: 90 TABLET | Refills: 0 | Status: SHIPPED | OUTPATIENT
Start: 2021-06-03 | End: 2021-08-30

## 2021-06-04 DIAGNOSIS — Z91.09 ENVIRONMENTAL ALLERGIES: ICD-10-CM

## 2021-06-04 RX ORDER — MONTELUKAST SODIUM 10 MG/1
TABLET ORAL
Qty: 90 TABLET | Refills: 0 | Status: SHIPPED | OUTPATIENT
Start: 2021-06-04 | End: 2021-08-30

## 2021-06-18 RX ORDER — DEXTROMETHORPHAN HYDROBROMIDE AND PROMETHAZINE HYDROCHLORIDE 15; 6.25 MG/5ML; MG/5ML
SYRUP ORAL
Qty: 118 ML | Refills: 0 | Status: SHIPPED | OUTPATIENT
Start: 2021-06-18 | End: 2021-07-23

## 2021-07-07 DIAGNOSIS — I10 BENIGN ESSENTIAL HYPERTENSION: ICD-10-CM

## 2021-07-07 RX ORDER — POTASSIUM CHLORIDE 20MEQ/15ML
20 LIQUID (ML) ORAL DAILY
Qty: 450 ML | Refills: 0 | Status: SHIPPED | OUTPATIENT
Start: 2021-07-07 | End: 2021-08-19

## 2021-07-07 NOTE — TELEPHONE ENCOUNTER
Caller: Radha Acosta    Relationship: Self    Best call back number: [phone]    Medication needed:   Requested Prescriptions     Pending Prescriptions Disp Refills   • potassium chloride (KAYCIEL) 20 MEQ/15ML (10%) solution 7 mL 0       When do you need the refill by: ASAP    What additional details did the patient provide when requesting the medication:   PLEASE CHECK ON REFILL REQUEST , PATIENT STATES THAT SHE IS ALMOST OUT OF THIS MEDICATION      Does the patient have less than a 3 day supply:  [x] Yes  [] No    What is the patient's preferred pharmacy: [address] [phone] [facility] [phone] [facility]

## 2021-07-09 DIAGNOSIS — E78.5 DYSLIPIDEMIA: ICD-10-CM

## 2021-07-09 RX ORDER — ATORVASTATIN CALCIUM 40 MG/1
TABLET, FILM COATED ORAL
Qty: 90 TABLET | Refills: 0 | Status: SHIPPED | OUTPATIENT
Start: 2021-07-09 | End: 2021-10-11

## 2021-07-15 ENCOUNTER — OFFICE VISIT (OUTPATIENT)
Dept: FAMILY MEDICINE CLINIC | Facility: CLINIC | Age: 76
End: 2021-07-15

## 2021-07-15 VITALS
HEART RATE: 60 BPM | TEMPERATURE: 97.3 F | BODY MASS INDEX: 25.99 KG/M2 | SYSTOLIC BLOOD PRESSURE: 148 MMHG | OXYGEN SATURATION: 99 % | WEIGHT: 156 LBS | HEIGHT: 65 IN | DIASTOLIC BLOOD PRESSURE: 82 MMHG

## 2021-07-15 DIAGNOSIS — I10 BENIGN ESSENTIAL HYPERTENSION: ICD-10-CM

## 2021-07-15 DIAGNOSIS — E11.29 TYPE 2 DIABETES MELLITUS WITH MICROALBUMINURIA, WITHOUT LONG-TERM CURRENT USE OF INSULIN (HCC): Primary | ICD-10-CM

## 2021-07-15 DIAGNOSIS — R80.9 TYPE 2 DIABETES MELLITUS WITH MICROALBUMINURIA, WITHOUT LONG-TERM CURRENT USE OF INSULIN (HCC): Primary | ICD-10-CM

## 2021-07-15 DIAGNOSIS — E78.5 DYSLIPIDEMIA: ICD-10-CM

## 2021-07-15 DIAGNOSIS — R79.0 LOW MAGNESIUM LEVEL: ICD-10-CM

## 2021-07-15 PROCEDURE — 99214 OFFICE O/P EST MOD 30 MIN: CPT | Performed by: NURSE PRACTITIONER

## 2021-07-15 NOTE — PROGRESS NOTES
"Subjective   Radha Acosta is a 75 y.o. female who presents for a follow up on diabetes.    History of Present Illness   HTN--less controlled of late as not going to gym with sister secondary to pandemic. Is exercising at home. Here for diabetic FU. Does not check blood sugar, but is compliant with medication. Reports healthy diet including fruits and vegetables. Does have history TIA and is on plavix and ASA. Struggles with allergies  The following portions of the patient's history were reviewed and updated as appropriate: allergies, current medications, past family history, past medical history, past social history, past surgical history and problem list.    Review of Systems   Constitutional: Positive for unexpected weight change. Negative for activity change.   HENT: Negative.    Eyes: Negative.  Negative for visual disturbance.   Respiratory: Negative.    Cardiovascular: Negative.  Negative for chest pain.   Gastrointestinal: Negative.  Negative for constipation and diarrhea.   Endocrine: Negative.    Genitourinary: Negative for difficulty urinating and frequency.   Musculoskeletal: Positive for arthralgias.   Allergic/Immunologic: Positive for environmental allergies.   Neurological: Negative for weakness and headaches.   Hematological: Negative.    Psychiatric/Behavioral: Negative.  Negative for dysphoric mood.     /82   Pulse 60   Temp 97.3 °F (36.3 °C) (Infrared)   Ht 165.1 cm (65\")   Wt 70.8 kg (156 lb)   SpO2 99%   BMI 25.96 kg/m²       Objective   Physical Exam  Vitals and nursing note reviewed.   Constitutional:       Appearance: She is well-developed. She is not diaphoretic.   HENT:      Head: Normocephalic and atraumatic.   Neck:      Thyroid: No thyromegaly.   Cardiovascular:      Rate and Rhythm: Normal rate and regular rhythm.      Pulses:           Carotid pulses are 2+ on the right side and 2+ on the left side.     Heart sounds: Normal heart sounds.   Pulmonary:      " Effort: Pulmonary effort is normal.      Breath sounds: Normal breath sounds.   Musculoskeletal:      Cervical back: Normal range of motion and neck supple.   Lymphadenopathy:      Cervical: No cervical adenopathy.   Psychiatric:         Behavior: Behavior normal.         Thought Content: Thought content normal.         Judgment: Judgment normal.       Assessment/Plan   Problems Addressed this Visit        Cardiac and Vasculature    Benign essential hypertension    Relevant Orders    CBC & Differential (Completed)    Comprehensive Metabolic Panel (Completed)       Endocrine and Metabolic    Type 2 diabetes mellitus with microalbuminuria, without long-term current use of insulin (CMS/[facility]) - Primary    Relevant Orders    Hemoglobin A1c (Completed)    Microalbumin / Creatinine Urine Ratio - Urine, Clean Catch (Completed)    TSH (Completed)    Vitamin B12 (Completed)      Other Visit Diagnoses     Dyslipidemia        Relevant Orders    Lipid Panel (Completed)    Low magnesium level        Relevant Orders    Magnesium (Completed)      Diagnoses       Codes Comments    Type 2 diabetes mellitus with microalbuminuria, without long-term current use of insulin (CMS/[facility])    -  Primary ICD-10-CM: E11.29, R80.9  ICD-9-CM: 250.40, 791.0     Dyslipidemia     ICD-10-CM: E78.5  ICD-9-CM: 272.4     Benign essential hypertension     ICD-10-CM: I10  ICD-9-CM: 401.1     Low magnesium level     ICD-10-CM: R79.0  ICD-9-CM: 790.6         K2ML--pzkekwcjnlkn well controlled, but needs to update monitoring and restart exercise  HLD--LDL goal <70 secondary to TIA  HTN--generally adequate control. Adjust medication if high again at next visit  Low magnesium--update monitoring  FU 6 months    This document is intended for medical expert use only. Reading of this document by patients and/or patient's family without participating medical staff guidance may result in misinterpretation and unintended morbidity.  Any interpretation of such data is  the responsibility of the patient and/or family member responsible for the patient in concert with their primary or specialist providers, not to be left for sources of online searches such as [employer], [employer] or similar queries. Relying on these approaches to knowledge may result in misinterpretation, misguided goals of care and even death should patients or family members try recommendations outside of the realm of professional medical care in a supervised way.    Please allow 3-5 business days for recommendations based on new results    Go to the ER for any possible lifethreatening symptoms such as chest pain or shortness of air.     I personally spent 35 minutes reviewing the chart before the visit, time with the patient, and time documenting the visit.

## 2021-07-16 LAB
ALBUMIN SERPL-MCNC: 4 G/DL (ref 3.5–5.2)
ALBUMIN/CREAT UR: 155 MG/G CREAT (ref 0–29)
ALBUMIN/GLOB SERPL: 1 G/DL
ALP SERPL-CCNC: 87 U/L (ref 39–117)
ALT SERPL-CCNC: 27 U/L (ref 1–33)
AST SERPL-CCNC: 30 U/L (ref 1–32)
BASOPHILS # BLD AUTO: 0.06 10*3/MM3 (ref 0–0.2)
BASOPHILS NFR BLD AUTO: 0.8 % (ref 0–1.5)
BILIRUB SERPL-MCNC: 0.4 MG/DL (ref 0–1.2)
BUN SERPL-MCNC: 11 MG/DL (ref 8–23)
BUN/CREAT SERPL: 16.4 (ref 7–25)
CALCIUM SERPL-MCNC: 9.3 MG/DL (ref 8.6–10.5)
CHLORIDE SERPL-SCNC: 102 MMOL/L (ref 98–107)
CHOLEST SERPL-MCNC: 107 MG/DL (ref 0–200)
CO2 SERPL-SCNC: 29.9 MMOL/L (ref 22–29)
CREAT SERPL-MCNC: 0.67 MG/DL (ref 0.57–1)
CREAT UR-MCNC: 91.2 MG/DL
EOSINOPHIL # BLD AUTO: 0.38 10*3/MM3 (ref 0–0.4)
EOSINOPHIL NFR BLD AUTO: 4.8 % (ref 0.3–6.2)
ERYTHROCYTE [DISTWIDTH] IN BLOOD BY AUTOMATED COUNT: 13.7 % (ref 12.3–15.4)
GLOBULIN SER CALC-MCNC: 4 GM/DL
GLUCOSE SERPL-MCNC: 118 MG/DL (ref 65–99)
HBA1C MFR BLD: 5.8 % (ref 4.8–5.6)
HCT VFR BLD AUTO: 41.5 % (ref 34–46.6)
HDLC SERPL-MCNC: 37 MG/DL (ref 40–60)
HGB BLD-MCNC: 14 G/DL (ref 12–15.9)
IMM GRANULOCYTES # BLD AUTO: 0.02 10*3/MM3 (ref 0–0.05)
IMM GRANULOCYTES NFR BLD AUTO: 0.3 % (ref 0–0.5)
LDLC SERPL CALC-MCNC: 45 MG/DL (ref 0–100)
LYMPHOCYTES # BLD AUTO: 2.08 10*3/MM3 (ref 0.7–3.1)
LYMPHOCYTES NFR BLD AUTO: 26.2 % (ref 19.6–45.3)
MAGNESIUM SERPL-MCNC: 1.6 MG/DL (ref 1.6–2.4)
MCH RBC QN AUTO: 31 PG (ref 26.6–33)
MCHC RBC AUTO-ENTMCNC: 33.7 G/DL (ref 31.5–35.7)
MCV RBC AUTO: 91.8 FL (ref 79–97)
MICROALBUMIN UR-MCNC: 141.8 UG/ML
MONOCYTES # BLD AUTO: 0.66 10*3/MM3 (ref 0.1–0.9)
MONOCYTES NFR BLD AUTO: 8.3 % (ref 5–12)
NEUTROPHILS # BLD AUTO: 4.75 10*3/MM3 (ref 1.7–7)
NEUTROPHILS NFR BLD AUTO: 59.6 % (ref 42.7–76)
NRBC BLD AUTO-RTO: 0 /100 WBC (ref 0–0.2)
PLATELET # BLD AUTO: 233 10*3/MM3 (ref 140–450)
POTASSIUM SERPL-SCNC: 4 MMOL/L (ref 3.5–5.2)
PROT SERPL-MCNC: 8 G/DL (ref 6–8.5)
RBC # BLD AUTO: 4.52 10*6/MM3 (ref 3.77–5.28)
SODIUM SERPL-SCNC: 142 MMOL/L (ref 136–145)
TRIGL SERPL-MCNC: 148 MG/DL (ref 0–150)
TSH SERPL DL<=0.005 MIU/L-ACNC: 1.41 UIU/ML (ref 0.27–4.2)
VIT B12 SERPL-MCNC: 486 PG/ML (ref 211–946)
VLDLC SERPL CALC-MCNC: 25 MG/DL (ref 5–40)
WBC # BLD AUTO: 7.95 10*3/MM3 (ref 3.4–10.8)

## 2021-07-19 NOTE — PROGRESS NOTES
Please call the patient regarding her labs. Diabetes improved, with improving amounts of protein in the urine. Electrolytes normal. Cholesterol acceptable. FU 6 months

## 2021-07-20 ENCOUNTER — TELEPHONE (OUTPATIENT)
Dept: FAMILY MEDICINE CLINIC | Facility: CLINIC | Age: 76
End: 2021-07-20

## 2021-07-20 NOTE — TELEPHONE ENCOUNTER
Patient says she thought she was getting rx for a fungal cream to use under her breasts. She would like this sent into pharmacy if she can still have it.

## 2021-07-20 NOTE — TELEPHONE ENCOUNTER
PATIENT CALLING IN REGARDS TO SPEAK WITH OLGA PAT'S ASSISTANT ABOUT A A FUNGAL CREAM BEING CALLED IN TO [facility]. PLEASE ADVISE, THANK YOU

## 2021-07-26 RX ORDER — FELODIPINE 10 MG/1
TABLET, EXTENDED RELEASE ORAL
Qty: 90 TABLET | Refills: 1 | Status: SHIPPED | OUTPATIENT
Start: 2021-07-26 | End: 2022-01-10

## 2021-08-02 RX ORDER — DEXTROMETHORPHAN HYDROBROMIDE AND PROMETHAZINE HYDROCHLORIDE 15; 6.25 MG/5ML; MG/5ML
SYRUP ORAL
Qty: 118 ML | Refills: 0 | OUTPATIENT
Start: 2021-08-02

## 2021-08-05 RX ORDER — DEXTROMETHORPHAN HYDROBROMIDE AND PROMETHAZINE HYDROCHLORIDE 15; 6.25 MG/5ML; MG/5ML
SYRUP ORAL
Qty: 118 ML | Refills: 0 | OUTPATIENT
Start: 2021-08-05

## 2021-08-19 DIAGNOSIS — I10 BENIGN ESSENTIAL HYPERTENSION: ICD-10-CM

## 2021-08-19 RX ORDER — DEXTROMETHORPHAN HYDROBROMIDE AND PROMETHAZINE HYDROCHLORIDE 15; 6.25 MG/5ML; MG/5ML
SYRUP ORAL
Qty: 118 ML | Refills: 0 | Status: SHIPPED | OUTPATIENT
Start: 2021-08-19 | End: 2021-12-29

## 2021-08-19 RX ORDER — POTASSIUM CHLORIDE 20MEQ/15ML
LIQUID (ML) ORAL
Qty: 480 ML | Refills: 0 | Status: SHIPPED | OUTPATIENT
Start: 2021-08-19 | End: 2021-09-22

## 2021-08-23 NOTE — TELEPHONE ENCOUNTER
Last ov 7/15/21   Stage 5: Additional Anesthesia Type: 1% lidocaine with 1:100,000 epinephrine and a 1:10 solution of 8.4% sodium bicarbonate

## 2021-08-30 DIAGNOSIS — Z91.09 ENVIRONMENTAL ALLERGIES: ICD-10-CM

## 2021-08-30 DIAGNOSIS — R00.2 PALPITATIONS: ICD-10-CM

## 2021-08-30 RX ORDER — MONTELUKAST SODIUM 10 MG/1
TABLET ORAL
Qty: 90 TABLET | Refills: 0 | Status: SHIPPED | OUTPATIENT
Start: 2021-08-30 | End: 2021-12-01

## 2021-08-30 RX ORDER — METOPROLOL SUCCINATE 100 MG/1
TABLET, EXTENDED RELEASE ORAL
Qty: 90 TABLET | Refills: 0 | Status: SHIPPED | OUTPATIENT
Start: 2021-08-30 | End: 2021-12-01

## 2021-09-03 RX ORDER — CLOPIDOGREL BISULFATE 75 MG/1
TABLET ORAL
Qty: 90 TABLET | Refills: 1 | Status: SHIPPED | OUTPATIENT
Start: 2021-09-03 | End: 2022-03-04

## 2021-09-21 DIAGNOSIS — I10 BENIGN ESSENTIAL HYPERTENSION: ICD-10-CM

## 2021-09-22 RX ORDER — POTASSIUM CHLORIDE 20MEQ/15ML
LIQUID (ML) ORAL
Qty: 480 ML | Refills: 0 | Status: SHIPPED | OUTPATIENT
Start: 2021-09-22 | End: 2021-11-15

## 2021-09-22 RX ORDER — LOSARTAN POTASSIUM 100 MG/1
TABLET ORAL
Qty: 90 TABLET | Refills: 0 | Status: SHIPPED | OUTPATIENT
Start: 2021-09-22 | End: 2021-12-16

## 2021-09-23 RX ORDER — DEXTROMETHORPHAN HYDROBROMIDE AND PROMETHAZINE HYDROCHLORIDE 15; 6.25 MG/5ML; MG/5ML
SYRUP ORAL
Qty: 118 ML | Refills: 0 | OUTPATIENT
Start: 2021-09-23

## 2021-09-27 RX ORDER — DEXTROMETHORPHAN HYDROBROMIDE AND PROMETHAZINE HYDROCHLORIDE 15; 6.25 MG/5ML; MG/5ML
SYRUP ORAL
Qty: 118 ML | Refills: 0 | OUTPATIENT
Start: 2021-09-27

## 2021-10-10 DIAGNOSIS — E78.5 DYSLIPIDEMIA: ICD-10-CM

## 2021-10-11 RX ORDER — ATORVASTATIN CALCIUM 40 MG/1
TABLET, FILM COATED ORAL
Qty: 90 TABLET | Refills: 0 | Status: SHIPPED | OUTPATIENT
Start: 2021-10-11 | End: 2022-01-10

## 2021-11-12 DIAGNOSIS — I10 BENIGN ESSENTIAL HYPERTENSION: ICD-10-CM

## 2021-11-15 RX ORDER — POTASSIUM CHLORIDE 20MEQ/15ML
LIQUID (ML) ORAL
Qty: 480 ML | Refills: 0 | Status: SHIPPED | OUTPATIENT
Start: 2021-11-15 | End: 2022-01-04

## 2021-11-16 ENCOUNTER — OFFICE VISIT (OUTPATIENT)
Dept: FAMILY MEDICINE CLINIC | Facility: CLINIC | Age: 76
End: 2021-11-16

## 2021-11-16 VITALS
DIASTOLIC BLOOD PRESSURE: 88 MMHG | SYSTOLIC BLOOD PRESSURE: 162 MMHG | OXYGEN SATURATION: 96 % | HEART RATE: 52 BPM | WEIGHT: 164 LBS | BODY MASS INDEX: 27.32 KG/M2 | HEIGHT: 65 IN

## 2021-11-16 DIAGNOSIS — M25.551 HIP PAIN, CHRONIC, RIGHT: Primary | ICD-10-CM

## 2021-11-16 DIAGNOSIS — G89.29 HIP PAIN, CHRONIC, RIGHT: Primary | ICD-10-CM

## 2021-11-16 PROCEDURE — 99213 OFFICE O/P EST LOW 20 MIN: CPT | Performed by: NURSE PRACTITIONER

## 2021-11-16 PROCEDURE — 73502 X-RAY EXAM HIP UNI 2-3 VIEWS: CPT | Performed by: NURSE PRACTITIONER

## 2021-11-16 RX ORDER — TIZANIDINE 2 MG/1
2 TABLET ORAL EVERY 8 HOURS PRN
Qty: 30 TABLET | Refills: 0 | Status: SHIPPED | OUTPATIENT
Start: 2021-11-16 | End: 2022-04-22

## 2021-11-16 RX ORDER — METHYLPREDNISOLONE 4 MG/1
TABLET ORAL
Qty: 21 TABLET | Refills: 0 | Status: SHIPPED | OUTPATIENT
Start: 2021-11-16 | End: 2022-04-08

## 2021-11-16 NOTE — PROGRESS NOTES
"Chief Complaint  Leg Pain (Right)    Subjective          Radha Acosta presents to [facility]  History of Present Illness     Patient is a patient of ALONDRA Galvan.  This is my first time seeing this patient.  She presents today with complaint of low back pain with pain radiating down right leg since for a while.   Pain is in outer leg that goes down to knee.   For last couple weeks has had the pain when she lays down  She reports it is not in lower back, but just in hip. Denies any numbness of tingling.     She fell a few years ago down steps and has a nagging pain off and on.  States that she has gained a little weight  After she stands for a few minutes and then she has to go down        OTC: 2 aleve- that does help for a while.      Also her BP has been elevated a little bit.     Objective   Vital Signs:   /88   Pulse 52   Ht 165.1 cm (65\")   Wt 74.4 kg (164 lb)   SpO2 96%   BMI 27.29 kg/m²     Physical Exam  Constitutional:       Appearance: Normal appearance.   Musculoskeletal:      Right hip: Tenderness present. Normal range of motion. Normal strength.   Neurological:      Mental Status: She is alert and oriented to person, place, and time.   Psychiatric:         Mood and Affect: Mood normal.         Behavior: Behavior normal.         Thought Content: Thought content normal.         Judgment: Judgment normal.        Result Review :                 Assessment and Plan    Diagnoses and all orders for this visit:    1. Hip pain, chronic, right (Primary)  -     XR Hip With or Without Pelvis 2 - 3 View Right    Other orders  -     tiZANidine (ZANAFLEX) 2 MG tablet; Take 1 tablet by mouth Every 8 (Eight) Hours As Needed for Muscle Spasms.  Dispense: 30 tablet; Refill: 0  -     methylPREDNISolone (MEDROL) 4 MG dose pack; Take as directed on package instructions.  Dispense: 21 tablet; Refill: 0    Will obtain x-ray. Will call with results and any changes needed to " plan of care.      Start muscle relaxer and steroid. If no improvement follow up as needed.         Follow Up {Instructions Charge Capture  Follow-up Communications :23}  No follow-ups on file.  Patient was given instructions and counseling regarding her condition or for health maintenance advice. Please see specific information pulled into the AVS if appropriate.

## 2021-11-17 ENCOUNTER — TELEPHONE (OUTPATIENT)
Dept: FAMILY MEDICINE CLINIC | Facility: CLINIC | Age: 76
End: 2021-11-17

## 2021-11-17 DIAGNOSIS — M76.891 ENTHESOPATHY OF RIGHT HIP REGION: Primary | ICD-10-CM

## 2021-11-17 NOTE — TELEPHONE ENCOUNTER
Please let patient know there is a abnormal bony projection at attachment of tendon or ligament. I would like her to see orthopedist for further evaluation.  Please see if she is agreeable and whom she would like to see and I will place referral.

## 2021-12-01 DIAGNOSIS — Z91.09 ENVIRONMENTAL ALLERGIES: ICD-10-CM

## 2021-12-01 DIAGNOSIS — R00.2 PALPITATIONS: ICD-10-CM

## 2021-12-01 RX ORDER — METOPROLOL SUCCINATE 100 MG/1
TABLET, EXTENDED RELEASE ORAL
Qty: 90 TABLET | Refills: 0 | Status: SHIPPED | OUTPATIENT
Start: 2021-12-01 | End: 2022-03-04

## 2021-12-01 RX ORDER — MONTELUKAST SODIUM 10 MG/1
TABLET ORAL
Qty: 90 TABLET | Refills: 0 | Status: SHIPPED | OUTPATIENT
Start: 2021-12-01 | End: 2022-03-04

## 2021-12-03 ENCOUNTER — OFFICE VISIT (OUTPATIENT)
Dept: ORTHOPEDIC SURGERY | Facility: CLINIC | Age: 76
End: 2021-12-03

## 2021-12-03 VITALS — HEIGHT: 65 IN | BODY MASS INDEX: 27.32 KG/M2 | WEIGHT: 164 LBS | TEMPERATURE: 97.1 F

## 2021-12-03 DIAGNOSIS — M70.61 TROCHANTERIC BURSITIS OF RIGHT HIP: Primary | ICD-10-CM

## 2021-12-03 DIAGNOSIS — M51.36 DDD (DEGENERATIVE DISC DISEASE), LUMBAR: ICD-10-CM

## 2021-12-03 PROCEDURE — 99204 OFFICE O/P NEW MOD 45 MIN: CPT | Performed by: ORTHOPAEDIC SURGERY

## 2021-12-03 PROCEDURE — 73501 X-RAY EXAM HIP UNI 1 VIEW: CPT | Performed by: ORTHOPAEDIC SURGERY

## 2021-12-03 NOTE — PROGRESS NOTES
Patient Name: Radha Acosta   YOB: 1945  Referring Primary Care Physician: Beatriz Bernal APRN  BMI: Body mass index is 27.29 kg/m².    Chief Complaint:    Chief Complaint   Patient presents with   • Right Hip - Initial Evaluation        HPI:     Radha Acosta is a 76 y.o. female who presents today for evaluation of   Chief Complaint   Patient presents with   • Right Hip - Initial Evaluation   .  Patient is seen today complaining of some right hip pain.  She says when her daughter graduated from [employer] about 3 years ago she had a fall celebrating [place].  Nags her off and on it hurts over the right lateral buttock and sometimes radiates down to about the knee.  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  She is originally from [place] and then moved to [place] and then to [place] [place].  He says she takes some Aleve for it it does help and she recently completed a Medrol Dosepak about a week ago and thinks is helping as well and she is not had physical therapy.      Subjective   Medications:   Home Medications:  Current Outpatient Medications on File Prior to Visit   Medication Sig   • aspirin 81 MG chewable tablet Chew daily.   • atorvastatin (LIPITOR) 40 MG tablet TAKE 1 TABLET BY MOUTH ONCE DAILY AT NIGHT   • Cetirizine HCl 10 MG capsule Take  by mouth.   • clopidogrel (PLAVIX) 75 MG tablet Take 1 tablet by mouth once daily   • Clotrimazole 1 % ointment Apply 1 application topically 2 (two) times a day.   • felodipine (PLENDIL) 10 MG 24 hr tablet Take 1 tablet by mouth once daily   • losartan (COZAAR) 100 MG tablet Take 1 tablet by mouth once daily   • magnesium oxide (MAGOX) 400 (241.3 Mg) MG tablet tablet Take 1 tablet by mouth once daily   • meloxicam (MOBIC) 7.5 MG tablet Take 1 tablet by mouth once daily   • metFORMIN (GLUCOPHAGE) 500 MG tablet TAKE 1 TABLET BY MOUTH TWICE DAILY WITH MEALS   • methylPREDNISolone (MEDROL) 4 MG dose pack  Take as directed on package instructions.   • metoprolol succinate XL (TOPROL-XL) 100 MG 24 hr tablet Take 1 tablet by mouth once daily   • montelukast (SINGULAIR) 10 MG tablet TAKE 1 TABLET BY MOUTH ONCE DAILY AT NIGHT   • potassium chloride (KAYCIEL) 20 mEq/15 mL solution TAKE 15 ML BY MOUTH  ONCE DAILY   • promethazine-dextromethorphan (PROMETHAZINE-DM) 6.25-15 MG/5ML syrup TAKE 2 1/2 (ONE-HALF) ML BY MOUTH  4 TIMES DAILY AS NEEDED FOR COUGH   • selenium sulfide (SELSUN) 2.5 % shampoo Apply  topically Daily As Needed for Irritation or Itching.   • tiZANidine (ZANAFLEX) 2 MG tablet Take 1 tablet by mouth Every 8 (Eight) Hours As Needed for Muscle Spasms.   • triamcinolone (KENALOG) 0.1 % cream APPLY ONE APPLICATION TOPICALLY TWICE DAILY     No current facility-administered medications on file prior to visit.     Current Medications:  Scheduled Meds:  Continuous Infusions:No current facility-administered medications for this visit.    PRN Meds:.    I have reviewed the patient's medical history in detail and updated the computerized patient record.  Review and summarization of old records includes:    Past Medical History:   Diagnosis Date   • Allergic rhinitis    • Bell's palsy    • Cerebral atherosclerosis    • Chronic sinusitis    • Diabetes mellitus (HCC)    • Familial hypertriglyceridemia    • GERD (gastroesophageal reflux disease)    • Hypertension    • Low back pain    • Osteoarthritis of hands, bilateral    • Palpitations         Past Surgical History:   Procedure Laterality Date   • APPENDECTOMY     • BREAST BIOPSY Left     x2 (Benign x2)   • CHOLECYSTECTOMY OPEN          Social History     Occupational History   • Not on file   Tobacco Use   • Smoking status: Never Smoker   • Smokeless tobacco: Never Used   Substance and Sexual Activity   • Alcohol use: Never   • Drug use: Not on file   • Sexual activity: Not on file      Social History     Social History Narrative   • Not on file        Family History  "  Problem Relation Age of Onset   • Diabetes Mother    • Leukemia Daughter        ROS: 14 point review of systems was performed and all other systems were reviewed and are negative except for documented findings in HPI and today's encounter.     Allergies:   Allergies   Allergen Reactions   • Beta Adrenergic Blockers Other (See Comments)     Unknown, thinks may cause cough   • Codeine Nausea And Vomiting   • Latex Rash   • Lisinopril-Hydrochlorothiazide Other (See Comments)     Cannot remember what reaction occured     Constitutional:  Denies fever, shaking or chills   Eyes:  Denies change in visual acuity   HENT:  Denies nasal congestion or sore throat   Respiratory:  Denies cough or shortness of breath   Cardiovascular:  Denies chest pain or severe LE edema   GI:  Denies abdominal pain, nausea, vomiting, bloody stools or diarrhea   Musculoskeletal:  Numbness, tingling, pain, or loss of motor function only as noted above in history of present illness.  : Denies painful urination or hematuria  Integument:  Denies rash, lesion or ulceration   Neurologic:  Denies headache or focal weakness  Endocrine:  Denies lymphadenopathy  Psych:  Denies confusion or change in mental status   Hem:  Denies active bleeding    OBJECTIVE:  Physical Exam: 76 y.o. female  Wt Readings from Last 3 Encounters:   12/03/21 74.4 kg (164 lb)   11/16/21 74.4 kg (164 lb)   07/15/21 70.8 kg (156 lb)     Ht Readings from Last 1 Encounters:   12/03/21 165.1 cm (65\")     Body mass index is 27.29 kg/m².  Vitals:    12/03/21 1052   Temp: 97.1 °F (36.2 °C)     Vital signs reviewed.     General Appearance:    Alert, cooperative, in no acute distress                  Eyes: conjunctiva clear  ENT: external ears and nose atraumatic  CV: no peripheral edema  Resp: normal respiratory effort  Skin: no rashes or wounds; normal turgor  Psych: mood and affect appropriate  Lymph: no nodes appreciated  Neuro: gross sensation intact  Vascular:  Palpable peripheral " "pulse in noted extremity  Musculoskeletal Extremities: [facility] today shows pleasant lady she has mild to moderate tenderness around posterior greater trochanter on the right she is not tender over the sacroiliac joint Corey test is negative she does have some diffuse lumbar tenderness hip has good rotation and not tender to axial loading or rotation.  She says she does some stretches on her own in the mornings try to keep her flexibility    Radiology:   AP of the hips lateral right hip taken the office today for complaints of pain without comparison views available other than report from hip x-rays she had the hospital showed no acute changes pretty good joint space may be subtle degenerative change in lumbar spine.  She does have a slight calcification over the pelvis that was read as a \"enthesopathy\" by the radiologist and that is what she was referred for.  She is not tender in that area and explained to her that just a calcification of her tendon and not a disease.  And x-ray findings        Assessment:     ICD-10-CM ICD-9-CM   1. Trochanteric bursitis of right hip  M70.61 726.5   2. DDD (degenerative disc disease), lumbar  M51.36 722.52        MDM/Plan:   The diagnosis(es), natural history, pathophysiology and treatment for diagnosis(es) were discussed. Opportunity given and questions answered.  Biomechanics of pertinent body areas discussed.  When appropriate, the use of ambulatory aids discussed.    EXERCISES:  Advice on benefits of, and types of regular/moderate exercise pertaining to orthopedic diagnosis(es).  MEDICATIONS:  The risks, benefits, warnings,side effects and alternatives of medications discussed.  Inflammation/pain control; with cold, heat, elevation and/or liniments discussed as appropriate  PT referral.  MEDICAL RECORDS reviewed from other provider(s) for past and current medical history pertinent to this complaint.  I cautioned her about taking anti-inflammatories that she is on Plavix and " aspirin and told her about the risk of bleeding she should go with Tylenol but does not help she had a Medrol Dosepak that appears to be helping I told her that if she had it last week he could still continue to improve use ice heat liniments and do the physical therapy and develop a home program.  She developed severe pain over the trochanter she can be sent to sports medicine for injections    12/3/2021    Dictated utilizing Dragon dictation

## 2021-12-16 RX ORDER — LOSARTAN POTASSIUM 100 MG/1
TABLET ORAL
Qty: 90 TABLET | Refills: 0 | Status: SHIPPED | OUTPATIENT
Start: 2021-12-16 | End: 2022-03-15

## 2021-12-29 RX ORDER — DEXTROMETHORPHAN HYDROBROMIDE AND PROMETHAZINE HYDROCHLORIDE 15; 6.25 MG/5ML; MG/5ML
SYRUP ORAL
Qty: 118 ML | Refills: 0 | Status: SHIPPED | OUTPATIENT
Start: 2021-12-29 | End: 2022-02-10

## 2022-01-04 DIAGNOSIS — I10 BENIGN ESSENTIAL HYPERTENSION: ICD-10-CM

## 2022-01-04 RX ORDER — POTASSIUM CHLORIDE 20MEQ/15ML
LIQUID (ML) ORAL
Qty: 480 ML | Refills: 0 | Status: SHIPPED | OUTPATIENT
Start: 2022-01-04 | End: 2022-02-23

## 2022-01-10 DIAGNOSIS — E78.5 DYSLIPIDEMIA: ICD-10-CM

## 2022-01-10 RX ORDER — FELODIPINE 10 MG/1
TABLET, EXTENDED RELEASE ORAL
Qty: 90 TABLET | Refills: 0 | Status: SHIPPED | OUTPATIENT
Start: 2022-01-10 | End: 2022-04-18

## 2022-01-10 RX ORDER — ATORVASTATIN CALCIUM 40 MG/1
TABLET, FILM COATED ORAL
Qty: 90 TABLET | Refills: 0 | Status: SHIPPED | OUTPATIENT
Start: 2022-01-10 | End: 2022-04-07

## 2022-01-14 ENCOUNTER — TREATMENT (OUTPATIENT)
Dept: PHYSICAL THERAPY | Facility: CLINIC | Age: 77
End: 2022-01-14

## 2022-01-14 DIAGNOSIS — R26.9 GAIT DISTURBANCE: ICD-10-CM

## 2022-01-14 DIAGNOSIS — M25.559 PAIN, HIP: Primary | ICD-10-CM

## 2022-01-14 DIAGNOSIS — M54.50 LOW BACK PAIN, UNSPECIFIED BACK PAIN LATERALITY, UNSPECIFIED CHRONICITY, UNSPECIFIED WHETHER SCIATICA PRESENT: ICD-10-CM

## 2022-01-14 PROCEDURE — 97110 THERAPEUTIC EXERCISES: CPT | Performed by: PHYSICAL THERAPIST

## 2022-01-14 PROCEDURE — 97161 PT EVAL LOW COMPLEX 20 MIN: CPT | Performed by: PHYSICAL THERAPIST

## 2022-01-14 NOTE — PROGRESS NOTES
"Physical Therapy Initial Evaluation and Plan of Care      Patient: Radha Acosta   : 1945  Diagnosis/ICD-10 Code:  Pain, hip [M25.559]  Referring practitioner: Michael Dia MD  Date of Initial Visit: 2022  Today's Date: 2022  Patient seen for 1 sessions    Progress note due: 2022  Re-cert due: 2022           Subjective Questionnaire: LEFS: 69/80    PMH: low back pain, HTN, GERD, Bell's palsy, hx of cancer         Subjective Evaluation    History of Present Illness  Mechanism of injury: Pt states she her hip hurts worse with prolonged walking or standing. She needs to hold onto a cart to reduce pain. Pain improves with sitting and sleeping does not bother her. She tries stretch on her own at home. Pain has gotten worse in the past few weeks. She is currently not taking anything for pain.  She tried muscle relaxers once but stopped because she does not like taking strong medicines. Her home has 3 levels but has no trouble with stairs. Denies any issues with balance and any falls. She does not having pain with squatting. She can stand approx 30 minutes max before she has to sit and rest the R hip.       H&P from MD visit on 12/3/21: \" Patient is seen today complaining of some right hip pain.  She says when her daughter graduated from [employer] about 3 years ago she had a fall celebrating [employer].  Nags her off and on it hurts over the right lateral buttock and sometimes radiates down to about the knee.  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  She is originally from [place] and then moved to [place] and then to [facility].  He says she takes some Aleve for it it does help and she recently completed a Medrol Dosepak about a week ago and thinks is helping as well and she is not had physical therapy.\"    Patient Goals  Patient goals for therapy: decreased pain, increased motion, increased strength and independence with ADLs/IADLs       "       Objective          Palpation   Left   No palpable tenderness to the erector spinae.     Right   No palpable tenderness to the erector spinae.     Tenderness     Left Hip   No tenderness in the greater trochanter.     Right Hip   No tenderness in the greater trochanter.     Neurological Testing     Sensation     Lumbar   Left   Intact: light touch    Right   Intact: light touch    Hip   Left Hip   Intact: light touch    Right Hip   Intact: light touch    Active Range of Motion     Lumbar   Normal active range of motion  Left Knee   Normal active range of motion    Right Knee   Normal active range of motion    Strength/Myotome Testing     Left Hip   Planes of Motion   Flexion: 4+  Extension: 4-  Abduction: 4-    Right Hip   Planes of Motion   Flexion: 4+  Extension: 4-  Abduction: 4    Left Knee   Flexion: 4+  Extension: 4+    Right Knee   Flexion: 4+  Extension: 4+    Tests     Lumbar   Negative repeated extension and repeated flexion.     Functional Assessment   Squat No pain, no left valgus and no right valgus.               Assessment & Plan     Assessment  Impairments: abnormal or restricted ROM, activity intolerance, impaired physical strength, lacks appropriate home exercise program and weight-bearing intolerance  Functional Limitations: walking, uncomfortable because of pain, standing and unable to perform repetitive tasks  Assessment details: Pt presents with right hip pain and has difficulty with prolonged standing and walking greater than 30 minutes. Pt has weakness of bilateral hip as seen with testing. Pt will benefit from skilled PT to address functional deficits and return to PLOF.         Goals  Plan Goals: HIP PROBLEMS    1. The patient complains of R hip pain.   LTG 1: 6 weeks:  The patient will report a pain rating of 12/10 or better in order to improve  tolerance to activities of daily living and improve sleep quality.    STATUS:  New   STG 1a: 3 weeks:  The patient will report a pain rating  of 4/10 or better.    STATUS:  New    2. The patient demonstrates weakness of the B hip.   LTG 2: 8 weeks:  The patient will demonstrate 4+/5 strength for B hip flexion, abduction,  and extension in order to improve hip stability.    STATUS:  New   STG 2a: 4 weeks:  The patient will demonstrate 4/5 strength for B hip flexion, abduction,  and extension.    STATUS:  New   TREATMENT: Therapeutic exercises, manual therapy, aquatic therapy, home exercise instruction,  and modalities as needed for pain to include:  electrical stimulation, moist heat, ice, and ultrasound    3. The patient has gait dysfunction.  LTG 3: 8 weeks:  The patient will ambulate without assistive device, independently, for community distances with minimal limp to the R lower extremity in order to improve mobility and allow patient to perform activities such as grocery shopping with greater ease.  STATUS:  New  STG 4a: The patient will be independent in HEP.  STATUS:  New  TREATMENT: Gait training, aquatic therapy, therapeutic exercise, and home exercise instruction.    4. Mobility: Walking/Moving Around Functional Limitation    LTG 4: 8 weeks:  The patient will demonstrate LEFS score to 75/80 to decrease functional limitations.   STATUS:  New      Plan  Therapy options: will be seen for skilled therapy services  Planned modality interventions: cryotherapy, TENS and thermotherapy (hydrocollator packs)  Planned therapy interventions: manual therapy, neuromuscular re-education, soft tissue mobilization, strengthening, therapeutic activities, stretching, home exercise program, functional ROM exercises, flexibility and balance/weight-bearing training  Frequency: 2x week  Duration in weeks: 8  Treatment plan discussed with: patient        Visit Diagnoses:    ICD-10-CM ICD-9-CM   1. Pain, hip  M25.559 719.45   2. Low back pain, unspecified back pain laterality, unspecified chronicity, unspecified whether sciatica present  M54.50 724.2   3. Gait  disturbance  R26.9 781.2       Timed:         Manual Therapy:    0     mins  22030;     Therapeutic Exercise:    15     mins  54825;     Neuromuscular Kiet:    0    mins  12568;    Therapeutic Activity:     0     mins  91064;     Gait Trainin     mins  19264;     Ultrasound:     0     mins  24985;    Ionto                               0    mins   10630  Self-care  __0__ mins 64196    Un-Timed:  Electrical Stimulation:    0     mins  34387 (MC );  Traction     0     mins 38826  Low Eval     30     Mins  37160  Mod Eval     0     Mins  38909  High Eval                       0     Mins  57624  Hot pack     0     Mins    Cold pack                       0     Mins      Timed Treatment:   15   mins   Total Treatment:     45   mins     PT SIGNATURE: Marlena Walton PT, DPT      Initial Certification  Certification Period: 2022 thru 2022  I certify that the therapy services are furnished while this patient is under my care.  The services outlined above are required by this patient, and will be reviewed every 90 days.     PHYSICIAN: Michael Dia MD   NPI: [id number]     DATE:     Please sign and return via fax to [phone].. Thank you, [facility].

## 2022-01-18 ENCOUNTER — TELEPHONE (OUTPATIENT)
Dept: PHYSICAL THERAPY | Facility: CLINIC | Age: 77
End: 2022-01-18

## 2022-01-18 NOTE — TELEPHONE ENCOUNTER
COULDN'T MAKE APPT TIME - IF THERE IS ANY AVAILABILITY ON Thursday GIVE HER A CALL AS SHE THOUGHT SHE WAS SCHEDULED BUT DIDN'T WANT ME TO LOOK AND SEE IF THERE WAS ANYTHING  - JUST SAID TO GIVE HER A CALL IF ANYTHING IS AVAILABLE ON Thursday

## 2022-01-25 ENCOUNTER — TREATMENT (OUTPATIENT)
Dept: PHYSICAL THERAPY | Facility: CLINIC | Age: 77
End: 2022-01-25

## 2022-01-25 DIAGNOSIS — R26.9 GAIT DISTURBANCE: ICD-10-CM

## 2022-01-25 DIAGNOSIS — M25.559 PAIN, HIP: Primary | ICD-10-CM

## 2022-01-25 DIAGNOSIS — M54.50 LOW BACK PAIN, UNSPECIFIED BACK PAIN LATERALITY, UNSPECIFIED CHRONICITY, UNSPECIFIED WHETHER SCIATICA PRESENT: ICD-10-CM

## 2022-01-25 PROCEDURE — 97530 THERAPEUTIC ACTIVITIES: CPT | Performed by: PHYSICAL THERAPIST

## 2022-01-25 PROCEDURE — 97110 THERAPEUTIC EXERCISES: CPT | Performed by: PHYSICAL THERAPIST

## 2022-01-25 PROCEDURE — 97140 MANUAL THERAPY 1/> REGIONS: CPT | Performed by: PHYSICAL THERAPIST

## 2022-01-25 NOTE — PROGRESS NOTES
Physical Therapy Daily Progress Note        Patient: Radha Acosta   : 1945  Diagnosis/ICD-10 Code:  Pain, hip [M25.559]  Referring practitioner: Michael Dia MD  Date of Initial Visit: No linked episodes  Today's Date: 2022  Patient seen for Visit count could not be calculated. Make sure you are using a visit which is associated with an episode. sessions             Subjective   Radha Acosta rates her right lower back and lateral hip-Leg pain- 7/10 upon arrival.    Objective     Poor ability to obtain and maintain Posterior Pelvic Tilt    See Exercise and Manual Logs for complete treatment.       Assessment/Plan    Pt tolerated therapy session well - with progression of therapeutic exercises, CKC-Functional activities, and Manual therapy. She has improved, but continues to have deficits in Trunk and Bilateral Lower Extremity ROM,  Strength, and Stability; limiting function and ability to perform ADLs at this time. Good response to Manual stretching today; as evidenced by pt reporting less pain and tightness post session.    Progress per Plan of Care           Timed:  Manual Therapy:    8     mins  82341;  Therapeutic Exercise:    20     mins  71209;     Neuromuscular Kiet:    0    mins  59854;    Therapeutic Activity:     10     mins  74744;     Gait Trainin     mins  49487;     Ultrasound:     0     mins  94435;    Electrical Stimulation:    0     mins  16890;  Iontophoresis     0     mins  79780    Untimed:  Electrical Stimulation:    0     mins  64236 ( );  Mechanical Traction:    00     mins  50490;   Fluidotherapy     0     mins  48904  Hot pack     0     mins  97838  Cold pack     0     mins  53757    Timed Treatment:   38   mins   Total Treatment:     38   mins        Juanita Umaña PTA  Physical Therapist Assistant

## 2022-02-02 ENCOUNTER — TREATMENT (OUTPATIENT)
Dept: PHYSICAL THERAPY | Facility: CLINIC | Age: 77
End: 2022-02-02

## 2022-02-02 DIAGNOSIS — R26.9 GAIT DISTURBANCE: ICD-10-CM

## 2022-02-02 DIAGNOSIS — M54.50 LOW BACK PAIN, UNSPECIFIED BACK PAIN LATERALITY, UNSPECIFIED CHRONICITY, UNSPECIFIED WHETHER SCIATICA PRESENT: ICD-10-CM

## 2022-02-02 DIAGNOSIS — M25.559 PAIN, HIP: Primary | ICD-10-CM

## 2022-02-02 PROCEDURE — 97110 THERAPEUTIC EXERCISES: CPT | Performed by: PHYSICAL THERAPIST

## 2022-02-02 NOTE — PROGRESS NOTES
Physical Therapy Daily Treatment Note      Patient: Radha Cruz   : 1945  Referring practitioner: Michael Dia MD  Date of Initial Visit: Type: THERAPY  Noted: 2022  Today's Date: 2022  Patient seen for 3 sessions         Radha Cruz reports: hip and back pain was 7/10 this morning but improved to 4/10 after a hot shower      Subjective     Objective   See Exercise, Manual, and Modality Logs for complete treatment.       Assessment & Plan     Assessment    Assessment details: Tolerated session and was able to progress BLE strengthening exercises. No pain reported. Progress next visit as tolerated.         Visit Diagnoses:    ICD-10-CM ICD-9-CM   1. Pain, hip  M25.559 719.45   2. Low back pain, unspecified back pain laterality, unspecified chronicity, unspecified whether sciatica present  M54.50 724.2   3. Gait disturbance  R26.9 781.2       Progress per Plan of Care           Timed:         Manual Therapy:    0     mins  66565;     Therapeutic Exercise:    30     mins  67778;     Neuromuscular Kiet:    0    mins  10391;    Therapeutic Activity:     0     mins  10470;     Gait Trainin     mins  05643;     Ultrasound:     0     mins  22291;    Ionto                               0    mins   98706  Self-care  __0__ mins 18193    Un-Timed:  Electrical Stimulation:    0     mins  54867 ( );  Traction     0     mins 55282  Hot pack     0     Mins    Cold pack                       0     Mins      Timed Treatment:   30   mins   Total Treatment:     30   mins    Marlena Walton PT, DPT  Physical Therapist    NPI:[id number]  [place] License: [id number]

## 2022-02-09 ENCOUNTER — TREATMENT (OUTPATIENT)
Dept: PHYSICAL THERAPY | Facility: CLINIC | Age: 77
End: 2022-02-09

## 2022-02-09 DIAGNOSIS — M54.50 LOW BACK PAIN, UNSPECIFIED BACK PAIN LATERALITY, UNSPECIFIED CHRONICITY, UNSPECIFIED WHETHER SCIATICA PRESENT: ICD-10-CM

## 2022-02-09 DIAGNOSIS — M25.559 PAIN, HIP: Primary | ICD-10-CM

## 2022-02-09 DIAGNOSIS — R26.9 GAIT DISTURBANCE: ICD-10-CM

## 2022-02-09 PROCEDURE — 97530 THERAPEUTIC ACTIVITIES: CPT | Performed by: PHYSICAL THERAPIST

## 2022-02-09 PROCEDURE — 97140 MANUAL THERAPY 1/> REGIONS: CPT | Performed by: PHYSICAL THERAPIST

## 2022-02-09 PROCEDURE — 97110 THERAPEUTIC EXERCISES: CPT | Performed by: PHYSICAL THERAPIST

## 2022-02-10 RX ORDER — DEXTROMETHORPHAN HYDROBROMIDE AND PROMETHAZINE HYDROCHLORIDE 15; 6.25 MG/5ML; MG/5ML
SYRUP ORAL
Qty: 118 ML | Refills: 0 | Status: SHIPPED | OUTPATIENT
Start: 2022-02-10 | End: 2022-04-08 | Stop reason: SDUPTHER

## 2022-02-11 ENCOUNTER — TREATMENT (OUTPATIENT)
Dept: PHYSICAL THERAPY | Facility: CLINIC | Age: 77
End: 2022-02-11

## 2022-02-11 DIAGNOSIS — M54.50 LOW BACK PAIN, UNSPECIFIED BACK PAIN LATERALITY, UNSPECIFIED CHRONICITY, UNSPECIFIED WHETHER SCIATICA PRESENT: ICD-10-CM

## 2022-02-11 DIAGNOSIS — R26.9 GAIT DISTURBANCE: ICD-10-CM

## 2022-02-11 DIAGNOSIS — M25.559 PAIN, HIP: Primary | ICD-10-CM

## 2022-02-11 PROCEDURE — 97140 MANUAL THERAPY 1/> REGIONS: CPT | Performed by: PHYSICAL THERAPIST

## 2022-02-11 PROCEDURE — 97110 THERAPEUTIC EXERCISES: CPT | Performed by: PHYSICAL THERAPIST

## 2022-02-11 NOTE — PROGRESS NOTES
Physical Therapy Daily Treatment Note      Patient: Radha Cruz   : 1945  Referring practitioner: Michael Dia MD  Date of Initial Visit: Type: THERAPY  Noted: 2022  Today's Date: 2022  Patient seen for 5 sessions         Radha Cruz reports: no hip pain for the first time this morning.       Subjective     Objective   See Exercise, Manual, and Modality Logs for complete treatment.       Assessment & Plan     Assessment    Assessment details: Pt tolerated session with in low back and hip pain during exercises. Pt reports decrease in pain with daily activities as well. Progress next visit as tolerated.         Visit Diagnoses:    ICD-10-CM ICD-9-CM   1. Pain, hip  M25.559 719.45   2. Low back pain, unspecified back pain laterality, unspecified chronicity, unspecified whether sciatica present  M54.50 724.2   3. Gait disturbance  R26.9 781.2       Progress strengthening /stabilization /functional activity           Timed:         Manual Therapy:    8     mins  98356;     Therapeutic Exercise:    22     mins  44800;     Neuromuscular Kiet:    0    mins  20342;    Therapeutic Activity:     0     mins  78502;     Gait Trainin     mins  79047;     Ultrasound:     0     mins  59675;    Ionto                               0    mins   24162  Self-care  __0__ mins 95736    Un-Timed:  Electrical Stimulation:    0     mins  08838 ( );  Traction     0     mins 92668  Hot pack     0     Mins    Cold pack                       0     Mins      Timed Treatment:   30   mins   Total Treatment:     30   mins    Marlena Walton PT, DPT  Physical Therapist    NPI:[id number]  [place] License: [id number]

## 2022-02-16 ENCOUNTER — TELEPHONE (OUTPATIENT)
Dept: PHYSICAL THERAPY | Facility: CLINIC | Age: 77
End: 2022-02-16

## 2022-02-18 ENCOUNTER — TREATMENT (OUTPATIENT)
Dept: PHYSICAL THERAPY | Facility: CLINIC | Age: 77
End: 2022-02-18

## 2022-02-18 DIAGNOSIS — R26.9 GAIT DISTURBANCE: ICD-10-CM

## 2022-02-18 DIAGNOSIS — M54.50 LOW BACK PAIN, UNSPECIFIED BACK PAIN LATERALITY, UNSPECIFIED CHRONICITY, UNSPECIFIED WHETHER SCIATICA PRESENT: ICD-10-CM

## 2022-02-18 DIAGNOSIS — M25.559 PAIN, HIP: Primary | ICD-10-CM

## 2022-02-18 PROCEDURE — 97110 THERAPEUTIC EXERCISES: CPT | Performed by: PHYSICAL THERAPIST

## 2022-02-18 NOTE — PROGRESS NOTES
PT Discharge Note       Patient: Radha Cruz   : 1945  Diagnosis/ICD-10 Code:  Pain, hip [M25.559]  Referring practitioner: Michael Dia MD  Date of Initial Visit: Type: THERAPY  Noted: 2022  Today's Date: 2022  Patient seen for 6 sessions    Progress note due: 3/20/2022  Re-cert due: 2022    Subjective:     Subjective Questionnaire: LEFS: 70/80  Clinical Progress: improved  Home Program Compliance: Yes  Treatment has included: therapeutic exercise and manual therapy    Subjective Evaluation    History of Present Illness  Mechanism of injury: Reports her hip is doing much better. She still has pain but says its hardly bothersome. It does not affect her daily function.          Objective   Strength/Myotome Testing      Left Hip   Planes of Motion   Flexion: 4+  Extension: 4+  Abduction: 4+     Right Hip   Planes of Motion   Flexion: 4+  Extension: 4+  Abduction: 4+     Left Knee   Flexion: 4+  Extension: 5     Right Knee   Flexion: 5  Extension: 5  Assessment & Plan     Assessment    Assessment details: Pt has reached majority of goals and demonstrates improvement in bilateral hip and knee strength and has less pain with daily activities. Pt demonstrates independence with HEP and will continue with exercises program at home to maintain progress made in therapy.     Goals  Plan Goals: 1. The patient complains of R hip pain.              LTG 1: 6 weeks:  The patient will report a pain rating of 2/10 or better in order to improve  tolerance to activities of daily living and improve sleep quality.                          STATUS:  Not met               STG 1a: 3 weeks:  The patient will report a pain rating of 4/10 or better.                          STATUS:  Met     2. The patient demonstrates weakness of the B hip.              LTG 2: 8 weeks:  The patient will demonstrate 4+/5 strength for B hip flexion, abduction,  and extension in order to improve hip stability.                           STATUS:  met              STG 2a: 4 weeks:  The patient will demonstrate 4/5 strength for B hip flexion, abduction,  and extension.                          STATUS:  met                3. The patient has gait dysfunction.  LTG 3: 8 weeks:  The patient will ambulate without assistive device, independently, for community distances with minimal limp to the R lower extremity in order to improve mobility and allow patient to perform activities such as grocery shopping with greater ease.  STATUS:  met  STG 4a: The patient will be independent in [facility].  STATUS:  met  TREATMENT: Gait training, aquatic therapy, therapeutic exercise, and home exercise instruction.    4. Mobility: Walking/Moving Around Functional Limitation                   LTG 4: 8 weeks:  The patient will demonstrate LEFS score to 75/80 to decrease functional limitations.   STATUS:  Not met             Visit Diagnoses:    ICD-10-CM ICD-9-CM   1. Pain, hip  M25.559 719.45   2. Low back pain, unspecified back pain laterality, unspecified chronicity, unspecified whether sciatica present  M54.50 724.2   3. Gait disturbance  R26.9 781.2       Progress toward previous goals: All Met      PT Signature: Marlena Walton, PT, DPT          Based upon review of the patient's progress and continued therapy plan, it is my medical opinion that Radha Cruz should continue physical therapy treatment at [facility]  [address] [place] [id number] [phone].  Signature:  Michael Dia MD  NPI: [id number]      Timed:         Manual Therapy:    0     mins  65227;     Therapeutic Exercise:    30     mins  10036;     Neuromuscular Kiet:    0    mins  12649;    Therapeutic Activity:     0     mins  25738;     Gait Trainin     mins  74935;     Ultrasound:     0     mins  50035;    Ionto                               0    mins   51838  Self-care  __0__ mins 41054    Un-Timed:  Electrical  Stimulation:    0     mins  95491 ( );  Traction     0     mins 60421  Re- Eval     5     Mins     Hot pack     0     Mins    Cold pack                       0     Mins      Timed Treatment:   30   mins   Total Treatment:     35   mins

## 2022-02-23 DIAGNOSIS — I10 BENIGN ESSENTIAL HYPERTENSION: ICD-10-CM

## 2022-02-23 RX ORDER — POTASSIUM CHLORIDE 20MEQ/15ML
LIQUID (ML) ORAL
Qty: 480 ML | Refills: 0 | Status: SHIPPED | OUTPATIENT
Start: 2022-02-23 | End: 2022-06-14 | Stop reason: SDUPTHER

## 2022-03-04 DIAGNOSIS — Z91.09 ENVIRONMENTAL ALLERGIES: ICD-10-CM

## 2022-03-04 DIAGNOSIS — R00.2 PALPITATIONS: ICD-10-CM

## 2022-03-04 RX ORDER — METOPROLOL SUCCINATE 100 MG/1
TABLET, EXTENDED RELEASE ORAL
Qty: 90 TABLET | Refills: 0 | Status: SHIPPED | OUTPATIENT
Start: 2022-03-04 | End: 2022-05-29

## 2022-03-04 RX ORDER — CLOPIDOGREL BISULFATE 75 MG/1
TABLET ORAL
Qty: 90 TABLET | Refills: 0 | Status: SHIPPED | OUTPATIENT
Start: 2022-03-04 | End: 2022-05-29

## 2022-03-04 RX ORDER — MONTELUKAST SODIUM 10 MG/1
TABLET ORAL
Qty: 90 TABLET | Refills: 0 | Status: SHIPPED | OUTPATIENT
Start: 2022-03-04 | End: 2022-06-02

## 2022-03-15 RX ORDER — LOSARTAN POTASSIUM 100 MG/1
TABLET ORAL
Qty: 90 TABLET | Refills: 0 | Status: SHIPPED | OUTPATIENT
Start: 2022-03-15 | End: 2022-06-22

## 2022-03-25 DIAGNOSIS — I10 BENIGN ESSENTIAL HYPERTENSION: ICD-10-CM

## 2022-03-25 RX ORDER — POTASSIUM CHLORIDE 20MEQ/15ML
LIQUID (ML) ORAL
Qty: 480 ML | Refills: 0 | OUTPATIENT
Start: 2022-03-25

## 2022-03-31 RX ORDER — DEXTROMETHORPHAN HYDROBROMIDE AND PROMETHAZINE HYDROCHLORIDE 15; 6.25 MG/5ML; MG/5ML
SYRUP ORAL
Qty: 118 ML | Refills: 0 | OUTPATIENT
Start: 2022-03-31

## 2022-04-01 RX ORDER — DEXTROMETHORPHAN HYDROBROMIDE AND PROMETHAZINE HYDROCHLORIDE 15; 6.25 MG/5ML; MG/5ML
SYRUP ORAL
Qty: 118 ML | Refills: 0 | OUTPATIENT
Start: 2022-04-01

## 2022-04-07 DIAGNOSIS — E78.5 DYSLIPIDEMIA: ICD-10-CM

## 2022-04-07 RX ORDER — ATORVASTATIN CALCIUM 40 MG/1
TABLET, FILM COATED ORAL
Qty: 90 TABLET | Refills: 0 | Status: SHIPPED | OUTPATIENT
Start: 2022-04-07 | End: 2022-07-15

## 2022-04-08 ENCOUNTER — OFFICE VISIT (OUTPATIENT)
Dept: FAMILY MEDICINE CLINIC | Facility: CLINIC | Age: 77
End: 2022-04-08

## 2022-04-08 VITALS
WEIGHT: 164 LBS | HEART RATE: 60 BPM | HEIGHT: 65 IN | OXYGEN SATURATION: 95 % | DIASTOLIC BLOOD PRESSURE: 68 MMHG | SYSTOLIC BLOOD PRESSURE: 150 MMHG | BODY MASS INDEX: 27.32 KG/M2

## 2022-04-08 DIAGNOSIS — I10 BENIGN ESSENTIAL HYPERTENSION: ICD-10-CM

## 2022-04-08 DIAGNOSIS — R21 RASH OF BODY: ICD-10-CM

## 2022-04-08 DIAGNOSIS — E87.6 HYPOKALEMIA: ICD-10-CM

## 2022-04-08 DIAGNOSIS — R79.0 LOW MAGNESIUM LEVEL: ICD-10-CM

## 2022-04-08 DIAGNOSIS — E78.5 DYSLIPIDEMIA: ICD-10-CM

## 2022-04-08 DIAGNOSIS — E11.29 TYPE 2 DIABETES MELLITUS WITH MICROALBUMINURIA, WITHOUT LONG-TERM CURRENT USE OF INSULIN: ICD-10-CM

## 2022-04-08 DIAGNOSIS — R05.9 COUGH: ICD-10-CM

## 2022-04-08 DIAGNOSIS — R80.9 TYPE 2 DIABETES MELLITUS WITH MICROALBUMINURIA, WITHOUT LONG-TERM CURRENT USE OF INSULIN: ICD-10-CM

## 2022-04-08 DIAGNOSIS — J30.2 SEASONAL ALLERGIC RHINITIS, UNSPECIFIED TRIGGER: Primary | ICD-10-CM

## 2022-04-08 PROCEDURE — 99214 OFFICE O/P EST MOD 30 MIN: CPT

## 2022-04-08 RX ORDER — POTASSIUM CHLORIDE 20MEQ/15ML
LIQUID (ML) ORAL
COMMUNITY
Start: 2022-02-24 | End: 2022-04-08 | Stop reason: SDUPTHER

## 2022-04-08 RX ORDER — POTASSIUM CHLORIDE 20MEQ/15ML
20 LIQUID (ML) ORAL DAILY
Qty: 500 ML | Refills: 0 | Status: SHIPPED | OUTPATIENT
Start: 2022-04-08 | End: 2022-06-14 | Stop reason: SDUPTHER

## 2022-04-08 RX ORDER — DEXTROMETHORPHAN HYDROBROMIDE AND PROMETHAZINE HYDROCHLORIDE 15; 6.25 MG/5ML; MG/5ML
5 SYRUP ORAL 4 TIMES DAILY PRN
Qty: 118 ML | Refills: 0 | Status: SHIPPED | OUTPATIENT
Start: 2022-04-08 | End: 2022-05-05

## 2022-04-08 RX ORDER — DIAPER,BRIEF,INFANT-TODD,DISP
1 EACH MISCELLANEOUS 2 TIMES DAILY
Qty: 30 G | Refills: 0 | Status: SHIPPED | OUTPATIENT
Start: 2022-04-08 | End: 2022-04-22

## 2022-04-08 NOTE — PROGRESS NOTES
Subjective   Radha Cruz is a 76 y.o. female. Who presents for a routine follow up and allergies    History of Present Illness     HTN  Checks at home - cannot give me numbers - states usually high when allergy symptoms  No CP, Dizziness. Does get occasional headaches and maikol lower ext edema.  Not watching diet but does limit salt.     DM2  Takes metformin  Does not watch sugars. Likes sweets  Denies neuropathy, dizziness, polyuria, polydipsia.    Rash on left breast x2 weeks - states had a bra wire poking her and then noticed rash-  getting better. Itching.     Allergies : wheezing, rhinorrhea. States hx. Needs medication refill. No SOA, does have cough occasionally that is bothersome and interrupts sleep. No fever, chills, congestion    Gained weight. Not active. Eating more thing used not to take. Clothes tight.   No exercise.     Hx hypokalemia and hypomagnesemia- on supplements. Labs due -needs refills  The following portions of the patient's history were reviewed and updated as appropriate: allergies, current medications, past family history, past medical history, past social history and past surgical history.    Review of Systems   Constitutional: Negative for chills, fatigue and fever.   HENT: Positive for rhinorrhea. Negative for congestion, sinus pressure and sore throat.    Eyes: Negative for visual disturbance.   Respiratory: Positive for wheezing (occassional with allergy). Negative for cough, chest tightness and shortness of breath.    Cardiovascular: Negative.    Gastrointestinal: Negative for constipation and diarrhea.   Endocrine: Negative for polydipsia, polyphagia and polyuria.   Genitourinary: Negative for decreased urine volume and difficulty urinating.   Musculoskeletal: Positive for arthralgias (hip pain- states improved ).   Skin: Positive for rash.   Allergic/Immunologic: Positive for environmental allergies.   Neurological: Negative for headache.   Psychiatric/Behavioral:  "Negative for sleep disturbance and depressed mood. The patient is not nervous/anxious.        Objective    /68   Pulse 60   Ht 165.1 cm (65\")   Wt 74.4 kg (164 lb)   SpO2 95%   BMI 27.29 kg/m²     Physical Exam  Constitutional:       Appearance: Normal appearance. She is not ill-appearing.   HENT:      Nose: No congestion or rhinorrhea.   Eyes:      Conjunctiva/sclera: Conjunctivae normal.   Neck:      Thyroid: No thyroid mass, thyromegaly or thyroid tenderness.      Vascular: No carotid bruit.   Cardiovascular:      Rate and Rhythm: Normal rate and regular rhythm.      Pulses: Normal pulses.           Carotid pulses are 2+ on the right side and 2+ on the left side.     Heart sounds: Normal heart sounds, S1 normal and S2 normal. No murmur heard.  Pulmonary:      Effort: Pulmonary effort is normal. No respiratory distress.      Breath sounds: Normal breath sounds.   Musculoskeletal:      Right lower leg: No edema.      Left lower leg: No edema.   Skin:         Neurological:      General: No focal deficit present.      Mental Status: She is alert and oriented to person, place, and time.   Psychiatric:         Attention and Perception: Attention normal.         Mood and Affect: Mood normal.         Speech: Speech normal.         Behavior: Behavior normal.         Thought Content: Thought content normal.         Cognition and Memory: Cognition normal.         Judgment: Judgment normal.           Assessment/Plan   Diagnoses and all orders for this visit:    1. Seasonal allergic rhinitis, unspecified trigger (Primary)  -     Cetirizine HCl 10 MG capsule; Take 10 mg by mouth Daily.  Dispense: 90 capsule; Refill: 0    2. Dyslipidemia  -     Lipid Panel    3. Benign essential hypertension  -     CBC & Differential  -     Comprehensive Metabolic Panel  -     TSH  -     BP elevated today and seems same at home. Advised to take BP daily and bring log to next appointment. Proper BP measurement explained:  sitting for " 5-10 mins prior, sitting with back straight and arm supported at heart level, placing cuff on skin and not over clothing, getting measurements around same time every day and recording these.    -     Limit salt and sweets    4. Type 2 diabetes mellitus with microalbuminuria, without long-term current use of insulin (HCC)  -     Hemoglobin A1c  -     Microalbumin / Creatinine Urine Ratio - Urine, Clean Catch  -     TSH  -     Last A1c 5.8 - Will update labs and advise further based on results. Cont current tx.   -     Advised healthy diet.  Limit salt, sugar and fatty foods.  Add exercise to your routine.  Recommendation is 150 minutes of moderate exercise per week if you cannot do that, at least walk 30 minutes 3-4 times per week.     5. Rash of body  -     hydrocortisone 1 % cream; Apply 1 application topically to the appropriate area as directed 2 (Two) Times a Day.  Dispense: 30 g; Refill: 0    6. Hypokalemia  -     potassium chloride (KAYCIEL) 20 mEq/15 mL solution; Take 15 mL by mouth Daily.  Dispense: 500 mL; Refill: 0    7. Low magnesium level  -     Magnesium    8. Cough  -     promethazine-dextromethorphan (PROMETHAZINE-DM) 6.25-15 MG/5ML syrup; Take 5 mL by mouth 4 (Four) Times a Day As Needed for Cough.  Dispense: 118 mL; Refill: 0  -     Think due to allergies. Will tx symptomatically as interrupting sleep.     RTC in 2-4 weeks to follow up on BP      - Pt agrees with plan of care and states no further concerns or questions today    This document is intended for medical expert use only. Reading of this document by patients and/or patient's family without participating medical staff guidance may result in misinterpretation and unintended morbidity.  Any interpretation of such data is the responsibility of the patient and/or family member responsible for the patient in concert with their primary or specialist providers, not to be left for sources of online searches such as [employer], [employer] or similar queries.  Relying on these approaches to knowledge may result in misinterpretation, misguided goals of care and even death should patients or family members try recommendations outside of the realm of professional medical care in a supervised way.     Please allow 3-5 business days for recommendations based on new results     Go to the ER for any possible lifethreatening symptoms such as chest pain or shortness of air.      I personally spent 30 minutes with this patient, preparing for the visit, reviewing tests, obtaining and/or reviewing a separately obtained history, performing a medically appropriate examination and/or evaluation , counseling and educating the patient/family/caregiver, ordering medications, tests, or procedures, documenting information in the medical record and independently interpreting results.

## 2022-04-12 LAB
ALBUMIN SERPL-MCNC: 3.7 G/DL (ref 3.7–4.7)
ALBUMIN/CREAT UR: 165 MG/G CREAT (ref 0–29)
ALBUMIN/GLOB SERPL: 0.9 {RATIO} (ref 1.2–2.2)
ALP SERPL-CCNC: 75 IU/L (ref 44–121)
ALT SERPL-CCNC: 28 IU/L (ref 0–32)
AST SERPL-CCNC: 27 IU/L (ref 0–40)
BASOPHILS # BLD AUTO: 0.1 X10E3/UL (ref 0–0.2)
BASOPHILS NFR BLD AUTO: 1 %
BILIRUB SERPL-MCNC: 0.5 MG/DL (ref 0–1.2)
BUN SERPL-MCNC: 10 MG/DL (ref 8–27)
BUN/CREAT SERPL: 12 (ref 12–28)
CALCIUM SERPL-MCNC: 9.2 MG/DL (ref 8.7–10.3)
CHLORIDE SERPL-SCNC: 103 MMOL/L (ref 96–106)
CHOLEST SERPL-MCNC: 85 MG/DL (ref 100–199)
CO2 SERPL-SCNC: 23 MMOL/L (ref 20–29)
CREAT SERPL-MCNC: 0.81 MG/DL (ref 0.57–1)
CREAT UR-MCNC: 75.7 MG/DL
EGFRCR SERPLBLD CKD-EPI 2021: 76 ML/MIN/1.73
EOSINOPHIL # BLD AUTO: 0.5 X10E3/UL (ref 0–0.4)
EOSINOPHIL NFR BLD AUTO: 7 %
ERYTHROCYTE [DISTWIDTH] IN BLOOD BY AUTOMATED COUNT: 14 % (ref 11.7–15.4)
GLOBULIN SER CALC-MCNC: 4.1 G/DL (ref 1.5–4.5)
GLUCOSE SERPL-MCNC: 141 MG/DL (ref 65–99)
HBA1C MFR BLD: 6.6 % (ref 4.8–5.6)
HCT VFR BLD AUTO: 40.2 % (ref 34–46.6)
HDLC SERPL-MCNC: 29 MG/DL
HGB BLD-MCNC: 13.5 G/DL (ref 11.1–15.9)
IMM GRANULOCYTES # BLD AUTO: 0 X10E3/UL (ref 0–0.1)
IMM GRANULOCYTES NFR BLD AUTO: 0 %
LDLC SERPL CALC-MCNC: 37 MG/DL (ref 0–99)
LYMPHOCYTES # BLD AUTO: 2.1 X10E3/UL (ref 0.7–3.1)
LYMPHOCYTES NFR BLD AUTO: 28 %
MAGNESIUM SERPL-MCNC: 1.5 MG/DL (ref 1.6–2.3)
MCH RBC QN AUTO: 30.4 PG (ref 26.6–33)
MCHC RBC AUTO-ENTMCNC: 33.6 G/DL (ref 31.5–35.7)
MCV RBC AUTO: 91 FL (ref 79–97)
MICROALBUMIN UR-MCNC: 125 UG/ML
MONOCYTES # BLD AUTO: 0.7 X10E3/UL (ref 0.1–0.9)
MONOCYTES NFR BLD AUTO: 9 %
NEUTROPHILS # BLD AUTO: 4.1 X10E3/UL (ref 1.4–7)
NEUTROPHILS NFR BLD AUTO: 55 %
PLATELET # BLD AUTO: 208 X10E3/UL (ref 150–450)
POTASSIUM SERPL-SCNC: 4.7 MMOL/L (ref 3.5–5.2)
PROT SERPL-MCNC: 7.8 G/DL (ref 6–8.5)
RBC # BLD AUTO: 4.44 X10E6/UL (ref 3.77–5.28)
SODIUM SERPL-SCNC: 142 MMOL/L (ref 134–144)
TRIGL SERPL-MCNC: 97 MG/DL (ref 0–149)
TSH SERPL DL<=0.005 MIU/L-ACNC: 1.22 UIU/ML (ref 0.45–4.5)
VLDLC SERPL CALC-MCNC: 19 MG/DL (ref 5–40)
WBC # BLD AUTO: 7.5 X10E3/UL (ref 3.4–10.8)

## 2022-04-13 NOTE — PROGRESS NOTES
APPT INFO    Date /Time: 3/26/19 11:30AM   Reason for Appt: Headaches    Ref Provider/Clinic: [facility]    Are there internal records? Yes/No?  IF YES, list clinic names: NO   Are there outside records? Yes/No? YES - see below   Patient Contact (Y/N) & Call Details: NO - referral   Action: Reviewed records      OUTSIDE RECORDS CHECKLIST     CLINIC NAME COMMENTS REC (x) IMG (x)   [facility]   x            Records Received From: [facility]     Date/Exam/Location  (specify location if different)   Office Notes: 2/20/18      Please inform pt of lab results.   Mild increase in A1c. Is she taking her metformin twice a day? If not start, if does take it, increase to 2000 mg daily (2 tabs in am and 2 tabs in afternoon). Also work on healthy diet and exercise and limit sweets, salt and fatty foods.   Also decrease in kidney function. Good diabetes and blood pressure control very important. Start checking Blood sugars and blood pressure and bring to next OV.     Magnesium slightly low. Take daily magnesium 400 mg and recheck in 2 weeks.    Follow up with me or david in 3 months and bring blood sugar and blood pressure logs. Call if issues or concerns.

## 2022-04-18 RX ORDER — FELODIPINE 10 MG/1
TABLET, EXTENDED RELEASE ORAL
Qty: 90 TABLET | Refills: 0 | Status: SHIPPED | OUTPATIENT
Start: 2022-04-18 | End: 2022-07-15

## 2022-04-21 NOTE — PROGRESS NOTES
"Physical Therapy Daily Progress Note        Patient: Radha Cruz   : 1945  Diagnosis/ICD-10 Code:  Pain, hip [M25.559]  Referring practitioner: Michael Dia MD  Date of Initial Visit: No linked episodes  Today's Date: 2022  Patient seen for Visit count could not be calculated. Make sure you are using a visit which is associated with an episode. sessions             Subjective   Radha Cruz reports having same pain right lower back and hip- rating 7/10 upon arrival.  She does report having relief after \"Hot shower\" and stretches.    Objective       Hip Abductor Strength:    Left:  4-/5  Right:  4/5    See Exercise and  Manual Logs for complete treatment.       Assessment/Plan     Pt tolerated therapy session well - with progression of therapeutic exercises, CKC-Functional activities, and Manual therapy. She has improved, but continues to have deficits in Trunk and Bilateral Lower Extremity ROM,  Strength, and Stability; limiting function and ability to perform ADLs at this time. Good response to Manual stretching today; as evidenced by pt reporting less pain and tightness post session.    Progress per Plan of Care           Timed:  Manual Therapy:    8     mins  84704;  Therapeutic Exercise:    20     mins  49540;     Neuromuscular Kiet:    0    mins  02437;    Therapeutic Activity:     10     mins  34211;     Gait Trainin     mins  97159;     Ultrasound:     0     mins  62323;    Electrical Stimulation:    0     mins  78592;  Iontophoresis     0     mins  93646    Untimed:  Electrical Stimulation:    0     mins  37617 ( );  Mechanical Traction:    0     mins  55840;   Fluidotherapy     0     mins  68753  Hot pack     0     mins  49520  Cold pack     0     mins  17365    Timed Treatment:   38   mins   Total Treatment:     38   mins        Juanita Umaña PTA  Physical Therapist Assistant  "
no

## 2022-04-22 ENCOUNTER — TELEPHONE (OUTPATIENT)
Dept: FAMILY MEDICINE CLINIC | Facility: CLINIC | Age: 77
End: 2022-04-22

## 2022-04-22 ENCOUNTER — OFFICE VISIT (OUTPATIENT)
Dept: FAMILY MEDICINE CLINIC | Facility: CLINIC | Age: 77
End: 2022-04-22

## 2022-04-22 VITALS
SYSTOLIC BLOOD PRESSURE: 154 MMHG | DIASTOLIC BLOOD PRESSURE: 76 MMHG | HEIGHT: 65 IN | HEART RATE: 64 BPM | OXYGEN SATURATION: 95 % | BODY MASS INDEX: 27.16 KG/M2 | WEIGHT: 163 LBS

## 2022-04-22 DIAGNOSIS — E11.65 TYPE 2 DIABETES MELLITUS WITH HYPERGLYCEMIA, WITHOUT LONG-TERM CURRENT USE OF INSULIN: ICD-10-CM

## 2022-04-22 DIAGNOSIS — R79.0 LOW MAGNESIUM LEVEL: ICD-10-CM

## 2022-04-22 DIAGNOSIS — I10 BENIGN ESSENTIAL HYPERTENSION: Primary | ICD-10-CM

## 2022-04-22 DIAGNOSIS — B36.9 FUNGAL INFECTION OF SKIN: ICD-10-CM

## 2022-04-22 PROCEDURE — 99214 OFFICE O/P EST MOD 30 MIN: CPT

## 2022-04-22 RX ORDER — NYSTATIN 100000 U/G
1 OINTMENT TOPICAL 2 TIMES DAILY
Qty: 30 G | Refills: 0 | Status: SHIPPED | OUTPATIENT
Start: 2022-04-22 | End: 2022-05-05

## 2022-04-22 RX ORDER — HYDROCHLOROTHIAZIDE 25 MG/1
25 TABLET ORAL DAILY
Qty: 30 TABLET | Refills: 0 | Status: SHIPPED | OUTPATIENT
Start: 2022-04-22 | End: 2022-05-13

## 2022-04-22 RX ORDER — AMLODIPINE BESYLATE 5 MG/1
5 TABLET ORAL DAILY
Qty: 30 TABLET | Refills: 0 | Status: SHIPPED | OUTPATIENT
Start: 2022-04-22 | End: 2022-04-22

## 2022-04-22 NOTE — TELEPHONE ENCOUNTER
Caller: [facility] [place], [place] - [address] - [phone] [facility] [phone] FX    Relationship: Pharmacy    Best call back number: [phone]    PHARMACY CALLED STATING THAT THEY NEED CLARIFICATION ON WHICH MEDICATION TO FILL FOR THE PATIENT.   04/18/22    felodipine (PLENDIL) 10 MG 24 hr tablet   WAS CALLED IN..... TODAY, 04/22/22 A SCRIPT FOR AMLODIPINE WAS CALLED IN AND THEY DON'T KNOW WHICH TO FILL    PLEASE CALL AND ADVISE

## 2022-04-22 NOTE — PROGRESS NOTES
"Subjective   Radha Cruz is a 76 y.o. female. Who presents for a follow up on BP & DM2      History of Present Illness     HTN  BP elevated today  A home 140-160s  States compliance with medications.   No lower ext edema, headache, dizziness, palpitations, vision changes    started magnesium as instructed. Needs recheck today - states pills too big and wonders if can stop taking    Has started working on diet- Mostly salads at night. Tries to eat more fruits and limit sugary foods. Doesn't like red meat.      DM2- Metformin giving her abd pain- did start increased dose of 2000 mg/day but could not tolerate and has been taking One tab BID x2 days.   Plans to start walking and doing more yard work.     Rash   Still has same rash on left shoulder area but larger now- and developed similar rash under MAY breasts.   Denies itching, pain, fever.   States keeps bra on at all times.     The following portions of the patient's history were reviewed and updated as appropriate: allergies, current medications, past family history, past medical history, past social history and past surgical history.    Review of Systems   Constitutional: Negative for chills, fatigue and fever.   HENT: Positive for rhinorrhea. Negative for congestion, sinus pressure and sore throat.    Respiratory: Positive for cough. Negative for shortness of breath and wheezing.    Cardiovascular: Negative.    Gastrointestinal: Negative for abdominal pain and constipation.   Genitourinary: Negative for difficulty urinating.   Psychiatric/Behavioral: Negative for sleep disturbance.       Objective    /76   Pulse 64   Ht 165.1 cm (65\")   Wt 73.9 kg (163 lb)   SpO2 95%   BMI 27.12 kg/m²     Physical Exam  Constitutional:       Appearance: She is not ill-appearing.   HENT:      Right Ear: Tympanic membrane, ear canal and external ear normal. There is no impacted cerumen.      Left Ear: Tympanic membrane, ear canal and external ear normal. " There is no impacted cerumen.      Nose: Nose normal. No congestion or rhinorrhea.      Mouth/Throat:      Mouth: Mucous membranes are moist.      Pharynx: Oropharynx is clear. No oropharyngeal exudate or posterior oropharyngeal erythema.   Eyes:      Conjunctiva/sclera: Conjunctivae normal.   Cardiovascular:      Rate and Rhythm: Normal rate and regular rhythm.      Pulses: Normal pulses.      Heart sounds: Normal heart sounds, S1 normal and S2 normal. No murmur heard.  Pulmonary:      Effort: Pulmonary effort is normal. No respiratory distress.      Breath sounds: Normal breath sounds.   Skin:         Neurological:      General: No focal deficit present.      Mental Status: She is alert and oriented to person, place, and time.   Psychiatric:         Attention and Perception: Attention normal.         Mood and Affect: Mood normal.         Speech: Speech normal.         Behavior: Behavior normal.         Thought Content: Thought content normal.         Cognition and Memory: Cognition normal.         Judgment: Judgment normal.       Assessment/Plan   Diagnoses and all orders for this visit:    1. Benign essential hypertension (Primary)  -     BP still uncontrolled. Discussed risks of untreated high BP. Will add HCTZ to see if helps. Use and SE discussed. Listed under allergies- pt states long time ago and was in combo with lisinopril- states willing to try - will call if issues.   -    Advised to monitor BP at home and call if low or high numbers, Goal <130/80.  -    Work on a healthy diet.  Limit salt, sugar and fatty foods.  Add exercise to your routine.  Recommendation is 150 minutes of moderate exercise per week if you cannot do that, at least walk 30 minutes 3-4 times per week.     2. Fungal infection of skin  -     nystatin (MYCOSTATIN) 329874 UNIT/GM ointment; Apply 1 application topically to the appropriate area as directed 2 (Two) Times a Day.  Dispense: 30 g; Refill: 0  -     Think yeast infection as not  typical appearance. Advised to keep bra off when at home and at bed time. Keep area clean and dry. Use of medication discussed.     3. Low magnesium level  -     Magnesium    4. Type 2 diabetes mellitus with hyperglycemia, without long-term current use of insulin (HCC)       -    A1c 6.6- elevated from 9 mo ago. Did not tolerate higher dose of metformin and would like to work on diet before adding more meds. Advised to try 1.5 tabs BID and see if tolerates.     RTC in 2 weeks for BP check, sooner as needed. Call if issues.        - Pt agrees with plan of care and states no further concerns or questions today    This document is intended for medical expert use only. Reading of this document by patients and/or patient's family without participating medical staff guidance may result in misinterpretation and unintended morbidity.  Any interpretation of such data is the responsibility of the patient and/or family member responsible for the patient in concert with their primary or specialist providers, not to be left for sources of online searches such as [employer], [employer] or similar queries. Relying on these approaches to knowledge may result in misinterpretation, misguided goals of care and even death should patients or family members try recommendations outside of the realm of professional medical care in a supervised way.     Please allow 3-5 business days for recommendations based on new results     Go to the ER for any possible lifethreatening symptoms such as chest pain or shortness of air.      I personally spent 25 minutes with this patient, preparing for the visit, reviewing tests, obtaining and/or reviewing a separately obtained history, performing a medically appropriate examination and/or evaluation , counseling and educating the patient/family/caregiver, ordering medications, tests, or procedures, documenting information in the medical record and independently interpreting results.

## 2022-04-22 NOTE — TELEPHONE ENCOUNTER
Sorry that was an error from my part. I have sent in hydrochlothiazide instead, please inform pt of this and tell her this is the water pill that we discussed and to take in am. Call if issues.

## 2022-04-23 LAB — MAGNESIUM SERPL-MCNC: 1.8 MG/DL (ref 1.6–2.3)

## 2022-05-05 DIAGNOSIS — R05.9 COUGH: ICD-10-CM

## 2022-05-05 DIAGNOSIS — B36.9 FUNGAL INFECTION OF SKIN: ICD-10-CM

## 2022-05-05 RX ORDER — NYSTATIN 100000 U/G
OINTMENT TOPICAL
Qty: 30 G | Refills: 0 | Status: SHIPPED | OUTPATIENT
Start: 2022-05-05 | End: 2022-06-02

## 2022-05-05 RX ORDER — DEXTROMETHORPHAN HYDROBROMIDE AND PROMETHAZINE HYDROCHLORIDE 15; 6.25 MG/5ML; MG/5ML
SYRUP ORAL
Qty: 118 ML | Refills: 0 | Status: SHIPPED | OUTPATIENT
Start: 2022-05-05 | End: 2022-06-22

## 2022-05-12 DIAGNOSIS — I10 BENIGN ESSENTIAL HYPERTENSION: ICD-10-CM

## 2022-05-13 ENCOUNTER — OFFICE VISIT (OUTPATIENT)
Dept: FAMILY MEDICINE CLINIC | Facility: CLINIC | Age: 77
End: 2022-05-13

## 2022-05-13 VITALS
HEART RATE: 63 BPM | OXYGEN SATURATION: 97 % | DIASTOLIC BLOOD PRESSURE: 70 MMHG | HEIGHT: 65 IN | SYSTOLIC BLOOD PRESSURE: 142 MMHG | WEIGHT: 163 LBS | BODY MASS INDEX: 27.16 KG/M2

## 2022-05-13 DIAGNOSIS — N34.1 NONSPECIFIC URETHRITIS: ICD-10-CM

## 2022-05-13 DIAGNOSIS — I10 BENIGN ESSENTIAL HYPERTENSION: Primary | ICD-10-CM

## 2022-05-13 DIAGNOSIS — N89.8 VAGINAL ITCHING: ICD-10-CM

## 2022-05-13 DIAGNOSIS — B36.9 FUNGAL INFECTION OF SKIN: ICD-10-CM

## 2022-05-13 DIAGNOSIS — M25.551 HIP PAIN, CHRONIC, RIGHT: ICD-10-CM

## 2022-05-13 DIAGNOSIS — R30.0 DYSURIA: ICD-10-CM

## 2022-05-13 DIAGNOSIS — G89.29 HIP PAIN, CHRONIC, RIGHT: ICD-10-CM

## 2022-05-13 PROCEDURE — 99214 OFFICE O/P EST MOD 30 MIN: CPT

## 2022-05-13 RX ORDER — HYDROCHLOROTHIAZIDE 50 MG/1
25 TABLET ORAL DAILY
Qty: 30 TABLET | Refills: 0 | Status: SHIPPED | OUTPATIENT
Start: 2022-05-13 | End: 2022-06-08

## 2022-05-13 RX ORDER — HYDROCHLOROTHIAZIDE 25 MG/1
TABLET ORAL
Qty: 30 TABLET | Refills: 0 | Status: SHIPPED | OUTPATIENT
Start: 2022-05-13 | End: 2022-05-13

## 2022-05-13 NOTE — PROGRESS NOTES
"Subjective   Radha Cruz is a 76 y.o. female. Who presents for a BP follow up     History of Present Illness     HTN  Bp improved but not at goal  States compliance with medications  No headache, chest pain ,lower ext edema  Not checking BP at home    R hip pain  chronic problem  Pain radiates down leg  Hx trochanteric bursitis. Was doing well with PT but after finished flared up in February and has been having intermittent pain since.   When stands up for long time feels numb.   Does stretches, and aleve occasionally- helps some. Tylenol arthritis does not help.   Has tried po steroids in past with little relief.   Denies pain today    Thinks has UTI   Itching in labia, mild dysuria, urgency and frequency  No fever, chills, flank pr pelvic pain.   Not sexually active     Fungal infection of skin  Remains discolored around bra line- improved from last OV  Has been using tx with relief    The following portions of the patient's history were reviewed and updated as appropriate: allergies, current medications, past family history, past medical history, past social history and past surgical history.    Review of Systems   Constitutional: Negative for chills, fatigue and fever.   Eyes: Negative for visual disturbance.   Respiratory: Negative.    Cardiovascular: Negative.    Genitourinary: Positive for dysuria, frequency and urgency. Negative for difficulty urinating, dyspareunia, flank pain, genital sores, hematuria, pelvic pain, pelvic pressure, vaginal bleeding, vaginal discharge and vaginal pain.   Skin: Positive for rash.   Neurological: Negative for dizziness and headache.       Objective    /70   Pulse 63   Ht 165.1 cm (65\")   Wt 73.9 kg (163 lb)   SpO2 97%   BMI 27.12 kg/m²     Physical Exam  Constitutional:       Appearance: Normal appearance. She is not ill-appearing.   Neck:      Vascular: No carotid bruit.   Cardiovascular:      Rate and Rhythm: Normal rate and regular rhythm.      " Pulses: Normal pulses.           Carotid pulses are 2+ on the right side and 2+ on the left side.     Heart sounds: Normal heart sounds, S1 normal and S2 normal. No murmur heard.  Pulmonary:      Effort: Pulmonary effort is normal. No respiratory distress.      Breath sounds: Normal breath sounds.   Musculoskeletal:      Thoracic back: No swelling, spasms, tenderness or bony tenderness. Normal range of motion.      Lumbar back: No spasms, tenderness or bony tenderness. Normal range of motion. Negative right straight leg raise test and negative left straight leg raise test.      Right hip: No tenderness or bony tenderness. Normal range of motion.      Left hip: No tenderness or bony tenderness. Normal range of motion.      Right lower leg: No edema.      Left lower leg: No edema.   Skin:         Neurological:      General: No focal deficit present.      Mental Status: She is alert and oriented to person, place, and time.   Psychiatric:         Attention and Perception: Attention normal.         Mood and Affect: Mood normal.         Speech: Speech normal.         Behavior: Behavior normal.         Thought Content: Thought content normal.         Cognition and Memory: Cognition normal.         Judgment: Judgment normal.       Assessment & Plan   Diagnoses and all orders for this visit:    1. Benign essential hypertension (Primary)  -     hydroCHLOROthiazide (HYDRODIURIL) 50 MG tablet; Take 0.5 tablets by mouth Daily.  Dispense: 30 tablet; Refill: 0  -     BP improved but not at goal. Will increase HCTZ to 50 mg daily. Use and se discussed. Take in am. Cont other medications as prescribed.   -     Again advised to monitor at home. Call if consistently >130/80. States will get BP cuff.   -     Work on a healthy diet and increase routine activity as discussed prior.    2. Dysuria  -     Cancel: POCT urinalysis dipstick, automated  -     Urinalysis With Culture If Indicated - Urine, Clean Catch- will call once results  available with further advise. Advised increase water intake and personal hygiene.     3. Vaginal itching  -     Cancel: POCT urinalysis dipstick, automated  -     NuSwab Vaginitis (VG) - Swab, Vagina    4. Nonspecific urethritis   -     NuSwab Vaginitis (VG) - Swab, Vagina    5. Fungal infection of skin        -     Seems improving. Cont nystatin ointment. Limit bra use- at least when at home and wear loose clothing.     6. Hip pain, chronic, right          -     No pain on exam today. Advised PT as helped prior- defers at this time, wants to try renee exercises, handouts given. Avoid NSAIDs. Tylenol not helpful. Ice/heat. Will call if no improvement for PT referral.     RTC in July for follow up and labs. Call sooner if questions/concerns.      - Pt agrees with plan of care and states no further concerns or questions today    This document is intended for medical expert use only. Reading of this document by patients and/or patient's family without participating medical staff guidance may result in misinterpretation and unintended morbidity.  Any interpretation of such data is the responsibility of the patient and/or family member responsible for the patient in concert with their primary or specialist providers, not to be left for sources of online searches such as [employer], [employer] or similar queries. Relying on these approaches to knowledge may result in misinterpretation, misguided goals of care and even death should patients or family members try recommendations outside of the realm of professional medical care in a supervised way.     Please allow 3-5 business days for recommendations based on new results     Go to the ER for any possible lifethreatening symptoms such as chest pain or shortness of air.      I personally spent 30 minutes with this patient, preparing for the visit, reviewing tests, obtaining and/or reviewing a separately obtained history, performing a medically appropriate examination and/or  evaluation , counseling and educating the patient/family/caregiver, ordering medications, tests, or procedures, documenting information in the medical record and independently interpreting results.

## 2022-05-15 LAB
APPEARANCE UR: CLEAR
BACTERIA #/AREA URNS HPF: NORMAL /[HPF]
BACTERIA UR CULT: NORMAL
BACTERIA UR CULT: NORMAL
BILIRUB UR QL STRIP: NEGATIVE
CASTS URNS QL MICRO: NORMAL /LPF
COLOR UR: YELLOW
EPI CELLS #/AREA URNS HPF: NORMAL /HPF (ref 0–10)
GLUCOSE UR QL STRIP: NEGATIVE
HGB UR QL STRIP: NEGATIVE
KETONES UR QL STRIP: NEGATIVE
LEUKOCYTE ESTERASE UR QL STRIP: ABNORMAL
MICRO URNS: ABNORMAL
NITRITE UR QL STRIP: NEGATIVE
PH UR STRIP: 6.5 [PH] (ref 5–7.5)
PROT UR QL STRIP: NEGATIVE
RBC #/AREA URNS HPF: NORMAL /HPF (ref 0–2)
SP GR UR STRIP: 1.02 (ref 1–1.03)
URINALYSIS REFLEX: ABNORMAL
UROBILINOGEN UR STRIP-MCNC: 0.2 MG/DL (ref 0.2–1)
WBC #/AREA URNS HPF: NORMAL /HPF (ref 0–5)

## 2022-05-17 DIAGNOSIS — B37.31 CANDIDA VAGINITIS: Primary | ICD-10-CM

## 2022-05-17 LAB
A VAGINAE DNA VAG QL NAA+PROBE: ABNORMAL SCORE
BVAB2 DNA VAG QL NAA+PROBE: ABNORMAL SCORE
C ALBICANS DNA VAG QL NAA+PROBE: POSITIVE
C GLABRATA DNA VAG QL NAA+PROBE: NEGATIVE
MEGA1 DNA VAG QL NAA+PROBE: ABNORMAL SCORE
T VAGINALIS DNA VAG QL NAA+PROBE: NEGATIVE

## 2022-05-17 RX ORDER — FLUCONAZOLE 150 MG/1
150 TABLET ORAL DAILY
Qty: 1 TABLET | Refills: 0 | Status: SHIPPED | OUTPATIENT
Start: 2022-05-17 | End: 2022-05-18

## 2022-05-17 NOTE — PROGRESS NOTES
Please call and inform pt that her urine was normal with no infection.   Her swab did show yeast infection. I have sent medication for this to her pharmacy. Is a single dose fluconazole.     Let us know if further concerns

## 2022-05-29 DIAGNOSIS — R00.2 PALPITATIONS: ICD-10-CM

## 2022-05-29 RX ORDER — METOPROLOL SUCCINATE 100 MG/1
TABLET, EXTENDED RELEASE ORAL
Qty: 90 TABLET | Refills: 0 | Status: SHIPPED | OUTPATIENT
Start: 2022-05-29 | End: 2022-08-03 | Stop reason: SDUPTHER

## 2022-05-29 RX ORDER — CLOPIDOGREL BISULFATE 75 MG/1
TABLET ORAL
Qty: 90 TABLET | Refills: 0 | Status: SHIPPED | OUTPATIENT
Start: 2022-05-29 | End: 2022-08-29 | Stop reason: SDUPTHER

## 2022-06-02 DIAGNOSIS — B36.9 FUNGAL INFECTION OF SKIN: ICD-10-CM

## 2022-06-02 DIAGNOSIS — Z91.09 ENVIRONMENTAL ALLERGIES: ICD-10-CM

## 2022-06-02 RX ORDER — NYSTATIN 100000 U/G
OINTMENT TOPICAL
Qty: 30 G | Refills: 0 | Status: SHIPPED | OUTPATIENT
Start: 2022-06-02 | End: 2022-07-15

## 2022-06-02 RX ORDER — MONTELUKAST SODIUM 10 MG/1
TABLET ORAL
Qty: 90 TABLET | Refills: 0 | Status: SHIPPED | OUTPATIENT
Start: 2022-06-02 | End: 2022-08-29 | Stop reason: SDUPTHER

## 2022-06-08 DIAGNOSIS — I10 BENIGN ESSENTIAL HYPERTENSION: ICD-10-CM

## 2022-06-08 RX ORDER — HYDROCHLOROTHIAZIDE 50 MG/1
TABLET ORAL
Qty: 30 TABLET | Refills: 0 | Status: SHIPPED | OUTPATIENT
Start: 2022-06-08 | End: 2022-07-15

## 2022-06-10 DIAGNOSIS — I10 BENIGN ESSENTIAL HYPERTENSION: ICD-10-CM

## 2022-06-10 RX ORDER — POTASSIUM CHLORIDE 20MEQ/15ML
LIQUID (ML) ORAL
Qty: 30 ML | Refills: 0 | OUTPATIENT
Start: 2022-06-10

## 2022-06-13 NOTE — TELEPHONE ENCOUNTER
PATIENT STATES WE DECLINED THIS MED BUT SHE REALLY NEEDS IT AND COMES IN FOR AN APT 07/15.. SHE WAS IN LAST MONTH

## 2022-06-14 DIAGNOSIS — E87.6 HYPOKALEMIA: ICD-10-CM

## 2022-06-14 DIAGNOSIS — I10 BENIGN ESSENTIAL HYPERTENSION: ICD-10-CM

## 2022-06-14 RX ORDER — POTASSIUM CHLORIDE 20MEQ/15ML
20 LIQUID (ML) ORAL DAILY
Qty: 480 ML | Refills: 0 | Status: SHIPPED | OUTPATIENT
Start: 2022-06-14 | End: 2022-07-18

## 2022-06-14 RX ORDER — POTASSIUM CHLORIDE 20MEQ/15ML
20 LIQUID (ML) ORAL DAILY
Qty: 500 ML | Refills: 0 | Status: SHIPPED | OUTPATIENT
Start: 2022-06-14 | End: 2022-07-18

## 2022-06-18 DIAGNOSIS — R05.9 COUGH: ICD-10-CM

## 2022-06-19 RX ORDER — DEXTROMETHORPHAN HYDROBROMIDE AND PROMETHAZINE HYDROCHLORIDE 15; 6.25 MG/5ML; MG/5ML
SYRUP ORAL
Qty: 118 ML | Refills: 0 | OUTPATIENT
Start: 2022-06-19

## 2022-06-22 DIAGNOSIS — R05.9 COUGH: ICD-10-CM

## 2022-06-22 RX ORDER — LOSARTAN POTASSIUM 100 MG/1
TABLET ORAL
Qty: 90 TABLET | Refills: 0 | Status: SHIPPED | OUTPATIENT
Start: 2022-06-22 | End: 2022-09-13

## 2022-06-22 RX ORDER — DEXTROMETHORPHAN HYDROBROMIDE AND PROMETHAZINE HYDROCHLORIDE 15; 6.25 MG/5ML; MG/5ML
SYRUP ORAL
Qty: 118 ML | Refills: 0 | Status: SHIPPED | OUTPATIENT
Start: 2022-06-22 | End: 2022-08-04 | Stop reason: SDUPTHER

## 2022-07-15 ENCOUNTER — OFFICE VISIT (OUTPATIENT)
Dept: FAMILY MEDICINE CLINIC | Facility: CLINIC | Age: 77
End: 2022-07-15

## 2022-07-15 VITALS
HEIGHT: 65 IN | OXYGEN SATURATION: 96 % | BODY MASS INDEX: 27.49 KG/M2 | SYSTOLIC BLOOD PRESSURE: 160 MMHG | HEART RATE: 66 BPM | DIASTOLIC BLOOD PRESSURE: 72 MMHG | WEIGHT: 165 LBS

## 2022-07-15 DIAGNOSIS — M76.891 ENTHESOPATHY OF RIGHT HIP REGION: ICD-10-CM

## 2022-07-15 DIAGNOSIS — E11.9 COMPREHENSIVE DIABETIC FOOT EXAMINATION, TYPE 2 DM, ENCOUNTER FOR: ICD-10-CM

## 2022-07-15 DIAGNOSIS — I10 BENIGN ESSENTIAL HYPERTENSION: Primary | ICD-10-CM

## 2022-07-15 DIAGNOSIS — B36.9 FUNGAL INFECTION OF SKIN: ICD-10-CM

## 2022-07-15 DIAGNOSIS — E78.5 DYSLIPIDEMIA: ICD-10-CM

## 2022-07-15 DIAGNOSIS — R60.0 LOWER EXTREMITY EDEMA: ICD-10-CM

## 2022-07-15 DIAGNOSIS — B37.2 YEAST DERMATITIS: ICD-10-CM

## 2022-07-15 PROCEDURE — G0439 PPPS, SUBSEQ VISIT: HCPCS | Performed by: NURSE PRACTITIONER

## 2022-07-15 PROCEDURE — 99214 OFFICE O/P EST MOD 30 MIN: CPT | Performed by: NURSE PRACTITIONER

## 2022-07-15 PROCEDURE — 1125F AMNT PAIN NOTED PAIN PRSNT: CPT | Performed by: NURSE PRACTITIONER

## 2022-07-15 PROCEDURE — 96160 PT-FOCUSED HLTH RISK ASSMT: CPT | Performed by: NURSE PRACTITIONER

## 2022-07-15 PROCEDURE — 1159F MED LIST DOCD IN RCRD: CPT | Performed by: NURSE PRACTITIONER

## 2022-07-15 PROCEDURE — 1170F FXNL STATUS ASSESSED: CPT | Performed by: NURSE PRACTITIONER

## 2022-07-15 RX ORDER — CLOTRIMAZOLE 1 %
1 CREAM (GRAM) TOPICAL 2 TIMES DAILY
Qty: 60 G | Refills: 0 | Status: SHIPPED | OUTPATIENT
Start: 2022-07-15 | End: 2022-10-10 | Stop reason: SDUPTHER

## 2022-07-15 RX ORDER — FUROSEMIDE 20 MG/1
20 TABLET ORAL 2 TIMES DAILY
Qty: 180 TABLET | Refills: 1 | Status: SHIPPED | OUTPATIENT
Start: 2022-07-15 | End: 2022-09-07

## 2022-07-15 RX ORDER — ATORVASTATIN CALCIUM 40 MG/1
TABLET, FILM COATED ORAL
Qty: 90 TABLET | Refills: 0 | Status: SHIPPED | OUTPATIENT
Start: 2022-07-15 | End: 2022-10-21 | Stop reason: SDUPTHER

## 2022-07-15 RX ORDER — FELODIPINE 10 MG/1
TABLET, EXTENDED RELEASE ORAL
Qty: 90 TABLET | Refills: 0 | Status: SHIPPED | OUTPATIENT
Start: 2022-07-15 | End: 2022-10-10 | Stop reason: SDUPTHER

## 2022-07-15 RX ORDER — NYSTATIN 100000 U/G
OINTMENT TOPICAL
Qty: 30 G | Refills: 0 | Status: SHIPPED | OUTPATIENT
Start: 2022-07-15 | End: 2022-07-15

## 2022-07-15 RX ORDER — FLUCONAZOLE 100 MG/1
100 TABLET ORAL DAILY
Qty: 3 TABLET | Refills: 0 | Status: SHIPPED | OUTPATIENT
Start: 2022-07-15 | End: 2023-03-06

## 2022-07-15 NOTE — PROGRESS NOTES
"Chief Complaint  Annual Exam ([employer]/), Diabetes, Hypertension, and Hyperlipidemia    Subjective        Radha Cruz presents to [facility]  History of Present Illness  Reviewed labs from April and diabetes well controlled. Feet are swelling. Wears compression daily.  Blood pressure not to goal.  Still has not returned to Silver sneakers.  Is not vaccinated for the COVID  Her friend used to call her Miss Zuluaga.  Edema started with 6th pregnancy. 1980  R leg pain--went to PT. Pain with standing. Better with pushing basket at grocery. No back pain  Objective   Vital Signs:  /72   Pulse 66   Ht 165.1 cm (65\")   Wt 74.8 kg (165 lb)   SpO2 96%   BMI 27.46 kg/m²   Estimated body mass index is 27.46 kg/m² as calculated from the following:    Height as of this encounter: 165.1 cm (65\").    Weight as of this encounter: 74.8 kg (165 lb).  170/62  BMI is >= 25 and <30. (Overweight) The following options were offered after discussion;: exercise counseling/recommendations      Physical Exam  Constitutional:       Appearance: She is well-developed. She is not diaphoretic.   HENT:      Head: Normocephalic and atraumatic.   Neck:      Thyroid: No thyromegaly.   Cardiovascular:      Rate and Rhythm: Normal rate and regular rhythm.      Pulses:           Carotid pulses are 2+ on the right side and 2+ on the left side.     Heart sounds: Normal heart sounds.   Pulmonary:      Effort: Pulmonary effort is normal.      Breath sounds: Normal breath sounds.   Musculoskeletal:      Cervical back: Normal range of motion and neck supple.   Feet:      Right foot:      Protective Sensation: 7 sites sensed.      Skin integrity: Callus and dry skin present.      Left foot:      Protective Sensation: 9 sites sensed.      Skin integrity: Callus and dry skin present.   Lymphadenopathy:      Cervical: No cervical adenopathy.   Psychiatric:         Behavior: Behavior normal.         Thought " Content: Thought content normal.         Judgment: Judgment normal.        Result Review :                Assessment and Plan   Diagnoses and all orders for this visit:    1. Benign essential hypertension (Primary)    2. Lower extremity edema  -     furosemide (Lasix) 20 MG tablet; Take 1 tablet by mouth 2 (Two) Times a Day.  Dispense: 180 tablet; Refill: 1  -     Basic Metabolic Panel    3. Yeast dermatitis  -     clotrimazole (Clotrimazole AF) 1 % cream; Apply 1 application topically to the appropriate area as directed 2 (Two) Times a Day.  Dispense: 60 g; Refill: 0  -     fluconazole (Diflucan) 100 MG tablet; Take 1 tablet by mouth Daily.  Dispense: 3 tablet; Refill: 0    4. Enthesopathy of right hip region  -     Ambulatory Referral to Orthopedic Surgery    5. Comprehensive diabetic foot examination, type 2 DM, encounter for (HCC)    Hypertension-not to goal.  Rotate hydrochlorothiazide to furosemide consider changing felodipine.  With change in diuretic we will recheck potassium in 2 to 3 weeks along with blood pressure check  Edema-- changes as above- continue to use compression  Yeast dermatitis- stop nystatin rotate to clotrimazole and add Diflucan  Hip pain-can return to orthopedics       Follow Up   No follow-ups on file.  Patient was given instructions and counseling regarding her condition or for health maintenance advice. Please see specific information pulled into the AVS if appropriate.

## 2022-07-15 NOTE — PROGRESS NOTES
The ABCs of the Annual Wellness Visit  Subsequent Medicare Wellness Visit    Chief Complaint   Patient presents with   • Annual Exam     [employer] medicare     • Diabetes   • Hypertension   • Hyperlipidemia      Subjective    History of Present Illness:  Radha Cruz is a 76 y.o. female who presents for a Subsequent Medicare Wellness Visit.    The following portions of the patient's history were reviewed and   updated as appropriate: allergies, current medications, past family history, past medical history, past social history, past surgical history and problem list.    Compared to one year ago, the patient feels her physical   health is the same.    Compared to one year ago, the patient feels her mental   health is the same.    Recent Hospitalizations:  She was not admitted to the hospital during the last year.       Current Medical Providers:  Patient Care Team:  Beatriz Bernal APRN as PCP - General  Beatriz Bernal APRN as PCP - Family Medicine    Outpatient Medications Prior to Visit   Medication Sig Dispense Refill   • aspirin 81 MG chewable tablet Chew daily.     • atorvastatin (LIPITOR) 40 MG tablet TAKE 1 TABLET BY MOUTH ONCE DAILY AT NIGHT 90 tablet 0   • Cetirizine HCl 10 MG capsule Take 10 mg by mouth Daily. 90 capsule 0   • clopidogrel (PLAVIX) 75 MG tablet Take 1 tablet by mouth once daily 90 tablet 0   • felodipine (PLENDIL) 10 MG 24 hr tablet Take 1 tablet by mouth once daily 90 tablet 0   • losartan (COZAAR) 100 MG tablet Take 1 tablet by mouth once daily 90 tablet 0   • magnesium oxide (MAGOX) 400 (241.3 Mg) MG tablet tablet Take 1 tablet by mouth once daily 30 tablet 0   • meloxicam (MOBIC) 7.5 MG tablet Take 1 tablet by mouth once daily 90 tablet 0   • metFORMIN (GLUCOPHAGE) 500 MG tablet TAKE 1 TABLET BY MOUTH TWICE DAILY WITH MEALS 180 tablet 0   • metoprolol succinate XL (TOPROL-XL) 100 MG 24 hr tablet Take 1 tablet by mouth once daily 90 tablet 0   • montelukast (SINGULAIR) 10  "MG tablet TAKE 1 TABLET BY MOUTH ONCE DAILY AT NIGHT 90 tablet 0   • potassium chloride (KAYCIEL) 20 mEq/15 mL solution Take 15 mL by mouth Daily. 500 mL 0   • potassium chloride (KAYCIEL) 20 mEq/15 mL solution Take 15 mL by mouth Daily. 480 mL 0   • promethazine-dextromethorphan (PROMETHAZINE-DM) 6.25-15 MG/5ML syrup TAKE 5 ML BY MOUTH  4 TIMES DAILY AS NEEDED FOR COUGH 118 mL 0   • hydroCHLOROthiazide (HYDRODIURIL) 50 MG tablet Take 1/2 (one-half) tablet by mouth once daily 30 tablet 0   • nystatin (MYCOSTATIN) 263407 UNIT/GM ointment APPLY  OINTMENT TOPICALLY TO AFFECTED AREA TWICE DAILY 30 g 0     No facility-administered medications prior to visit.       No opioid medication identified on active medication list. I have reviewed chart for other potential  high risk medication/s and harmful drug interactions in the elderly.          Aspirin is on active medication list. Aspirin use is indicated based on review of current medical condition/s. Pros and cons of this therapy have been discussed today. Benefits of this medication outweigh potential harm.  Patient has been encouraged to continue taking this medication.  .      Patient Active Problem List   Diagnosis   • Atopic rhinitis   • Acute otitis media   • Bell's palsy   • Benign essential hypertension   • Cerebral atherosclerosis   • Type 2 diabetes mellitus with microalbuminuria, without long-term current use of insulin ([facility])   • Familial hypertriglyceridemia   • Low back pain   • Palpitations   • Saul nodes (DJD hand)     Advance Care Planning  Advance Directive is not on file.  ACP discussion was held with the patient during this visit. Patient has an advance directive (not in EMR), copy requested.          Objective    Vitals:    07/15/22 1118   BP: 160/72   Pulse: 66   SpO2: 96%   Weight: 74.8 kg (165 lb)   Height: 165.1 cm (65\")   PainSc:   2   PainLoc: Hip     Estimated body mass index is 27.46 kg/m² as calculated from the following:    Height as of " "this encounter: 165.1 cm (65\").    Weight as of this encounter: 74.8 kg (165 lb).    BMI is >= 25 and <30. (Overweight) The following options were offered after discussion;: exercise counseling/recommendations      Does the patient have evidence of cognitive impairment? No    Physical Exam            HEALTH RISK ASSESSMENT    Smoking Status:  Social History     Tobacco Use   Smoking Status Never Smoker   Smokeless Tobacco Never Used     Alcohol Consumption:  Social History     Substance and Sexual Activity   Alcohol Use Never     Fall Risk Screen:    STEADI Fall Risk Assessment was completed, and patient is at LOW risk for falls.Assessment completed on:4/8/2022    Depression Screening:  PHQ-2/PHQ-9 Depression Screening 7/15/2022   Retired Total Score -   Little Interest or Pleasure in Doing Things 0-->not at all   Feeling Down, Depressed or Hopeless 0-->not at all   PHQ-9: Brief Depression Severity Measure Score 0       Health Habits and Functional and Cognitive Screening:  Functional & Cognitive Status 7/15/2022   Do you have difficulty preparing food and eating? No   Do you have difficulty bathing yourself, getting dressed or grooming yourself? No   Do you have difficulty using the toilet? No   Do you have difficulty moving around from place to place? No   Do you have trouble with steps or getting out of a bed or a chair? No   Current Diet Well Balanced Diet   Dental Exam Not up to date   Eye Exam Not up to date   Exercise (times per week) 2 times per week   Current Exercises Include Walking   Current Exercise Activities Include -   Do you need help using the phone?  No   Are you deaf or do you have serious difficulty hearing?  No   Do you need help with transportation? No   Do you need help shopping? No   Do you need help preparing meals?  No   Do you need help with housework?  No   Do you need help with laundry? No   Do you need help taking your medications? No   Do you need help managing money? No   Do you ever " drive or ride in a car without wearing a seat belt? No   Have you felt unusual stress, anger or loneliness in the last month? No   Who do you live with? Alone   If you need help, do you have trouble finding someone available to you? No   Have you been bothered in the last four weeks by sexual problems? No   Do you have difficulty concentrating, remembering or making decisions? No       Age-appropriate Screening Schedule:  Refer to the list below for future screening recommendations based on patient's age, sex and/or medical conditions. Orders for these recommended tests are listed in the plan section. The patient has been provided with a written plan.    Health Maintenance   Topic Date Due   • DXA SCAN  Never done   • TDAP/TD VACCINES (1 - Tdap) Never done   • INFLUENZA VACCINE  10/01/2022   • HEMOGLOBIN A1C  10/11/2022   • MAMMOGRAM  01/14/2023   • LIPID PANEL  04/11/2023   • URINE MICROALBUMIN  04/11/2023   • DIABETIC FOOT EXAM  07/15/2023   • DIABETIC EYE EXAM  Discontinued              Assessment & Plan   CMS Preventative Services Quick Reference  Risk Factors Identified During Encounter  Cardiovascular Disease  Immunizations Discussed/Encouraged (specific Immunizations; COVID19  Obesity/Overweight   The above risks/problems have been discussed with the patient.  Follow up actions/plans if indicated are seen below in the Assessment/Plan Section.  Pertinent information has been shared with the patient in the After Visit Summary.    Diagnoses and all orders for this visit:    1. Benign essential hypertension (Primary)    2. Lower extremity edema  -     furosemide (Lasix) 20 MG tablet; Take 1 tablet by mouth 2 (Two) Times a Day.  Dispense: 180 tablet; Refill: 1  -     Basic Metabolic Panel    3. Yeast dermatitis  -     clotrimazole (Clotrimazole AF) 1 % cream; Apply 1 application topically to the appropriate area as directed 2 (Two) Times a Day.  Dispense: 60 g; Refill: 0  -     fluconazole (Diflucan) 100 MG  tablet; Take 1 tablet by mouth Daily.  Dispense: 3 tablet; Refill: 0    4. Enthesopathy of right hip region  -     Ambulatory Referral to Orthopedic Surgery    5. Comprehensive diabetic foot examination, type 2 DM, encounter for ([facility])        Follow Up:   No follow-ups on file.     An After Visit Summary and PPPS were made available to the patient.

## 2022-07-16 DIAGNOSIS — E87.6 HYPOKALEMIA: ICD-10-CM

## 2022-07-18 RX ORDER — POTASSIUM CHLORIDE 20MEQ/15ML
LIQUID (ML) ORAL
Qty: 500 ML | Refills: 0 | Status: SHIPPED | OUTPATIENT
Start: 2022-07-18 | End: 2022-09-04

## 2022-07-19 ENCOUNTER — CLINICAL SUPPORT (OUTPATIENT)
Dept: ORTHOPEDIC SURGERY | Facility: CLINIC | Age: 77
End: 2022-07-19

## 2022-07-19 VITALS — TEMPERATURE: 97.6 F | HEIGHT: 65 IN | BODY MASS INDEX: 27.49 KG/M2 | WEIGHT: 165 LBS

## 2022-07-19 DIAGNOSIS — M47.817 FACET ARTHROPATHY, LUMBOSACRAL: ICD-10-CM

## 2022-07-19 DIAGNOSIS — M51.36 DDD (DEGENERATIVE DISC DISEASE), LUMBAR: Primary | ICD-10-CM

## 2022-07-19 DIAGNOSIS — M54.16 LUMBAR RADICULOPATHY: ICD-10-CM

## 2022-07-19 DIAGNOSIS — M25.551 RIGHT HIP PAIN: ICD-10-CM

## 2022-07-19 PROCEDURE — 72100 X-RAY EXAM L-S SPINE 2/3 VWS: CPT | Performed by: NURSE PRACTITIONER

## 2022-07-19 PROCEDURE — 99214 OFFICE O/P EST MOD 30 MIN: CPT | Performed by: NURSE PRACTITIONER

## 2022-07-19 PROCEDURE — 73501 X-RAY EXAM HIP UNI 1 VIEW: CPT | Performed by: NURSE PRACTITIONER

## 2022-07-19 RX ORDER — METHYLPREDNISOLONE 4 MG/1
TABLET ORAL
Qty: 21 TABLET | Refills: 0 | Status: SHIPPED | OUTPATIENT
Start: 2022-07-19 | End: 2022-08-29

## 2022-07-19 RX ORDER — NYSTATIN 100000 U/G
OINTMENT TOPICAL
COMMUNITY
Start: 2022-07-16

## 2022-07-19 NOTE — PROGRESS NOTES
Patient Name: Radha Cruz   YOB: 1945  Referring Primary Care Physician: Beatriz Bernal APRN  BMI: Body mass index is 27.46 kg/m².    Chief Complaint:    Chief Complaint   Patient presents with   • Right Hip - Follow-up        HPI:     Radha Cruz is a 76 y.o. female who presents today for evaluation of   Chief Complaint   Patient presents with   • Right Hip - Follow-up   .  Patient presents for evaluation of right hip pain.  Patient states she had right hip pain approximately 8 months ago at the end of the year last year.  She saw Dr. Dia for greater trochanteric bursitis.  She was on a Medrol Dosepak at that time and was placed in physical therapy.  Patient states physical therapy helped her a good amount.  She has since denied any new injuries to the area.  She is experiencing pain in her right buttock and down her right leg, it is worse with sitting or standing for long periods of time.  She has no other complaints at this time      Subjective   Medications:   Home Medications:  Current Outpatient Medications on File Prior to Visit   Medication Sig   • aspirin 81 MG chewable tablet Chew daily.   • atorvastatin (LIPITOR) 40 MG tablet TAKE 1 TABLET BY MOUTH ONCE DAILY AT NIGHT   • Cetirizine HCl 10 MG capsule Take 10 mg by mouth Daily.   • clopidogrel (PLAVIX) 75 MG tablet Take 1 tablet by mouth once daily   • clotrimazole (Clotrimazole AF) 1 % cream Apply 1 application topically to the appropriate area as directed 2 (Two) Times a Day.   • felodipine (PLENDIL) 10 MG 24 hr tablet Take 1 tablet by mouth once daily   • fluconazole (Diflucan) 100 MG tablet Take 1 tablet by mouth Daily.   • furosemide (Lasix) 20 MG tablet Take 1 tablet by mouth 2 (Two) Times a Day.   • losartan (COZAAR) 100 MG tablet Take 1 tablet by mouth once daily   • magnesium oxide (MAGOX) 400 (241.3 Mg) MG tablet tablet Take 1 tablet by mouth once daily   • meloxicam (MOBIC) 7.5 MG tablet Take 1 tablet  by mouth once daily   • metFORMIN (GLUCOPHAGE) 500 MG tablet TAKE 1 TABLET BY MOUTH TWICE DAILY WITH MEALS   • metoprolol succinate XL (TOPROL-XL) 100 MG 24 hr tablet Take 1 tablet by mouth once daily   • montelukast (SINGULAIR) 10 MG tablet TAKE 1 TABLET BY MOUTH ONCE DAILY AT NIGHT   • nystatin (MYCOSTATIN) 379003 UNIT/GM ointment APPLY OINTMENT TOPICALLY TO AFFECTED AREA TWICE DAILY   • potassium chloride (KAYCIEL) 20 mEq/15 mL solution TAKE 15 ML BY MOUTH  ONCE DAILY   • promethazine-dextromethorphan (PROMETHAZINE-DM) 6.25-15 MG/5ML syrup TAKE 5 ML BY MOUTH  4 TIMES DAILY AS NEEDED FOR COUGH     No current facility-administered medications on file prior to visit.     Current Medications:  Scheduled Meds:  Continuous Infusions:No current facility-administered medications for this visit.    PRN Meds:.    I have reviewed the patient's medical history in detail and updated the computerized patient record.  Review and summarization of old records includes:    Past Medical History:   Diagnosis Date   • Allergic rhinitis    • Bell's palsy    • Cerebral atherosclerosis    • Chronic sinusitis    • Diabetes mellitus (HCC)    • Familial hypertriglyceridemia    • GERD (gastroesophageal reflux disease)    • Hypertension    • Low back pain    • Osteoarthritis of hands, bilateral    • Palpitations         Past Surgical History:   Procedure Laterality Date   • APPENDECTOMY     • BREAST BIOPSY Left     x2 (Benign x2)   • CHOLECYSTECTOMY OPEN          Social History     Occupational History   • Not on file   Tobacco Use   • Smoking status: Never Smoker   • Smokeless tobacco: Never Used   Substance and Sexual Activity   • Alcohol use: Never   • Drug use: Not on file   • Sexual activity: Not on file      Social History     Social History Narrative   • Not on file        Family History   Problem Relation Age of Onset   • Diabetes Mother    • Leukemia Daughter        ROS: 14 point review of systems was performed and all other systems  "were reviewed and are negative except for documented findings in HPI and today's encounter.     Allergies:   Allergies   Allergen Reactions   • Beta Adrenergic Blockers Other (See Comments)     Unknown, thinks may cause cough   • Codeine Nausea And Vomiting   • Latex Rash   • Lisinopril-Hydrochlorothiazide Other (See Comments)     Cannot remember what reaction occured     Constitutional:  Denies fever, shaking or chills   Eyes:  Denies change in visual acuity   HENT:  Denies nasal congestion or sore throat   Respiratory:  Denies cough or shortness of breath   Cardiovascular:  Denies chest pain or severe LE edema   GI:  Denies abdominal pain, nausea, vomiting, bloody stools or diarrhea   Musculoskeletal:  Numbness, tingling, pain, or loss of motor function only as noted above in history of present illness.  : Denies painful urination or hematuria  Integument:  Denies rash, lesion or ulceration   Neurologic:  Denies headache or focal weakness  Endocrine:  Denies lymphadenopathy  Psych:  Denies confusion or change in mental status   Hem:  Denies active bleeding    OBJECTIVE:  Physical Exam: 76 y.o. female  Wt Readings from Last 3 Encounters:   07/19/22 74.8 kg (165 lb)   07/15/22 74.8 kg (165 lb)   05/13/22 73.9 kg (163 lb)     Ht Readings from Last 1 Encounters:   07/19/22 165.1 cm (65\")     Body mass index is 27.46 kg/m².  Vitals:    07/19/22 1122   Temp: 97.6 °F (36.4 °C)     Vital signs reviewed.     General Appearance:    Alert, cooperative, in no acute distress                  Eyes: conjunctiva clear  ENT: external ears and nose atraumatic  CV: no peripheral edema  Resp: normal respiratory effort  Skin: no rashes or wounds; normal turgor  Psych: mood and affect appropriate  Lymph: no nodes appreciated  Neuro: gross sensation intact  Vascular:  Palpable peripheral pulse in noted extremity  Musculoskeletal Extremities: Right hip exam internal and external rotation good motion without pain.  Negative for " tenderness to palpation over the greater trochanter.  Negative for tenderness to palpation over the lumbar spine and bilateral SI joints.  Mild tenderness to palpation down the right buttock.  Full forward flexion and extension of her spine without pain.     Radiology:   AP pelvis and lateral of right hip taken in office today for symptoms of pain, with comparison views, show mild degenerative changes of the right hip joint, osteophyte formation at the iliac crest.    AP and lateral of the lumbar spine taken in office today her symptoms of pain, without comparison views shows 5 lumbar vertebrae with mild disc space narrowing noted most significantly at L5-S1, facet arthropathy noted throughout lumbar spine, no acute findings noted.  Of note multiple surgical staples noted along the midline incision noted on AP and lateral views of lumbar spine      Assessment:     ICD-10-CM ICD-9-CM   1. DDD (degenerative disc disease), lumbar  M51.36 722.52   2. Right hip pain  M25.551 719.45     Discussed with patient that on exam today she does not have right hip pain.  She is not tender over her greater trochanter where she has had bursitis in the past.  She is also not tender with internal and external rotation or Stinchfield test of the right leg.  She is actually more tender over the right buttock and sacroiliac nerve.  I explained that sometimes the symptoms can be coming from her low back.  We did x-rays of her lumbar spine which showed some narrowing of significantly L5-S1 which could be contributing to pain down the  right buttock and posterior leg.  Also discussed with the patient that on her lumbar x-rays we see that there are some surgical staples left in the midline incision.  Patient states that she had gallbladder surgery about 40 years ago at [facility] and this probably what they are from they have caused her no problems.  I informed her that she should let her primary care know that those are in place and if  they need to do anything about them, she verbalized understanding.  I discussed with the patient that we could try a oral steroid to help with inflammation of the nerves in the low back that could be contributing to that pain in her right buttock.  She states she took these in the past and that helped a little bit.  Medication side effects discuss with the patient. Instructed them to avoid using any NSAID's while taking the Medrol dose pack. They can use OTC Tyelonl as needed for pain, as well as rest, ice and elevation, they verbalized understanding.  I also put the patient in for more physical therapy, as she stated this helped her significantly in the past.  I discussed with her that we will have her follow-up with a spine doctor or sports medicine for the muscle pain if she is not proving, she is very most pain.      MDM/Plan:   The diagnosis(es), natural history, pathophysiology and treatment for diagnosis(es) were discussed. Opportunity given and questions answered.  Biomechanics of pertinent body areas discussed.  When appropriate, the use of ambulatory aids discussed.  MEDICATIONS:  The risks, benefits, warnings,side effects and alternatives of medications discussed.  Inflammation/pain control; with cold, heat, elevation and/or liniments discussed as appropriate  PT referral.    Carolann CANTU  7/19/2022    Much of this encounter note is an electronic transcription/translation of spoken language to printed text. The electronic translation of spoken language may permit erroneous, or at times, nonsensical words or phrases to be inadvertently transcribed; Although I have reviewed the note for such errors, some may still exist

## 2022-07-28 DIAGNOSIS — R05.9 COUGH: ICD-10-CM

## 2022-07-28 RX ORDER — DEXTROMETHORPHAN HYDROBROMIDE AND PROMETHAZINE HYDROCHLORIDE 15; 6.25 MG/5ML; MG/5ML
SYRUP ORAL
Qty: 118 ML | Refills: 0 | OUTPATIENT
Start: 2022-07-28

## 2022-08-02 DIAGNOSIS — R05.9 COUGH: ICD-10-CM

## 2022-08-02 RX ORDER — DEXTROMETHORPHAN HYDROBROMIDE AND PROMETHAZINE HYDROCHLORIDE 15; 6.25 MG/5ML; MG/5ML
SYRUP ORAL
Qty: 118 ML | Refills: 0 | OUTPATIENT
Start: 2022-08-02

## 2022-08-03 DIAGNOSIS — R00.2 PALPITATIONS: ICD-10-CM

## 2022-08-03 DIAGNOSIS — R05.9 COUGH: ICD-10-CM

## 2022-08-03 RX ORDER — METOPROLOL SUCCINATE 100 MG/1
100 TABLET, EXTENDED RELEASE ORAL DAILY
Qty: 90 TABLET | Refills: 0 | Status: SHIPPED | OUTPATIENT
Start: 2022-08-03 | End: 2022-08-04 | Stop reason: SDUPTHER

## 2022-08-03 RX ORDER — DEXTROMETHORPHAN HYDROBROMIDE AND PROMETHAZINE HYDROCHLORIDE 15; 6.25 MG/5ML; MG/5ML
SYRUP ORAL
Qty: 118 ML | Refills: 0 | OUTPATIENT
Start: 2022-08-03

## 2022-08-03 NOTE — TELEPHONE ENCOUNTER
Caller: Radha Cruz    Relationship: Self    Best call back number: [phone]    Requested Prescriptions:   Requested Prescriptions     Pending Prescriptions Disp Refills   • promethazine-dextromethorphan (PROMETHAZINE-DM) 6.25-15 MG/5ML syrup 118 mL 0   • metoprolol succinate XL (TOPROL-XL) 100 MG 24 hr tablet 90 tablet 0     Sig: Take 1 tablet by mouth Daily.        Pharmacy where request should be sent: [address] [phone] [facility] [phone] FX     Additional details provided by patient: 14 DAYS    Does the patient have less than a 3 day supply:  [] Yes  [x] No    Jimenez YOUSSEF Rep   08/03/22 10:40 EDT

## 2022-08-04 ENCOUNTER — TELEPHONE (OUTPATIENT)
Dept: FAMILY MEDICINE CLINIC | Facility: CLINIC | Age: 77
End: 2022-08-04

## 2022-08-04 DIAGNOSIS — R00.2 PALPITATIONS: ICD-10-CM

## 2022-08-04 DIAGNOSIS — R05.9 COUGH: ICD-10-CM

## 2022-08-04 RX ORDER — DEXTROMETHORPHAN HYDROBROMIDE AND PROMETHAZINE HYDROCHLORIDE 15; 6.25 MG/5ML; MG/5ML
5 SYRUP ORAL DAILY
Qty: 118 ML | Refills: 0 | Status: SHIPPED | OUTPATIENT
Start: 2022-08-04 | End: 2022-08-29 | Stop reason: SDUPTHER

## 2022-08-04 RX ORDER — METOPROLOL SUCCINATE 100 MG/1
100 TABLET, EXTENDED RELEASE ORAL DAILY
Qty: 90 TABLET | Refills: 0 | Status: SHIPPED | OUTPATIENT
Start: 2022-08-04 | End: 2023-02-23

## 2022-08-04 NOTE — TELEPHONE ENCOUNTER
Caller: Radha Vázquez     Relationship: Self     Best call back number: [phone]     Requested Prescriptions:   Requested Prescriptions             Pending Prescriptions Disp Refills   • promethazine-dextromethorphan (PROMETHAZINE-DM) 6.25-15 MG/5ML syrup 118 mL 0   • metoprolol succinate XL (TOPROL-XL) 100 MG 24 hr tablet 90 tablet 0       Sig: Take 1 tablet by mouth Daily.         Pharmacy where request should be sent: [address] [phone] [facility] [phone] FX         Does the patient have less than a 3 day supply:  [x]? Yes  []? No

## 2022-08-24 ENCOUNTER — TREATMENT (OUTPATIENT)
Dept: PHYSICAL THERAPY | Facility: CLINIC | Age: 77
End: 2022-08-24

## 2022-08-24 DIAGNOSIS — R26.2 DIFFICULTY WALKING: ICD-10-CM

## 2022-08-24 DIAGNOSIS — M54.41 CHRONIC MIDLINE LOW BACK PAIN WITH BILATERAL SCIATICA: Primary | ICD-10-CM

## 2022-08-24 DIAGNOSIS — M54.42 CHRONIC MIDLINE LOW BACK PAIN WITH BILATERAL SCIATICA: Primary | ICD-10-CM

## 2022-08-24 DIAGNOSIS — G89.29 CHRONIC MIDLINE LOW BACK PAIN WITH BILATERAL SCIATICA: Primary | ICD-10-CM

## 2022-08-24 DIAGNOSIS — R29.898 WEAKNESS OF BOTH HIPS: ICD-10-CM

## 2022-08-24 PROCEDURE — 97112 NEUROMUSCULAR REEDUCATION: CPT | Performed by: PHYSICAL THERAPIST

## 2022-08-24 PROCEDURE — 97110 THERAPEUTIC EXERCISES: CPT | Performed by: PHYSICAL THERAPIST

## 2022-08-24 PROCEDURE — 97161 PT EVAL LOW COMPLEX 20 MIN: CPT | Performed by: PHYSICAL THERAPIST

## 2022-08-24 NOTE — PROGRESS NOTES
Physical Therapy Initial Evaluation and Plan of Care        Patient: Radha Cruz   : 1945  Diagnosis/ICD-10 Code:  Chronic midline low back pain with bilateral sciatica [M54.41, M54.42, G89.29]  Referring practitioner: ALONDRA Mayberry  Date of Initial Visit: 2022  Today's Date: 2022  Patient seen for 1 sessions           Subjective Questionnaire: Oswestry:       Subjective Evaluation    History of Present Illness  Mechanism of injury: Pt reports that she has been having lower back pain for a year but it has gradually worsened with no particular MEGHAN.  Pt reports that she is not taking pain medication at this time.  Pt reports that prolonged walking and standing increases her pain.  Pt reports prolonged sitting bothers her lower back as well.  Pt reports that she has R LE radicular symptoms down to her lateral ankle.  Pt reports that this occasionally occurs on the L LE but not as much.   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     Per MD note:   AP and lateral of the lumbar spine taken in office today her symptoms of pain, without comparison views shows 5 lumbar vertebrae with mild disc space narrowing noted most significantly at L5-S1, facet arthropathy noted throughout lumbar spine, no acute findings noted.      Patient Occupation: retired Pain  Current pain ratin  At best pain ratin  At worst pain rating: 10  Relieving factors: change in position  Aggravating factors: ambulation, squatting, lifting, prolonged positioning, repetitive movement, sleeping, standing and movement    Social Support  Lives with: adult children    Treatments  Previous treatment: physical therapy  Patient Goals  Patient goals for therapy: decreased pain, increased motion, increased strength, independence with ADLs/IADLs and return  to sport/leisure activities             Objective          Static Posture   General Observations  Shifted left.     Head  Forward.    Shoulders  Rounded.    Scapulae  Left protracted and right protracted.    Thoracic Spine  Hyperkyphosis.    Rib Cage  Rib hump.    Lumbar Spine   Decreased lordosis.     Hip   Hip (Right): Increased flexion.     Knee   Knee (Right): Flexed.     Comments  Supine R LE long in supine than L LE  R hip iliac crest higher on R, increased R hip/knee flexion in standing    Palpation   Left   Muscle spasm in the erector spinae and lumbar paraspinals.   Tenderness of the erector spinae and lumbar paraspinals.     Right   Muscle spasm in the erector spinae and lumbar paraspinals. Tenderness of the erector spinae and lumbar paraspinals.     Neurological Testing     Sensation     Lumbar   Left   Intact: light touch    Right   Intact: light touch    Comments   Right light touch: bilateral LE light touch sensation intact    Active Range of Motion     Lumbar   Normal active range of motion    Additional Active Range of Motion Details        Strength/Myotome Testing     Left Hip   Planes of Motion   Flexion: 4+  Extension: 4-  Abduction: 4-    Right Hip   Planes of Motion   Flexion: 4+  Extension: 4-  Abduction: 4-    Left Knee   Flexion: 5  Extension: 5    Right Knee   Flexion: 5  Extension: 5    Left Ankle/Foot   Dorsiflexion: 5    Right Ankle/Foot   Dorsiflexion: 5    Muscle Activation     Additional Muscle Activation Details  Poor PPT activation, requires max tactile and verbal cues for proper activation    Tests     Lumbar     Left   Negative passive SLR and quadrant.     Right   Negative passive SLR and quadrant.     Left Hip   Negative MELVA.   90/90 SLR: Negative.   SLR: Negative.     Right Hip   Negative MELVA.   90/90 SLR: Negative.  SLR: Negative.     Ambulation     Observational Gait   Walking speed within functional limits.   Left foot contact pattern: heel to toe  Right foot contact  pattern: heel to toe    Additional Observational Gait Details  Pt demonstrates decreased R LE stance time and mild lateral lean to L     General Comments     Lumbar Comments  No hamstring tightness noted  Bilateral piriformis tightness noted, worse on R than L         Assessment & Plan     Assessment  Impairments: abnormal gait, abnormal muscle firing, abnormal or restricted ROM, activity intolerance, impaired balance, impaired physical strength, lacks appropriate home exercise program, pain with function, safety issue and weight-bearing intolerance  Functional Limitations: lifting, sleeping, walking, pulling, pushing, uncomfortable because of pain, moving in bed, sitting, standing, stooping and unable to perform repetitive tasks  Assessment details: Patient presents with signs/symptoms consistent with lower back pain with intermittent bilateral LE radiculopathy including decreased lumbar ROM, bilateral hip and core weakness and reports of pain/radicular limiting function during ADLs as shown on the ODELL. Patient would benefit from skilled PT services in order to address deficits limiting function at this time.  HEP was given to patient this session and education on HEP/diagnosis provided to patient.         Prognosis: good    Goals  Plan Goals: 1. The patient complains of low back pain.  LTG 1: 12 weeks:  The patient will report a pain rating of 1/10 or better in order to improve  tolerance to activities of daily living and improve sleep quality.  STATUS:  New  STG 1a: 6 weeks:  The patient will report a pain rating of 3/10 or better.  STATUS:  New  TREATMENT:  Therapeutic exercises, manual therapy, aquatic therapy, home exercise   instruction, and modalities as needed for pain to include:  electrical stimulation, moist heat, ice,   ultrasound, and diathermy.      2. The patient demonstrates weakness of the bilateral hips.  LTG 2: 12 weeks:  The patient will demonstrate 4+ /5 strength for bilateral hip flexion,  abduction,  and extension in order to improve hip stability.  STATUS:  New  STG 2a: 6 weeks:  The patient will demonstrate 4 /5 strength for bilateral hip abduction,  and extension.  STATUS:  New  TREATMENT: Therapeutic exercises, manual therapy, aquatic therapy, home exercise instruction,  and modalities as needed for pain to include:  electrical stimulation, moist heat, ice, ultrasound, and   diathermy.      3. The patient has gait dysfunction.  LTG 3: 12 weeks:  The patient will ambulate without assistive device, independently, for   community distances with minimal limp in order to improve mobility and allow   patient to perform activities such as grocery shopping with greater ease.  STATUS:  New  TREATMENT: Gait training, aquatic therapy, therapeutic exercise, and home exercise instruction.    4. Mobility: Walking/Moving Around Functional Limitation    LTG 1: 12 weeks:  The patient will demonstrate 1-19 % limitation by achieving a score of 4/45 on the ODELL.  STATUS:  New  TREATMENT:  Manual therapy, therapeutic exercise, home exercise instruction, and modalities as needed to include: moist heat, electrical stimulation, and ultrasound.      Plan  Therapy options: will be seen for skilled therapy services  Planned modality interventions: cryotherapy, TENS and thermotherapy (hydrocollator packs)  Planned therapy interventions: balance/weight-bearing training, ADL retraining, soft tissue mobilization, strengthening, stretching, therapeutic activities, joint mobilization, home exercise program, gait training, functional ROM exercises, flexibility, body mechanics training, postural training, neuromuscular re-education, manual therapy and spinal/joint mobilization  Frequency: 2x week  Duration in weeks: 12  Treatment plan discussed with: patient        Timed:         Manual Therapy:    0     mins  80014;     Therapeutic Exercise:    15     mins  87788;     Neuromuscular Kiet:    8    mins  12985;    Therapeutic  Activity:     0     mins  16707;     Gait Trainin     mins  48240;     Ultrasound:     0     mins  27590;    Ionto                               0    mins   38589  Self-care  __0__ mins 91230    Un-Timed:  Electrical Stimulation:    0     mins  57297 ( );  Traction     0     mins 68878  Low Eval     30     Mins  79160  Mod Eval     0     Mins  56580  High Eval                       0     Mins  66180  Hot pack     0     Mins    Cold pack                       0     Mins      Timed Treatment:   23   mins   Total Treatment:     53   mins    PT SIGNATURE: Sheba Silverman, PT      Electronically signed 2022  [place] License: [id number]    Initial Certification    Certification Period: 2022 thru 2022  NPI: [id number]  I certify that the therapy services are furnished while this patient is under my care.  The services outlined above are required by this patient, and will be reviewed every 90 days.     PHYSICIAN: Carolann Ponce APRN      DATE:     Please sign and return via fax to [phone].. Thank you, [facility].

## 2022-08-29 ENCOUNTER — OFFICE VISIT (OUTPATIENT)
Dept: FAMILY MEDICINE CLINIC | Facility: CLINIC | Age: 77
End: 2022-08-29

## 2022-08-29 VITALS
SYSTOLIC BLOOD PRESSURE: 122 MMHG | WEIGHT: 162 LBS | HEART RATE: 66 BPM | HEIGHT: 65 IN | OXYGEN SATURATION: 98 % | BODY MASS INDEX: 26.99 KG/M2 | DIASTOLIC BLOOD PRESSURE: 64 MMHG

## 2022-08-29 DIAGNOSIS — R80.9 TYPE 2 DIABETES MELLITUS WITH MICROALBUMINURIA, WITHOUT LONG-TERM CURRENT USE OF INSULIN: ICD-10-CM

## 2022-08-29 DIAGNOSIS — I67.2 CEREBRAL ATHEROSCLEROSIS: ICD-10-CM

## 2022-08-29 DIAGNOSIS — R05.9 COUGH: ICD-10-CM

## 2022-08-29 DIAGNOSIS — Z78.0 POSTMENOPAUSAL: ICD-10-CM

## 2022-08-29 DIAGNOSIS — I10 BENIGN ESSENTIAL HYPERTENSION: Primary | ICD-10-CM

## 2022-08-29 DIAGNOSIS — J30.2 SEASONAL ALLERGIC RHINITIS, UNSPECIFIED TRIGGER: ICD-10-CM

## 2022-08-29 DIAGNOSIS — Z91.09 ENVIRONMENTAL ALLERGIES: ICD-10-CM

## 2022-08-29 DIAGNOSIS — E87.6 HYPOKALEMIA: ICD-10-CM

## 2022-08-29 DIAGNOSIS — E11.29 TYPE 2 DIABETES MELLITUS WITH MICROALBUMINURIA, WITHOUT LONG-TERM CURRENT USE OF INSULIN: ICD-10-CM

## 2022-08-29 PROCEDURE — 99214 OFFICE O/P EST MOD 30 MIN: CPT

## 2022-08-29 RX ORDER — CLOPIDOGREL BISULFATE 75 MG/1
75 TABLET ORAL DAILY
Qty: 90 TABLET | Refills: 0 | Status: SHIPPED | OUTPATIENT
Start: 2022-08-29 | End: 2022-10-18 | Stop reason: SDUPTHER

## 2022-08-29 RX ORDER — MONTELUKAST SODIUM 10 MG/1
10 TABLET ORAL NIGHTLY
Qty: 90 TABLET | Refills: 0 | Status: SHIPPED | OUTPATIENT
Start: 2022-08-29 | End: 2022-11-23

## 2022-08-29 RX ORDER — DEXTROMETHORPHAN HYDROBROMIDE AND PROMETHAZINE HYDROCHLORIDE 15; 6.25 MG/5ML; MG/5ML
5 SYRUP ORAL DAILY
Qty: 118 ML | Refills: 0 | Status: SHIPPED | OUTPATIENT
Start: 2022-08-29 | End: 2022-10-10

## 2022-08-29 NOTE — PROGRESS NOTES
"Subjective   Radha Cruz is a 76 y.o. female. Who presents for routine follow up on chronic conditions.     History of Present Illness     DM2: taking metformin BID. Doing well. Denies neuropathy, vision changes. Does not check BG at home  Watching diet. Eats more vegetables, fish. Does not eat fast foods, packaged foods.     Hx chronic cough- uses cough medication as needed. Chronic allergies and wheezing. Needs refill. No symptoms today    Hx HLD- atherosclerosis- doing well on statin and plavix. No concerns   HTN- last time saw Stephania made med changes, doing well with current regimen. Not checking BP at home. BP stable today  The following portions of the patient's history were reviewed and updated as appropriate: allergies, current medications, past family history, past medical history, past social history and past surgical history.    Review of Systems   Constitutional: Negative for fatigue, fever and unexpected weight loss.   Eyes: Negative for visual disturbance.   Respiratory: Negative.    Cardiovascular: Negative.    Gastrointestinal: Negative for abdominal pain, constipation and diarrhea.   Endocrine: Negative.    Genitourinary: Negative for difficulty urinating.   Neurological: Negative for dizziness and headache.   Psychiatric/Behavioral: Negative for sleep disturbance.       Objective    /64   Pulse 66   Ht 165.1 cm (65\")   Wt 73.5 kg (162 lb)   SpO2 98%   BMI 26.96 kg/m²     Physical Exam  Constitutional:       Appearance: Normal appearance. She is not ill-appearing.   Neck:      Vascular: No carotid bruit.   Cardiovascular:      Rate and Rhythm: Normal rate and regular rhythm.      Pulses: Normal pulses.           Carotid pulses are 2+ on the right side and 2+ on the left side.       Posterior tibial pulses are 2+ on the right side and 2+ on the left side.      Heart sounds: Normal heart sounds, S1 normal and S2 normal. No murmur heard.  Pulmonary:      Effort: Pulmonary effort is " normal. No tachypnea, bradypnea or respiratory distress.      Breath sounds: Normal breath sounds.   Abdominal:      General: Bowel sounds are normal. There is no distension.   Musculoskeletal:      Right lower leg: No edema.      Left lower leg: No edema.   Neurological:      General: No focal deficit present.      Mental Status: She is alert and oriented to person, place, and time.      Cranial Nerves: No dysarthria or facial asymmetry.      Gait: Gait is intact.   Psychiatric:         Attention and Perception: Attention normal.         Mood and Affect: Mood normal.         Speech: Speech normal.         Behavior: Behavior normal.         Thought Content: Thought content normal.         Cognition and Memory: Cognition normal.         Judgment: Judgment normal.       Assessment & Plan   Diagnoses and all orders for this visit:    1. Benign essential hypertension (Primary)  -     Comprehensive Metabolic Panel  -     Magnesium  -     BP improved. Cont current regimen. Monitor at home. Update monitoring    2. Hypokalemia  -     Comprehensive Metabolic Panel  -     Magnesium  -     Update monitoring.       3. Type 2 diabetes mellitus with microalbuminuria, without long-term current use of insulin (HCC)  -     Hemoglobin A1c  -     Microalbumin / Creatinine Urine Ratio - Urine, Clean Catch  -     Last A1c 6.6- update monitoring. Cont metformin for now and work on healthy diet    4. Cerebral atherosclerosis  -     clopidogrel (PLAVIX) 75 MG tablet; Take 1 tablet by mouth Daily.  Dispense: 90 tablet; Refill: 0  -      Stable. Cont same tx    5. Seasonal allergic rhinitis, unspecified trigger  -     Cetirizine HCl 10 MG capsule; Take 10 mg by mouth Daily.  Dispense: 90 capsule; Refill: 0    6. Cough  -     promethazine-dextromethorphan (PROMETHAZINE-DM) 6.25-15 MG/5ML syrup; Take 5 mL by mouth Daily.  Dispense: 118 mL; Refill: 0  -      Advised on use again. Only PRN. Pt reassures me that she only uses for flares.    7.  Environmental allergies  -     montelukast (SINGULAIR) 10 MG tablet; Take 1 tablet by mouth Every Night.  Dispense: 90 tablet; Refill: 0    8. Postmenopausal  -     DEXA Bone Density Axial  -     Counseling provided. Never done. Would like to check      Follow up in 6 months, sooner as needed. Call if questions. Will advise further based on lab results         - Pt agrees with plan of care and states no further concerns or questions today    This document is intended for medical expert use only. Reading of this document by patients and/or patient's family without participating medical staff guidance may result in misinterpretation and unintended morbidity.  Any interpretation of such data is the responsibility of the patient and/or family member responsible for the patient in concert with their primary or specialist providers, not to be left for sources of online searches such as [employer], [employer] or similar queries. Relying on these approaches to knowledge may result in misinterpretation, misguided goals of care and even death should patients or family members try recommendations outside of the realm of professional medical care in a supervised way.     Please allow 3-5 business days for recommendations based on new results     Go to the ER for any possible lifethreatening symptoms such as chest pain or shortness of air.      I personally spent 23 minutes with this patient, preparing for the visit, reviewing tests, obtaining and/or reviewing a separately obtained history, performing a medically appropriate examination and/or evaluation , counseling and educating the patient/family/caregiver, ordering medications, tests, or procedures, documenting information in the medical record and independently interpreting results.

## 2022-08-30 LAB
ALBUMIN SERPL-MCNC: 4.4 G/DL (ref 3.7–4.7)
ALBUMIN/CREAT UR: 53 MG/G CREAT (ref 0–29)
ALBUMIN/GLOB SERPL: 1.2 {RATIO} (ref 1.2–2.2)
ALP SERPL-CCNC: 81 IU/L (ref 44–121)
ALT SERPL-CCNC: 26 IU/L (ref 0–32)
AST SERPL-CCNC: 33 IU/L (ref 0–40)
BILIRUB SERPL-MCNC: 0.6 MG/DL (ref 0–1.2)
BUN SERPL-MCNC: 14 MG/DL (ref 8–27)
BUN/CREAT SERPL: 14 (ref 12–28)
CALCIUM SERPL-MCNC: 9.5 MG/DL (ref 8.7–10.3)
CHLORIDE SERPL-SCNC: 87 MMOL/L (ref 96–106)
CO2 SERPL-SCNC: 28 MMOL/L (ref 20–29)
CREAT SERPL-MCNC: 0.99 MG/DL (ref 0.57–1)
CREAT UR-MCNC: 33.1 MG/DL
EGFRCR-CYS SERPLBLD CKD-EPI 2021: 60 ML/MIN/1.73
GLOBULIN SER CALC-MCNC: 3.7 G/DL (ref 1.5–4.5)
GLUCOSE SERPL-MCNC: 122 MG/DL (ref 65–99)
HBA1C MFR BLD: 7.3 % (ref 4.8–5.6)
MAGNESIUM SERPL-MCNC: 1.9 MG/DL (ref 1.6–2.3)
MICROALBUMIN UR-MCNC: 17.4 UG/ML
POTASSIUM SERPL-SCNC: 3.5 MMOL/L (ref 3.5–5.2)
PROT SERPL-MCNC: 8.1 G/DL (ref 6–8.5)
SODIUM SERPL-SCNC: 132 MMOL/L (ref 134–144)

## 2022-09-01 ENCOUNTER — TREATMENT (OUTPATIENT)
Dept: PHYSICAL THERAPY | Facility: CLINIC | Age: 77
End: 2022-09-01

## 2022-09-01 DIAGNOSIS — M54.41 CHRONIC MIDLINE LOW BACK PAIN WITH BILATERAL SCIATICA: Primary | ICD-10-CM

## 2022-09-01 DIAGNOSIS — G89.29 CHRONIC MIDLINE LOW BACK PAIN WITH BILATERAL SCIATICA: Primary | ICD-10-CM

## 2022-09-01 DIAGNOSIS — R26.2 DIFFICULTY WALKING: ICD-10-CM

## 2022-09-01 DIAGNOSIS — M54.42 CHRONIC MIDLINE LOW BACK PAIN WITH BILATERAL SCIATICA: Primary | ICD-10-CM

## 2022-09-01 DIAGNOSIS — R29.898 WEAKNESS OF BOTH HIPS: ICD-10-CM

## 2022-09-01 PROCEDURE — 97140 MANUAL THERAPY 1/> REGIONS: CPT | Performed by: PHYSICAL THERAPIST

## 2022-09-01 PROCEDURE — 97110 THERAPEUTIC EXERCISES: CPT | Performed by: PHYSICAL THERAPIST

## 2022-09-01 NOTE — PROGRESS NOTES
Physical Therapy Daily Treatment Note        Patient: Radha Cruz   : 1945  Diagnosis/ICD-10 Code:  Chronic midline low back pain with bilateral sciatica [M54.41, M54.42, G89.29]  Referring practitioner: ALONDRA Mayberry  Date of Initial Visit: Type: THERAPY  Noted: 2022  Today's Date: 2022  Patient seen for 2 sessions             Subjective   Radha Cruz reports: mild lower back pain today.    Objective   Bilateral hip abduction MMT: 4-/5  See Exercise, Manual, and Modality Logs for complete treatment.       Assessment/Plan  Patient tolerated all exercise progressions and manual therapy well today but continues to demonstrate bilateral hip/core strength and lumbar ROM deficits limiting function.  Pt requires max cues for proper PPT activation and loses ability to complete PPT when external cues are removed.  Continue to progress per patient tolerance.    Progress per Plan of Care           Timed:  Manual Therapy:    10     mins  90365;  Therapeutic Exercise:    13     mins  10446;     Neuromuscular Kiet:    0    mins  25752;    Therapeutic Activity:     0     mins  44362;     Gait Trainin     mins  50928;     Ultrasound:     0     mins  14346;    Electrical Stimulation:    0     mins  10351;  Iontophoresis     0     mins  62501    Untimed:  Electrical Stimulation:    0     mins  25852 ( );  Mechanical Traction:    0     mins  50757;   Fluidotherapy     0     mins  02920  Hot pack     0     Mins    Cold pack                       0     Mins     Timed Treatment:   23   mins   Total Treatment:     30   mins        Sheba Silverman PT    Electronically signed [unfilled]  [place] License: [id number]  NPI number: [id number]

## 2022-09-04 DIAGNOSIS — E87.6 HYPOKALEMIA: ICD-10-CM

## 2022-09-04 RX ORDER — POTASSIUM CHLORIDE 20MEQ/15ML
LIQUID (ML) ORAL
Qty: 500 ML | Refills: 0 | Status: SHIPPED | OUTPATIENT
Start: 2022-09-04 | End: 2022-11-27

## 2022-09-07 ENCOUNTER — TREATMENT (OUTPATIENT)
Dept: PHYSICAL THERAPY | Facility: CLINIC | Age: 77
End: 2022-09-07

## 2022-09-07 DIAGNOSIS — M54.42 CHRONIC MIDLINE LOW BACK PAIN WITH BILATERAL SCIATICA: Primary | ICD-10-CM

## 2022-09-07 DIAGNOSIS — E11.29 TYPE 2 DIABETES MELLITUS WITH MICROALBUMINURIA, WITHOUT LONG-TERM CURRENT USE OF INSULIN: Primary | ICD-10-CM

## 2022-09-07 DIAGNOSIS — R80.9 TYPE 2 DIABETES MELLITUS WITH MICROALBUMINURIA, WITHOUT LONG-TERM CURRENT USE OF INSULIN: Primary | ICD-10-CM

## 2022-09-07 DIAGNOSIS — M25.559 PAIN, HIP: ICD-10-CM

## 2022-09-07 DIAGNOSIS — E87.1 HYPONATREMIA: Primary | ICD-10-CM

## 2022-09-07 DIAGNOSIS — R26.2 DIFFICULTY WALKING: ICD-10-CM

## 2022-09-07 DIAGNOSIS — M54.41 CHRONIC MIDLINE LOW BACK PAIN WITH BILATERAL SCIATICA: Primary | ICD-10-CM

## 2022-09-07 DIAGNOSIS — G89.29 CHRONIC MIDLINE LOW BACK PAIN WITH BILATERAL SCIATICA: Primary | ICD-10-CM

## 2022-09-07 DIAGNOSIS — R29.898 WEAKNESS OF BOTH HIPS: ICD-10-CM

## 2022-09-07 DIAGNOSIS — R60.0 LOWER EXTREMITY EDEMA: ICD-10-CM

## 2022-09-07 PROCEDURE — 97530 THERAPEUTIC ACTIVITIES: CPT | Performed by: PHYSICAL THERAPIST

## 2022-09-07 PROCEDURE — 97140 MANUAL THERAPY 1/> REGIONS: CPT | Performed by: PHYSICAL THERAPIST

## 2022-09-07 PROCEDURE — 97110 THERAPEUTIC EXERCISES: CPT | Performed by: PHYSICAL THERAPIST

## 2022-09-07 RX ORDER — FUROSEMIDE 20 MG/1
20 TABLET ORAL DAILY
Qty: 90 TABLET | Refills: 0
Start: 2022-09-07 | End: 2023-03-06 | Stop reason: SDUPTHER

## 2022-09-07 NOTE — PROGRESS NOTES
Physical Therapy Daily Treatment Note        Patient: Radha Cruz   : 1945  Diagnosis/ICD-10 Code:  Chronic midline low back pain with bilateral sciatica [M54.41, M54.42, G89.29]  Referring practitioner: ALONDRA Mayberry  Date of Initial Visit: Type: THERAPY  Noted: 2022  Today's Date: 2022  Patient seen for 3 sessions             Subjective   Radha Cruz denies having any pain upon arrival today.      Objective       + Tightness bilateral Hip/Knee Musculature    See Exercise and Manual Logs for complete treatment.       Assessment/Plan    Pt tolerated therapy session well - with progression of therapeutic exercises, CKC-Functional activities, and Manual therapy. She has improved, but continues to have deficits in her Trunk and Bilateral Lower Extremity ROM,  Strength, and Stability; limiting function and ability to perform ADLs at this time.  She continues to exhibit significant tightness in right lateral trunk and right hip musculature.    Progress per Plan of Care           Timed:  Manual Therapy:    10     mins  74251;  Therapeutic Exercise:    20     mins  72691;     Neuromuscular Kiet:    0    mins  28656;    Therapeutic Activity:     8     mins  02494;     Gait Trainin     mins  02592;     Ultrasound:     0     mins  70675;    Electrical Stimulation:    0     mins  24373;  Iontophoresis     0     mins  82358    Untimed:  Electrical Stimulation:    0     mins  07864 ( );  Mechanical Traction:    0     mins  87905;   Fluidotherapy     0     mins  42042  Hot pack     0     mins  82676  Cold pack     0     mins  27536    Timed Treatment:   38   mins   Total Treatment:     38   mins        Juanita Umaña PTA  Physical Therapist Assistant

## 2022-09-12 ENCOUNTER — TELEPHONE (OUTPATIENT)
Dept: PHYSICAL THERAPY | Facility: CLINIC | Age: 77
End: 2022-09-12

## 2022-09-13 RX ORDER — LOSARTAN POTASSIUM 100 MG/1
TABLET ORAL
Qty: 90 TABLET | Refills: 1 | Status: SHIPPED | OUTPATIENT
Start: 2022-09-13 | End: 2023-03-06 | Stop reason: SDUPTHER

## 2022-09-14 ENCOUNTER — TREATMENT (OUTPATIENT)
Dept: PHYSICAL THERAPY | Facility: CLINIC | Age: 77
End: 2022-09-14

## 2022-09-14 DIAGNOSIS — M54.50 LOW BACK PAIN, UNSPECIFIED BACK PAIN LATERALITY, UNSPECIFIED CHRONICITY, UNSPECIFIED WHETHER SCIATICA PRESENT: ICD-10-CM

## 2022-09-14 DIAGNOSIS — R26.9 GAIT DISTURBANCE: ICD-10-CM

## 2022-09-14 DIAGNOSIS — M25.559 PAIN, HIP: ICD-10-CM

## 2022-09-14 DIAGNOSIS — M54.41 CHRONIC MIDLINE LOW BACK PAIN WITH BILATERAL SCIATICA: Primary | ICD-10-CM

## 2022-09-14 DIAGNOSIS — M54.42 CHRONIC MIDLINE LOW BACK PAIN WITH BILATERAL SCIATICA: Primary | ICD-10-CM

## 2022-09-14 DIAGNOSIS — G89.29 CHRONIC MIDLINE LOW BACK PAIN WITH BILATERAL SCIATICA: Primary | ICD-10-CM

## 2022-09-14 DIAGNOSIS — R29.898 WEAKNESS OF BOTH HIPS: ICD-10-CM

## 2022-09-14 DIAGNOSIS — R26.2 DIFFICULTY WALKING: ICD-10-CM

## 2022-09-14 PROCEDURE — 97140 MANUAL THERAPY 1/> REGIONS: CPT | Performed by: PHYSICAL THERAPIST

## 2022-09-14 PROCEDURE — 97530 THERAPEUTIC ACTIVITIES: CPT | Performed by: PHYSICAL THERAPIST

## 2022-09-14 PROCEDURE — 97110 THERAPEUTIC EXERCISES: CPT | Performed by: PHYSICAL THERAPIST

## 2022-09-14 NOTE — PROGRESS NOTES
Please inform pt that her bone mineral density is normal. Stay as active as able and do weight bearing exercises such as walking, weight lifting, etc. to keep bones strong.     Repeat in 2 years per recommendations.

## 2022-09-14 NOTE — PROGRESS NOTES
Physical Therapy Daily Treatment Note        Patient: Radha Cruz   : 1945  Diagnosis/ICD-10 Code:  Chronic midline low back pain with bilateral sciatica [M54.41, M54.42, G89.29]  Referring practitioner: ALONDRA Mayberry  Date of Initial Visit: Type: THERAPY  Noted: 2022  Today's Date: 2022  Patient seen for 4 sessions             Subjective   Radha Cruz rates her lower back pain at 7/10 upon arrival today.    Objective     + Tightness bilateral Hip/Knee Musculature  + Tightness- Hypomobility Thoracolumbar segmental mobility  + Tightness  Thoracolumbar Paravertebral musculature (Right     more than Left)    See Exercise and Manual Logs for complete treatment.       Assessment/Plan     Pt tolerated therapy session well - with progression of therapeutic exercises, CKC-Functional activities, and Manual therapy. Shehas improved, but continues to have deficits in her Trunk and Bilateral Lower Extremity ROM,  Strength, and Stability; limiting function and ability to perform all ADLs without pain at this time.  She continues to exhibit significant tightness in right lateral trunk and right hip musculature    Progress per Plan of Care           Timed:  Manual Therapy:    11     mins  74452;  Therapeutic Exercise:    20     mins  24732;     Neuromuscular Kiet:    0    mins  27824;    Therapeutic Activity:     8     mins  49318;     Gait Trainin     mins  97918;     Ultrasound:     0     mins  79633;    Electrical Stimulation:    0     mins  37310;  Iontophoresis     0     mins  44034    Untimed:  Electrical Stimulation:    0     mins  31133 (MC );  Mechanical Traction:    0     mins  27853;   Fluidotherapy     0     mins  81868  Hot pack     0     mins  76227  Cold pack     0     mins  54200    Timed Treatment:   39   mins   Total Treatment:     39   mins        Juanita Umaña PTA  Physical Therapist Assistant

## 2022-09-20 ENCOUNTER — TREATMENT (OUTPATIENT)
Dept: PHYSICAL THERAPY | Facility: CLINIC | Age: 77
End: 2022-09-20

## 2022-09-20 DIAGNOSIS — M54.42 CHRONIC MIDLINE LOW BACK PAIN WITH BILATERAL SCIATICA: Primary | ICD-10-CM

## 2022-09-20 DIAGNOSIS — M25.559 PAIN, HIP: ICD-10-CM

## 2022-09-20 DIAGNOSIS — M54.41 CHRONIC MIDLINE LOW BACK PAIN WITH BILATERAL SCIATICA: Primary | ICD-10-CM

## 2022-09-20 DIAGNOSIS — G89.29 CHRONIC MIDLINE LOW BACK PAIN WITH BILATERAL SCIATICA: Primary | ICD-10-CM

## 2022-09-20 DIAGNOSIS — R26.9 GAIT DISTURBANCE: ICD-10-CM

## 2022-09-20 DIAGNOSIS — R29.898 WEAKNESS OF BOTH HIPS: ICD-10-CM

## 2022-09-20 DIAGNOSIS — M54.50 LOW BACK PAIN, UNSPECIFIED BACK PAIN LATERALITY, UNSPECIFIED CHRONICITY, UNSPECIFIED WHETHER SCIATICA PRESENT: ICD-10-CM

## 2022-09-20 DIAGNOSIS — R26.2 DIFFICULTY WALKING: ICD-10-CM

## 2022-09-20 PROCEDURE — 97140 MANUAL THERAPY 1/> REGIONS: CPT | Performed by: PHYSICAL THERAPIST

## 2022-09-20 PROCEDURE — 97530 THERAPEUTIC ACTIVITIES: CPT | Performed by: PHYSICAL THERAPIST

## 2022-09-20 PROCEDURE — 97110 THERAPEUTIC EXERCISES: CPT | Performed by: PHYSICAL THERAPIST

## 2022-09-20 NOTE — PROGRESS NOTES
Physical Therapy Daily Treatment Note        Patient: Radha Cruz   : 1945  Diagnosis/ICD-10 Code:  Chronic midline low back pain with bilateral sciatica [M54.41, M54.42, G89.29]  Referring practitioner: ALONDRA Mayberry  Date of Initial Visit: Type: THERAPY  Noted: 2022  Today's Date: 2022  Patient seen for 5 sessions             Subjective     Radha Cruz reports feeling pretty good today.  She rates her pain at 4/10 upon arrival today.    Objective     + Tightness bilateral Hip/Knee Musculature  + Tightness- Hypomobility Thoracolumbar segmental mobility  + Tightness  Thoracolumbar Paravertebral musculature (Right     more than Left)       See Exercise and Manual Logs for complete treatment.       Assessment/Plan     Pt tolerated therapy session well - with progression of therapeutic exercises, CKC-Functional activities, and Manual therapy. Shehas improved, but continues to have deficits in her Trunk and Bilateral Lower Extremity ROM,  Strength, and Stability; limiting function and ability to perform ADLs at this time.  She has improved, but continues to exhibit general tightness in right lateral trunk and right hip musculature.    Progress per Plan of Care           Timed:  Manual Therapy:    10     mins  56428;  Therapeutic Exercise:    20     mins  36463;     Neuromuscular Kiet:    0    mins  76603;    Therapeutic Activity:     8     mins  26543;     Gait Trainin     mins  64298;     Ultrasound:     0     mins  15928;    Electrical Stimulation:    0     mins  69501;  Iontophoresis     0     mins  15809    Untimed:  Electrical Stimulation:    0     mins  70409 ( );  Mechanical Traction:    0     mins  72203;   Fluidotherapy     0     mins  83718  Hot pack     0     mins  98486  Cold pack     0     mins  49025    Timed Treatment:   38   mins   Total Treatment:     38   mins        Juanita Umaña PTA  Physical Therapist Assistant

## 2022-09-22 ENCOUNTER — TREATMENT (OUTPATIENT)
Dept: PHYSICAL THERAPY | Facility: CLINIC | Age: 77
End: 2022-09-22

## 2022-09-22 DIAGNOSIS — M25.559 PAIN, HIP: ICD-10-CM

## 2022-09-22 DIAGNOSIS — M54.41 CHRONIC MIDLINE LOW BACK PAIN WITH BILATERAL SCIATICA: Primary | ICD-10-CM

## 2022-09-22 DIAGNOSIS — R29.898 WEAKNESS OF BOTH HIPS: ICD-10-CM

## 2022-09-22 DIAGNOSIS — R26.2 DIFFICULTY WALKING: ICD-10-CM

## 2022-09-22 DIAGNOSIS — R26.9 GAIT DISTURBANCE: ICD-10-CM

## 2022-09-22 DIAGNOSIS — G89.29 CHRONIC MIDLINE LOW BACK PAIN WITH BILATERAL SCIATICA: Primary | ICD-10-CM

## 2022-09-22 DIAGNOSIS — M54.50 LOW BACK PAIN, UNSPECIFIED BACK PAIN LATERALITY, UNSPECIFIED CHRONICITY, UNSPECIFIED WHETHER SCIATICA PRESENT: ICD-10-CM

## 2022-09-22 DIAGNOSIS — M54.42 CHRONIC MIDLINE LOW BACK PAIN WITH BILATERAL SCIATICA: Primary | ICD-10-CM

## 2022-09-22 PROCEDURE — 97110 THERAPEUTIC EXERCISES: CPT | Performed by: PHYSICAL THERAPIST

## 2022-09-22 PROCEDURE — 97530 THERAPEUTIC ACTIVITIES: CPT | Performed by: PHYSICAL THERAPIST

## 2022-09-22 PROCEDURE — 97140 MANUAL THERAPY 1/> REGIONS: CPT | Performed by: PHYSICAL THERAPIST

## 2022-09-22 NOTE — PROGRESS NOTES
"Physical Therapy Daily Treatment Note        Patient: Radha Cruz   : 1945  Diagnosis/ICD-10 Code:  Chronic midline low back pain with bilateral sciatica [M54.41, M54.42, G89.29]  Referring practitioner: ALNODRA Mayberry  Date of Initial Visit: Type: THERAPY  Noted: 2022  Today's Date: 2022  Patient seen for 6 sessions             Subjective   Radha Cruz reports having 1-2/10 pain upon arrival.  She reports that the \"Stretches\" helped a lot last session.    Objective     + Tightness bilateral Hip/Knee Musculature  + Tightness- Hypomobility Thoracolumbar segmental mobility  + Tightness  Thoracolumbar Paravertebral musculature (Right     more than Left)       See Exercise and Manual Logs for complete treatment.       Assessment/Plan     Pt tolerated therapy session well - with progression of therapeutic exercises, CKC-Functional activities, and Manual therapy. Shehas improved, but continues to have deficits in her Trunk and Bilateral Lower Extremity ROM,  Strength, and Stability; limiting function and ability to perform ADLs at this time.  She has improved, but continues to exhibit general tightness in right lateral trunk and right hip musculature. Good relief and carryover after Manual therapy.       Progress per Plan of Care           Timed:  Manual Therapy:    10     mins  35306;  Therapeutic Exercise:    20     mins  78983;     Neuromuscular Kiet:    0    mins  28570;    Therapeutic Activity:     8     mins  85152;     Gait Trainin     mins  97730;     Ultrasound:     0     mins  17189;    Electrical Stimulation:    0     mins  03996;  Iontophoresis     0     mins  36579    Untimed:  Electrical Stimulation:    0     mins  52935 ( );  Mechanical Traction:    0     mins  35347;   Fluidotherapy     0     mins  34797  Hot pack     0     mins  76268  Cold pack     0     mins  44818    Timed Treatment:   38   mins   Total Treatment:     38   mins        Juanita" AALIYAH Umaña  Physical Therapist Assistant

## 2022-09-26 ENCOUNTER — TREATMENT (OUTPATIENT)
Dept: PHYSICAL THERAPY | Facility: CLINIC | Age: 77
End: 2022-09-26

## 2022-09-26 DIAGNOSIS — G89.29 CHRONIC MIDLINE LOW BACK PAIN WITH BILATERAL SCIATICA: Primary | ICD-10-CM

## 2022-09-26 DIAGNOSIS — M54.41 CHRONIC MIDLINE LOW BACK PAIN WITH BILATERAL SCIATICA: Primary | ICD-10-CM

## 2022-09-26 DIAGNOSIS — M54.42 CHRONIC MIDLINE LOW BACK PAIN WITH BILATERAL SCIATICA: Primary | ICD-10-CM

## 2022-09-26 DIAGNOSIS — R29.898 WEAKNESS OF BOTH HIPS: ICD-10-CM

## 2022-09-26 DIAGNOSIS — R26.2 DIFFICULTY WALKING: ICD-10-CM

## 2022-09-26 PROCEDURE — 97140 MANUAL THERAPY 1/> REGIONS: CPT | Performed by: PHYSICAL THERAPIST

## 2022-09-26 PROCEDURE — 97110 THERAPEUTIC EXERCISES: CPT | Performed by: PHYSICAL THERAPIST

## 2022-09-26 PROCEDURE — 97530 THERAPEUTIC ACTIVITIES: CPT | Performed by: PHYSICAL THERAPIST

## 2022-09-26 NOTE — PROGRESS NOTES
Progress note    Patient: Radha Cruz   : 1945  Diagnosis/ICD-10 Code:  Chronic midline low back pain with bilateral sciatica [M54.41, M54.42, G89.29]  Referring practitioner: ALONDRA Mayberry  Date of Initial Visit: Type: THERAPY  Noted: 2022  Today's Date: 2022  Patient seen for 7 sessions    Progress note due: 10/26/2022  Re-cert due: 2022      Subjective:   Radha Cruz reports: that she feels that lower back pain has improved since starting therapy but states that she still has intermittent lower back pain limiting function during ADLs.  Pt reports standing and prolonged walking activities increase pain.  Subjective Questionnaire: Oswestry:       Subjective   Objective     Active Range of Motion      Lumbar   Normal active range of motion    Additional Active Range of Motion Details        Strength/Myotome Testing      Left Hip   Planes of Motion   Flexion: 4+  Extension: 4  Abduction: 4     Right Hip   Planes of Motion   Flexion: 4+  Extension: 4  Abduction: 4     Left Knee   Flexion: 5  Extension: 5     Right Knee   Flexion: 5  Extension: 5     Left Ankle/Foot   Dorsiflexion: 5     Right Ankle/Foot   Dorsiflexion: 5  Assessment & Plan     Assessment  Impairments: abnormal gait, abnormal muscle firing, abnormal or restricted ROM, activity intolerance, impaired balance, impaired physical strength, lacks appropriate home exercise program, pain with function, safety issue and weight-bearing intolerance  Functional Limitations: lifting, sleeping, walking, pulling, pushing, uncomfortable because of pain, moving in bed, sitting, standing, stooping and unable to perform repetitive tasks  Assessment details: Patient demonstrates improvements in bilateral hip and core strength and reports of pain during ADLs but still with deficits limiting function during ADLs as shown on the ODELL.  Patient would benefit from skilled PT services in order to address deficits  limiting function at this time.        Prognosis: good    Goals  Plan Goals: 1. The patient complains of low back pain.  LTG 1: 12 weeks:  The patient will report a pain rating of 1/10 or better in order to improve  tolerance to activities of daily living and improve sleep quality.  STATUS:  ongoing  STG 1a: 6 weeks:  The patient will report a pain rating of 3/10 or better.  STATUS:  ongoing  TREATMENT:  Therapeutic exercises, manual therapy, aquatic therapy, home exercise   instruction, and modalities as needed for pain to include:  electrical stimulation, moist heat, ice,   ultrasound, and diathermy.      2. The patient demonstrates weakness of the bilateral hips.  LTG 2: 12 weeks:  The patient will demonstrate 4+ /5 strength for bilateral hip flexion, abduction,  and extension in order to improve hip stability.  STATUS:  ongoing  STG 2a: 6 weeks:  The patient will demonstrate 4 /5 strength for bilateral hip abduction,  and extension.  STATUS:  ongoing  TREATMENT: Therapeutic exercises, manual therapy, aquatic therapy, home exercise instruction,  and modalities as needed for pain to include:  electrical stimulation, moist heat, ice, ultrasound, and   diathermy.      3. The patient has gait dysfunction.  LTG 3: 12 weeks:  The patient will ambulate without assistive device, independently, for   community distances with minimal limp in order to improve mobility and allow   patient to perform activities such as grocery shopping with greater ease.  STATUS:  ongoing  TREATMENT: Gait training, aquatic therapy, therapeutic exercise, and home exercise instruction.    4. Mobility: Walking/Moving Around Functional Limitation    LTG 1: 12 weeks:  The patient will demonstrate 1-19 % limitation by achieving a score of 4/45 on the ODELL.  STATUS:  ongoing  TREATMENT:  Manual therapy, therapeutic exercise, home exercise instruction, and modalities as needed to include: moist heat, electrical stimulation, and  ultrasound.      Plan  Therapy options: will be seen for skilled therapy services  Planned modality interventions: cryotherapy, TENS and thermotherapy (hydrocollator packs)  Planned therapy interventions: balance/weight-bearing training, ADL retraining, soft tissue mobilization, strengthening, stretching, therapeutic activities, joint mobilization, home exercise program, gait training, functional ROM exercises, flexibility, body mechanics training, postural training, neuromuscular re-education, manual therapy and spinal/joint mobilization  Frequency: 2x week  Duration in weeks: 8  Treatment plan discussed with: patient      Progress toward previous goals: ongoing          Recommendations: Continue as planned  Timeframe: 2 months  Prognosis to achieve goals: good    PT SIGNATURE: Sheba Silverman, PT     Electronically signed 2022  DATE TREATMENT INITIATED: 2022  [place] License: [id number]     Certification Period: 2022 thru 2022    Based upon review of the patient's progress and continued therapy plan, it is my medical opinion that Radha Cruz should continue physical therapy treatment at [facility]  [address] [place] [id number]  [phone].    Signature: __________________________________  Carolann Ponce APRN  NPI: [id number]    Timed:  Manual Therapy:    8     mins  44359;  Therapeutic Exercise:    22     mins  78158;     Neuromuscular Kiet:    0    mins  57547;    Therapeutic Activity:     8     mins  73331;     Gait Trainin     mins  98741;     Ultrasound:     0     mins  36233;    Electrical Stimulation:    0     mins  29573 ( );    Untimed:  Electrical Stimulation:    0     mins  19280 ( );  Mechanical Traction:    0     mins  25761;     Timed Treatment:   38   mins   Total Treatment:     42   mins

## 2022-09-28 ENCOUNTER — TREATMENT (OUTPATIENT)
Dept: PHYSICAL THERAPY | Facility: CLINIC | Age: 77
End: 2022-09-28

## 2022-09-28 DIAGNOSIS — R29.898 WEAKNESS OF BOTH HIPS: ICD-10-CM

## 2022-09-28 DIAGNOSIS — G89.29 CHRONIC MIDLINE LOW BACK PAIN WITH BILATERAL SCIATICA: Primary | ICD-10-CM

## 2022-09-28 DIAGNOSIS — M54.42 CHRONIC MIDLINE LOW BACK PAIN WITH BILATERAL SCIATICA: Primary | ICD-10-CM

## 2022-09-28 DIAGNOSIS — M54.41 CHRONIC MIDLINE LOW BACK PAIN WITH BILATERAL SCIATICA: Primary | ICD-10-CM

## 2022-09-28 DIAGNOSIS — R26.2 DIFFICULTY WALKING: ICD-10-CM

## 2022-09-28 PROCEDURE — 97110 THERAPEUTIC EXERCISES: CPT | Performed by: PHYSICAL THERAPIST

## 2022-09-28 PROCEDURE — 97140 MANUAL THERAPY 1/> REGIONS: CPT | Performed by: PHYSICAL THERAPIST

## 2022-09-28 PROCEDURE — 97530 THERAPEUTIC ACTIVITIES: CPT | Performed by: PHYSICAL THERAPIST

## 2022-09-28 NOTE — PROGRESS NOTES
Physical Therapy Daily Treatment Note        Patient: Radha Cruz   : 1945  Diagnosis/ICD-10 Code:  Chronic midline low back pain with bilateral sciatica [M54.41, M54.42, G89.29]  Referring practitioner: ALONDRA Mayberry  Date of Initial Visit: Type: THERAPY  Noted: 2022  Today's Date: 2022  Patient seen for 8 sessions             Subjective   Radha Cruz reports: no pain at beginning of session today.    Objective   Poor PPT activation noted with max verbal and tactile cues  See Exercise, Manual, and Modality Logs for complete treatment.       Assessment/Plan  Patient tolerated all exercise progressions and manual therapy well today but continues to demonstrate bilateral hip/core strength deficits limiting function. PPT activation is limited even with max cues.  Continue to progress per patient tolerance.    Progress per Plan of Care           Timed:  Manual Therapy:    8     mins  56825;  Therapeutic Exercise:    22     mins  06251;     Neuromuscular Kiet:    0    mins  40992;    Therapeutic Activity:     8     mins  03972;     Gait Trainin     mins  58074;     Ultrasound:     0     mins  75805;    Electrical Stimulation:    0     mins  80699;  Iontophoresis     0     mins  39587    Untimed:  Electrical Stimulation:    0     mins  83314 ( );  Mechanical Traction:    0     mins  87047;   Fluidotherapy     0     mins  19423  Hot pack     0     Mins    Cold pack                       0     Mins     Timed Treatment:   38   mins   Total Treatment:    38   mins        Sheba Silverman PT    Electronically signed [unfilled]  [place] License: [id number]  NPI number: [id number]

## 2022-10-05 ENCOUNTER — TREATMENT (OUTPATIENT)
Dept: PHYSICAL THERAPY | Facility: CLINIC | Age: 77
End: 2022-10-05

## 2022-10-05 DIAGNOSIS — R29.898 WEAKNESS OF BOTH HIPS: ICD-10-CM

## 2022-10-05 DIAGNOSIS — R26.2 DIFFICULTY WALKING: ICD-10-CM

## 2022-10-05 DIAGNOSIS — M54.42 CHRONIC MIDLINE LOW BACK PAIN WITH BILATERAL SCIATICA: Primary | ICD-10-CM

## 2022-10-05 DIAGNOSIS — G89.29 CHRONIC MIDLINE LOW BACK PAIN WITH BILATERAL SCIATICA: Primary | ICD-10-CM

## 2022-10-05 DIAGNOSIS — M54.41 CHRONIC MIDLINE LOW BACK PAIN WITH BILATERAL SCIATICA: Primary | ICD-10-CM

## 2022-10-05 PROCEDURE — 97110 THERAPEUTIC EXERCISES: CPT | Performed by: PHYSICAL THERAPIST

## 2022-10-05 PROCEDURE — 97140 MANUAL THERAPY 1/> REGIONS: CPT | Performed by: PHYSICAL THERAPIST

## 2022-10-05 NOTE — PROGRESS NOTES
Physical Therapy Daily Treatment Note        Patient: Radha Cruz   : 1945  Diagnosis/ICD-10 Code:  Chronic midline low back pain with bilateral sciatica [M54.41, M54.42, G89.29]  Referring practitioner: Michael Dia MD  Date of Initial Visit: Type: THERAPY  Noted: 2022  Today's Date: 10/5/2022  Patient seen for 9 sessions             Subjective   Radha Cruz reports: no pain at beginning of session today.    Objective   See Exercise, Manual, and Modality Logs for complete treatment.       Assessment/Plan  Patient tolerated all exercise progressions and manual therapy well today but continues to demonstrate bilateral hip/core strength deficits limiting function. PPT activation is continues to be limited even with max cues.  Continue to progress per patient tolerance.  Progress per Plan of Care           Timed:  Manual Therapy:    8     mins  78005;  Therapeutic Exercise:    22     mins  06048;     Neuromuscular Kiet:    0    mins  74949;    Therapeutic Activity:     0     mins  21664;     Gait Trainin     mins  37918;     Ultrasound:     0     mins  38452;    Electrical Stimulation:    0     mins  97352;  Iontophoresis     0     mins  61272    Untimed:  Electrical Stimulation:    0     mins  36074 (MC );  Mechanical Traction:    0     mins  21529;   Fluidotherapy     0     mins  91253  Hot pack     0     Mins    Cold pack                       0     Mins     Timed Treatment:   30   mins   Total Treatment:     38   mins        Sheba Silverman PT    Electronically signed [unfilled]  [place] License: [id number]  NPI number: [id number]

## 2022-10-06 DIAGNOSIS — R05.9 COUGH: ICD-10-CM

## 2022-10-06 RX ORDER — DEXTROMETHORPHAN HYDROBROMIDE AND PROMETHAZINE HYDROCHLORIDE 15; 6.25 MG/5ML; MG/5ML
SYRUP ORAL
Qty: 118 ML | Refills: 0 | OUTPATIENT
Start: 2022-10-06

## 2022-10-09 DIAGNOSIS — R05.9 COUGH: ICD-10-CM

## 2022-10-10 ENCOUNTER — TREATMENT (OUTPATIENT)
Dept: PHYSICAL THERAPY | Facility: CLINIC | Age: 77
End: 2022-10-10

## 2022-10-10 DIAGNOSIS — M54.42 CHRONIC MIDLINE LOW BACK PAIN WITH BILATERAL SCIATICA: Primary | ICD-10-CM

## 2022-10-10 DIAGNOSIS — B37.2 YEAST DERMATITIS: ICD-10-CM

## 2022-10-10 DIAGNOSIS — I10 BENIGN ESSENTIAL HYPERTENSION: Primary | ICD-10-CM

## 2022-10-10 DIAGNOSIS — R26.2 DIFFICULTY WALKING: ICD-10-CM

## 2022-10-10 DIAGNOSIS — R29.898 WEAKNESS OF BOTH HIPS: ICD-10-CM

## 2022-10-10 DIAGNOSIS — G89.29 CHRONIC MIDLINE LOW BACK PAIN WITH BILATERAL SCIATICA: Primary | ICD-10-CM

## 2022-10-10 DIAGNOSIS — M54.41 CHRONIC MIDLINE LOW BACK PAIN WITH BILATERAL SCIATICA: Primary | ICD-10-CM

## 2022-10-10 PROCEDURE — 97110 THERAPEUTIC EXERCISES: CPT | Performed by: PHYSICAL THERAPIST

## 2022-10-10 RX ORDER — FELODIPINE 10 MG/1
10 TABLET, EXTENDED RELEASE ORAL DAILY
Qty: 90 TABLET | Refills: 0 | Status: SHIPPED | OUTPATIENT
Start: 2022-10-10 | End: 2023-01-12

## 2022-10-10 RX ORDER — CLOTRIMAZOLE 1 %
1 CREAM (GRAM) TOPICAL 2 TIMES DAILY
Qty: 60 G | Refills: 0 | Status: SHIPPED | OUTPATIENT
Start: 2022-10-10 | End: 2022-11-23

## 2022-10-10 RX ORDER — DEXTROMETHORPHAN HYDROBROMIDE AND PROMETHAZINE HYDROCHLORIDE 15; 6.25 MG/5ML; MG/5ML
SYRUP ORAL
Qty: 118 ML | Refills: 0 | Status: SHIPPED | OUTPATIENT
Start: 2022-10-10 | End: 2022-11-10

## 2022-10-10 NOTE — PROGRESS NOTES
Physical Therapy Daily Treatment Note        Patient: Radha Cruz   : 1945  Diagnosis/ICD-10 Code:  Chronic midline low back pain with bilateral sciatica [M54.41, M54.42, G89.29]  Referring practitioner: Michael Dia MD  Date of Initial Visit: Type: THERAPY  Noted: 2022  Today's Date: 10/10/2022  Patient seen for 10 sessions             Subjective   Radha Cruz reports: mild lower back pain today.  Pt reports that she has to leave early this session.    Objective   Bilateral hip abduction MMt: 4-/5  See Exercise, Manual, and Modality Logs for complete treatment.       Assessment/Plan  Patient tolerated all exercise progressions and manual therapy well today but continues to demonstrate bilateral hip/core strength deficits limiting function. Therapy activities limited due to pt having to leave appt early.  Continue to progress per patient tolerance.  Progress per Plan of Care           Timed:  Manual Therapy:    0     mins  15255;  Therapeutic Exercise:    17     mins  41880;     Neuromuscular Kiet:    0    mins  30283;    Therapeutic Activity:     0     mins  23645;     Gait Trainin     mins  23545;     Ultrasound:     0     mins  72339;    Electrical Stimulation:    0     mins  81078;  Iontophoresis     0     mins  28282    Untimed:  Electrical Stimulation:    0     mins  27065 ( );  Mechanical Traction:    0     mins  46352;   Fluidotherapy     0     mins  20087  Hot pack     0     Mins    Cold pack                       0     Mins     Timed Treatment:   17   mins   Total Treatment:     17   mins        Sheba Silverman PT    Electronically signed [unfilled]  [place] License: [id number]  NPI number: [id number]

## 2022-10-17 ENCOUNTER — TREATMENT (OUTPATIENT)
Dept: PHYSICAL THERAPY | Facility: CLINIC | Age: 77
End: 2022-10-17

## 2022-10-17 DIAGNOSIS — R29.898 WEAKNESS OF BOTH HIPS: ICD-10-CM

## 2022-10-17 DIAGNOSIS — G89.29 CHRONIC MIDLINE LOW BACK PAIN WITH BILATERAL SCIATICA: Primary | ICD-10-CM

## 2022-10-17 DIAGNOSIS — M54.42 CHRONIC MIDLINE LOW BACK PAIN WITH BILATERAL SCIATICA: Primary | ICD-10-CM

## 2022-10-17 DIAGNOSIS — M25.559 PAIN, HIP: ICD-10-CM

## 2022-10-17 DIAGNOSIS — R26.2 DIFFICULTY WALKING: ICD-10-CM

## 2022-10-17 DIAGNOSIS — M54.41 CHRONIC MIDLINE LOW BACK PAIN WITH BILATERAL SCIATICA: Primary | ICD-10-CM

## 2022-10-17 PROCEDURE — 97110 THERAPEUTIC EXERCISES: CPT | Performed by: PHYSICAL THERAPIST

## 2022-10-17 PROCEDURE — 97530 THERAPEUTIC ACTIVITIES: CPT | Performed by: PHYSICAL THERAPIST

## 2022-10-17 PROCEDURE — 97140 MANUAL THERAPY 1/> REGIONS: CPT | Performed by: PHYSICAL THERAPIST

## 2022-10-17 NOTE — PROGRESS NOTES
Physical Therapy Daily Treatment Note        Patient: Radha Cruz   : 1945  Diagnosis/ICD-10 Code:  Chronic midline low back pain with bilateral sciatica [M54.41, M54.42, G89.29]  Referring practitioner: Michael Dia MD  Date of Initial Visit: Type: THERAPY  Noted: 2022  Today's Date: 10/17/2022  Patient seen for 11 sessions             Subjective   Radha Cruz reports having 0-2/10 pain in her right lower back and into right lateral knee.    Objective     + Tightness bilateral Hip/Knee Musculature  + Tightness- Hypomobility Thoracolumbar segmental mobility  + Tightness  Thoracolumbar Paravertebral musculature (Right     more than Left)        See Exercise and Manual Logs for complete treatment.       Assessment/Plan   Pt tolerated therapy session well - with progression of therapeutic exercises, CKC-Functional activities, and Manual therapy. Shehas improved, but continues to have deficits in her Trunk and Bilateral Lower Extremity ROM,  Strength, and Stability; limiting function and ability to perform ADLs at this time.  She has improved, but continues to exhibit general tightness in right lateral trunk and right hip musculature. Good relief and carryover after Manual therapy    Progress per Plan of Care           Timed:  Manual Therapy:    10     mins  22675;  Therapeutic Exercise:    20     mins  44820;     Neuromuscular Kiet:    0    mins  66370;    Therapeutic Activity:     8     mins  35275;     Gait Trainin     mins  60078;     Ultrasound:     0     mins  23678;    Electrical Stimulation:    0     mins  90244;  Iontophoresis     0     mins  79833    Untimed:  Electrical Stimulation:    0     mins  32564 ( );  Mechanical Traction:    0     mins  39992;   Fluidotherapy     0     mins  82964  Hot pack     0     mins  86772  Cold pack     0     mins  23107    Timed Treatment:   38   mins   Total Treatment:     38   mins        Juanita Umaña PTA  Physical  Therapist Assistant

## 2022-10-18 DIAGNOSIS — I67.2 CEREBRAL ATHEROSCLEROSIS: ICD-10-CM

## 2022-10-18 RX ORDER — CLOPIDOGREL BISULFATE 75 MG/1
75 TABLET ORAL DAILY
Qty: 90 TABLET | Refills: 0 | Status: SHIPPED | OUTPATIENT
Start: 2022-10-18 | End: 2022-12-21

## 2022-10-21 DIAGNOSIS — E78.5 DYSLIPIDEMIA: ICD-10-CM

## 2022-10-21 RX ORDER — ATORVASTATIN CALCIUM 40 MG/1
40 TABLET, FILM COATED ORAL NIGHTLY
Qty: 90 TABLET | Refills: 0 | Status: SHIPPED | OUTPATIENT
Start: 2022-10-21 | End: 2023-01-19

## 2022-10-21 NOTE — TELEPHONE ENCOUNTER
Caller: Radha Cruz    Relationship: Self    Best call back number:[phone]    Requested Prescriptions:   Requested Prescriptions     Pending Prescriptions Disp Refills   • atorvastatin (LIPITOR) 40 MG tablet 90 tablet 0     Sig: Take 1 tablet by mouth Every Night.        Pharmacy where request should be sent: [address] [phone] [facility] [phone] FX     Does the patient have less than a 3 day supply:  [x] Yes  [] No    Jimenez Villegas Rep   10/21/22 09:21 EDT

## 2022-10-27 ENCOUNTER — TREATMENT (OUTPATIENT)
Dept: PHYSICAL THERAPY | Facility: CLINIC | Age: 77
End: 2022-10-27

## 2022-10-27 DIAGNOSIS — M54.41 CHRONIC MIDLINE LOW BACK PAIN WITH BILATERAL SCIATICA: Primary | ICD-10-CM

## 2022-10-27 DIAGNOSIS — R26.2 DIFFICULTY WALKING: ICD-10-CM

## 2022-10-27 DIAGNOSIS — R29.898 WEAKNESS OF BOTH HIPS: ICD-10-CM

## 2022-10-27 DIAGNOSIS — G89.29 CHRONIC MIDLINE LOW BACK PAIN WITH BILATERAL SCIATICA: Primary | ICD-10-CM

## 2022-10-27 DIAGNOSIS — M54.42 CHRONIC MIDLINE LOW BACK PAIN WITH BILATERAL SCIATICA: Primary | ICD-10-CM

## 2022-10-27 PROCEDURE — 97110 THERAPEUTIC EXERCISES: CPT | Performed by: PHYSICAL THERAPIST

## 2022-10-27 PROCEDURE — 97140 MANUAL THERAPY 1/> REGIONS: CPT | Performed by: PHYSICAL THERAPIST

## 2022-10-27 NOTE — PROGRESS NOTES
Progress note    Patient: Radha Cruz   : 1945  Diagnosis/ICD-10 Code:  Chronic midline low back pain with bilateral sciatica [M54.41, M54.42, G89.29]  Referring practitioner: Michael Dia MD  Date of Initial Visit: Type: THERAPY  Noted: 2022  Today's Date: 10/27/2022  Patient seen for 12 sessions    Progress note due: 2022  Re-cert due: 2023      Subjective:   Radha Cruz reports: 3/10 lower back pain at beginning of session today.  Pt reports overall improvements in pain and function during ADLs and is able to complete all ADLs with decreased pain and difficulty.  Pt continues to reports intermittent lower back pain.  Due to pain/functional improvements the pt is comfortable with D/C and will continue HEP.  Subjective Questionnaire: Oswestry:       Subjective   Objective   Active Range of Motion      Lumbar   Normal active range of motion    Additional Active Range of Motion Details        Strength/Myotome Testing      Left Hip   Planes of Motion   Flexion: 4+  Extension: 4+  Abduction: 4+     Right Hip   Planes of Motion   Flexion: 4+  Extension: 4+  Abduction: 4+     Left Knee   Flexion: 5  Extension: 5     Right Knee   Flexion: 5  Extension: 5     Left Ankle/Foot   Dorsiflexion: 5     Right Ankle/Foot   Dorsiflexion: 5    Assessment & Plan     Assessment    Assessment details: Patient demonstrates improvements in bilateral hip and core strength and reports of pain/function during ADLs.  Due to pain and functional improvements and meeting most therapy goals the pt will be discharged at this time and will continue HEP.  Pt is comfortable with this plan.     Goals  Plan Goals: 1. The patient complains of low back pain.  LTG 1: 12 weeks:  The patient will report a pain rating of 1/10 or better in order to improve  tolerance to activities of daily living and improve sleep quality.  STATUS:  Not consistently met  STG 1a: 6 weeks:  The patient will report a pain  rating of 3/10 or better.  STATUS:  met  TREATMENT:  Therapeutic exercises, manual therapy, aquatic therapy, home exercise   instruction, and modalities as needed for pain to include:  electrical stimulation, moist heat, ice,   ultrasound, and diathermy.      2. The patient demonstrates weakness of the bilateral hips.  LTG 2: 12 weeks:  The patient will demonstrate 4+ /5 strength for bilateral hip flexion, abduction,  and extension in order to improve hip stability.  STATUS:  met  STG 2a: 6 weeks:  The patient will demonstrate 4 /5 strength for bilateral hip abduction,  and extension.  STATUS:  met  TREATMENT: Therapeutic exercises, manual therapy, aquatic therapy, home exercise instruction,  and modalities as needed for pain to include:  electrical stimulation, moist heat, ice, ultrasound, and   diathermy.      3. The patient has gait dysfunction.  LTG 3: 12 weeks:  The patient will ambulate without assistive device, independently, for   community distances with minimal limp in order to improve mobility and allow   patient to perform activities such as grocery shopping with greater ease.  STATUS:  met  TREATMENT: Gait training, aquatic therapy, therapeutic exercise, and home exercise instruction.    4. Mobility: Walking/Moving Around Functional Limitation                   LTG 1: 12 weeks:  The patient will demonstrate 1-19 % limitation by achieving a score of 4/45 on the ODELL.  STATUS:  Not met  TREATMENT:  Manual therapy, therapeutic exercise, home exercise instruction, and modalities as needed to include: moist heat, electrical stimulation, and ultrasound.    Plan  Therapy options: will not be seen for skilled therapy services      Progress toward previous goals: Partially Met          Recommendations: Discharge     PT SIGNATURE: Sheba Silverman PT     Electronically signed 10/27/2022  DATE TREATMENT INITIATED: 10/27/2022  [place] License: [id number]     Certification Period: 10/27/2022 thru 1/24/2023    Based upon review  of the patient's progress and continued therapy plan, it is my medical opinion that Radha Cruz should continue physical therapy treatment at [facility]   [address] [address] [phone].    Signature: __________________________________  Michael Dia MD  NPI: [id number]    Timed:  Manual Therapy:    8     mins  00624;  Therapeutic Exercise:    22     mins  07419;     Neuromuscular Kiet:    0    mins  92990;    Therapeutic Activity:     0     mins  11919;     Gait Trainin     mins  67821;     Ultrasound:     0     mins  75331;    Electrical Stimulation:    0     mins  69662 ( );    Untimed:  Electrical Stimulation:    0     mins  52664 ( );  Mechanical Traction:    0     mins  81590;     Timed Treatment:   30   mins   Total Treatment:     30   mins

## 2022-11-10 DIAGNOSIS — R05.9 COUGH: ICD-10-CM

## 2022-11-10 RX ORDER — DEXTROMETHORPHAN HYDROBROMIDE AND PROMETHAZINE HYDROCHLORIDE 15; 6.25 MG/5ML; MG/5ML
SYRUP ORAL
Qty: 118 ML | Refills: 0 | Status: SHIPPED | OUTPATIENT
Start: 2022-11-10 | End: 2022-12-21

## 2022-11-23 DIAGNOSIS — B37.2 YEAST DERMATITIS: ICD-10-CM

## 2022-11-23 DIAGNOSIS — Z91.09 ENVIRONMENTAL ALLERGIES: ICD-10-CM

## 2022-11-23 RX ORDER — MONTELUKAST SODIUM 10 MG/1
TABLET ORAL
Qty: 90 TABLET | Refills: 0 | Status: SHIPPED | OUTPATIENT
Start: 2022-11-23 | End: 2023-03-09

## 2022-11-23 RX ORDER — CLOTRIMAZOLE 1 %
CREAM (GRAM) TOPICAL
Qty: 60 G | Refills: 0 | Status: SHIPPED | OUTPATIENT
Start: 2022-11-23 | End: 2023-01-12

## 2022-11-27 DIAGNOSIS — E87.6 HYPOKALEMIA: ICD-10-CM

## 2022-11-27 RX ORDER — POTASSIUM CHLORIDE 20MEQ/15ML
LIQUID (ML) ORAL
Qty: 480 ML | Refills: 0 | Status: SHIPPED | OUTPATIENT
Start: 2022-11-27 | End: 2023-01-19

## 2022-12-21 DIAGNOSIS — E11.29 TYPE 2 DIABETES MELLITUS WITH MICROALBUMINURIA, WITHOUT LONG-TERM CURRENT USE OF INSULIN: ICD-10-CM

## 2022-12-21 DIAGNOSIS — I67.2 CEREBRAL ATHEROSCLEROSIS: ICD-10-CM

## 2022-12-21 DIAGNOSIS — R80.9 TYPE 2 DIABETES MELLITUS WITH MICROALBUMINURIA, WITHOUT LONG-TERM CURRENT USE OF INSULIN: ICD-10-CM

## 2022-12-21 DIAGNOSIS — R05.9 COUGH: ICD-10-CM

## 2022-12-21 RX ORDER — DEXTROMETHORPHAN HYDROBROMIDE AND PROMETHAZINE HYDROCHLORIDE 15; 6.25 MG/5ML; MG/5ML
SYRUP ORAL
Qty: 118 ML | Refills: 0 | Status: SHIPPED | OUTPATIENT
Start: 2022-12-21 | End: 2023-02-17 | Stop reason: SDUPTHER

## 2022-12-21 RX ORDER — CLOPIDOGREL BISULFATE 75 MG/1
TABLET ORAL
Qty: 90 TABLET | Refills: 0 | Status: SHIPPED | OUTPATIENT
Start: 2022-12-21 | End: 2023-03-06 | Stop reason: SDUPTHER

## 2023-01-12 DIAGNOSIS — B37.2 YEAST DERMATITIS: ICD-10-CM

## 2023-01-12 DIAGNOSIS — I10 BENIGN ESSENTIAL HYPERTENSION: ICD-10-CM

## 2023-01-12 RX ORDER — CLOTRIMAZOLE 1 %
CREAM (GRAM) TOPICAL
Qty: 60 G | Refills: 0 | Status: SHIPPED | OUTPATIENT
Start: 2023-01-12

## 2023-01-12 RX ORDER — FELODIPINE 10 MG/1
TABLET, EXTENDED RELEASE ORAL
Qty: 90 TABLET | Refills: 0 | Status: SHIPPED | OUTPATIENT
Start: 2023-01-12 | End: 2023-04-09

## 2023-01-19 DIAGNOSIS — E87.6 HYPOKALEMIA: ICD-10-CM

## 2023-01-19 DIAGNOSIS — E78.5 DYSLIPIDEMIA: ICD-10-CM

## 2023-01-19 RX ORDER — POTASSIUM CHLORIDE 20MEQ/15ML
LIQUID (ML) ORAL
Qty: 480 ML | Refills: 0 | Status: SHIPPED | OUTPATIENT
Start: 2023-01-19 | End: 2023-01-22 | Stop reason: SDUPTHER

## 2023-01-19 RX ORDER — ATORVASTATIN CALCIUM 40 MG/1
TABLET, FILM COATED ORAL
Qty: 90 TABLET | Refills: 0 | Status: SHIPPED | OUTPATIENT
Start: 2023-01-19 | End: 2023-03-06 | Stop reason: SDUPTHER

## 2023-01-22 DIAGNOSIS — E87.6 HYPOKALEMIA: ICD-10-CM

## 2023-01-22 RX ORDER — POTASSIUM CHLORIDE 20MEQ/15ML
LIQUID (ML) ORAL
Qty: 480 ML | Refills: 0 | Status: SHIPPED | OUTPATIENT
Start: 2023-01-22

## 2023-01-28 DIAGNOSIS — R05.9 COUGH: ICD-10-CM

## 2023-01-30 RX ORDER — DEXTROMETHORPHAN HYDROBROMIDE AND PROMETHAZINE HYDROCHLORIDE 15; 6.25 MG/5ML; MG/5ML
SYRUP ORAL
Qty: 118 ML | Refills: 0 | OUTPATIENT
Start: 2023-01-30

## 2023-02-07 DIAGNOSIS — R05.9 COUGH: ICD-10-CM

## 2023-02-07 RX ORDER — DEXTROMETHORPHAN HYDROBROMIDE AND PROMETHAZINE HYDROCHLORIDE 15; 6.25 MG/5ML; MG/5ML
SYRUP ORAL
Qty: 118 ML | Refills: 0 | OUTPATIENT
Start: 2023-02-07

## 2023-02-14 DIAGNOSIS — R05.9 COUGH: ICD-10-CM

## 2023-02-14 RX ORDER — DEXTROMETHORPHAN HYDROBROMIDE AND PROMETHAZINE HYDROCHLORIDE 15; 6.25 MG/5ML; MG/5ML
SYRUP ORAL
Qty: 118 ML | Refills: 0 | OUTPATIENT
Start: 2023-02-14

## 2023-02-14 NOTE — TELEPHONE ENCOUNTER
Caller: Radha Cruz    Relationship: Self    Best call back number: [phone]    Requested Prescriptions:   Requested Prescriptions     Pending Prescriptions Disp Refills   • promethazine-dextromethorphan (PROMETHAZINE-DM) 6.25-15 MG/5ML syrup 118 mL 0        Pharmacy where request should be sent: [address] [phone] [facility] [phone] FX     Additional details provided by patient: PATIENT STATED SHE HAS BEEN OUT FOR TWO WEEKS AND TAKES IT AS NEEDED. PLEASE ADVISE.     Does the patient have less than a 3 day supply:  [x] Yes  [] No    Would you like a call back once the refill request has been completed: [] Yes [x] No    If the office needs to give you a call back, can they leave a voicemail: [] Yes [x] No    Sia Underwood, Jimenez Rep   02/14/23 10:18 EST

## 2023-02-17 DIAGNOSIS — R05.9 COUGH: ICD-10-CM

## 2023-02-17 RX ORDER — DEXTROMETHORPHAN HYDROBROMIDE AND PROMETHAZINE HYDROCHLORIDE 15; 6.25 MG/5ML; MG/5ML
5 SYRUP ORAL 4 TIMES DAILY PRN
Qty: 118 ML | Refills: 0 | Status: SHIPPED | OUTPATIENT
Start: 2023-02-17 | End: 2023-02-27 | Stop reason: SDUPTHER

## 2023-02-23 DIAGNOSIS — R00.2 PALPITATIONS: ICD-10-CM

## 2023-02-23 RX ORDER — METOPROLOL SUCCINATE 100 MG/1
TABLET, EXTENDED RELEASE ORAL
Qty: 90 TABLET | Refills: 0 | Status: SHIPPED | OUTPATIENT
Start: 2023-02-23 | End: 2023-03-06 | Stop reason: SDUPTHER

## 2023-02-27 DIAGNOSIS — R05.9 COUGH: ICD-10-CM

## 2023-02-27 RX ORDER — DEXTROMETHORPHAN HYDROBROMIDE AND PROMETHAZINE HYDROCHLORIDE 15; 6.25 MG/5ML; MG/5ML
5 SYRUP ORAL 4 TIMES DAILY PRN
Qty: 118 ML | Refills: 0 | Status: SHIPPED | OUTPATIENT
Start: 2023-02-27

## 2023-03-06 ENCOUNTER — OFFICE VISIT (OUTPATIENT)
Dept: FAMILY MEDICINE CLINIC | Facility: CLINIC | Age: 78
End: 2023-03-06
Payer: MEDICARE

## 2023-03-06 VITALS
SYSTOLIC BLOOD PRESSURE: 122 MMHG | HEART RATE: 72 BPM | HEIGHT: 65 IN | WEIGHT: 154 LBS | RESPIRATION RATE: 18 BRPM | BODY MASS INDEX: 25.66 KG/M2 | OXYGEN SATURATION: 97 % | TEMPERATURE: 96.9 F | DIASTOLIC BLOOD PRESSURE: 72 MMHG

## 2023-03-06 DIAGNOSIS — E87.6 HYPOKALEMIA: ICD-10-CM

## 2023-03-06 DIAGNOSIS — E11.29 TYPE 2 DIABETES MELLITUS WITH MICROALBUMINURIA, WITHOUT LONG-TERM CURRENT USE OF INSULIN: ICD-10-CM

## 2023-03-06 DIAGNOSIS — I10 BENIGN ESSENTIAL HYPERTENSION: Primary | ICD-10-CM

## 2023-03-06 DIAGNOSIS — E78.5 DYSLIPIDEMIA: ICD-10-CM

## 2023-03-06 DIAGNOSIS — Z12.31 SCREENING MAMMOGRAM FOR BREAST CANCER: ICD-10-CM

## 2023-03-06 DIAGNOSIS — R60.0 LOWER EXTREMITY EDEMA: ICD-10-CM

## 2023-03-06 DIAGNOSIS — I67.2 CEREBRAL ATHEROSCLEROSIS: ICD-10-CM

## 2023-03-06 DIAGNOSIS — L81.9 DISCOLORATION OF SKIN: ICD-10-CM

## 2023-03-06 DIAGNOSIS — R00.2 PALPITATIONS: ICD-10-CM

## 2023-03-06 DIAGNOSIS — R80.9 TYPE 2 DIABETES MELLITUS WITH MICROALBUMINURIA, WITHOUT LONG-TERM CURRENT USE OF INSULIN: ICD-10-CM

## 2023-03-06 PROCEDURE — 99214 OFFICE O/P EST MOD 30 MIN: CPT

## 2023-03-06 RX ORDER — FUROSEMIDE 20 MG/1
20 TABLET ORAL DAILY
Qty: 90 TABLET | Refills: 1
Start: 2023-03-06

## 2023-03-06 RX ORDER — METOPROLOL SUCCINATE 100 MG/1
100 TABLET, EXTENDED RELEASE ORAL DAILY
Qty: 90 TABLET | Refills: 0 | Status: SHIPPED | OUTPATIENT
Start: 2023-03-06

## 2023-03-06 RX ORDER — ATORVASTATIN CALCIUM 40 MG/1
40 TABLET, FILM COATED ORAL NIGHTLY
Qty: 90 TABLET | Refills: 1 | Status: SHIPPED | OUTPATIENT
Start: 2023-03-06

## 2023-03-06 RX ORDER — CLOPIDOGREL BISULFATE 75 MG/1
75 TABLET ORAL DAILY
Qty: 90 TABLET | Refills: 1 | Status: SHIPPED | OUTPATIENT
Start: 2023-03-06

## 2023-03-06 RX ORDER — LOSARTAN POTASSIUM 100 MG/1
100 TABLET ORAL DAILY
Qty: 90 TABLET | Refills: 1 | Status: SHIPPED | OUTPATIENT
Start: 2023-03-06

## 2023-03-06 NOTE — PROGRESS NOTES
"Subjective   Radha Cruz is a 77 y.o. female. Who presents for routine follow up      History of Present Illness     DM2: taking metformin BID- was advised to increase to TID but states only takes 3 some days as does not like to take meds. .   Denies neuropathy, vision changes. Does not check BG at home  Has eye exam scheduled  Watching diet. . Does not eat fast foods, packaged foods. Home cooked meals usually     Hx HLD- atherosclerosis- doing well on statin and plavix. No concerns   HTN-  BP stable. States compliance with meds. Not checking BP at home. BP stable today  On metoprolol for hx palpitations- has not had episodes recently. Doing well on medication with no issues.     Brown discoloration of L shoulder- no new symptoms has had since last year. Topical steroids helped some but did not resolve. No associated symptoms    Does not want any vaccines  Mammogram due    The following portions of the patient's history were reviewed and updated as appropriate: allergies, current medications, past family history, past medical history, past social history and past surgical history.    Review of Systems   Constitutional: Negative for fatigue, fever and unexpected weight loss.   Eyes: Negative for visual disturbance.   Respiratory: Negative.    Cardiovascular: Negative.    Gastrointestinal: Negative for abdominal pain, constipation and diarrhea.   Genitourinary: Negative for difficulty urinating.   Neurological: Negative for dizziness and headache.   Psychiatric/Behavioral: Negative for sleep disturbance and depressed mood. The patient is not nervous/anxious.        Objective    /72 (BP Location: Right arm, Patient Position: Sitting, Cuff Size: Adult)   Pulse 72   Temp 96.9 °F (36.1 °C) (Temporal)   Resp 18   Ht 165.1 cm (65\")   Wt 69.9 kg (154 lb)   SpO2 97%   BMI 25.63 kg/m²     Physical Exam  Constitutional:       Appearance: Normal appearance. She is not ill-appearing.   Cardiovascular:     "  Rate and Rhythm: Normal rate and regular rhythm.      Pulses: Normal pulses.      Heart sounds: Normal heart sounds, S1 normal and S2 normal. No murmur heard.  Pulmonary:      Effort: Pulmonary effort is normal. No respiratory distress.      Breath sounds: Normal breath sounds.   Musculoskeletal:      Right lower leg: No edema.      Left lower leg: No edema.   Neurological:      General: No focal deficit present.      Mental Status: She is alert and oriented to person, place, and time.      Cranial Nerves: No dysarthria.      Gait: Gait is intact.   Psychiatric:         Attention and Perception: Attention normal.         Mood and Affect: Mood normal.         Speech: Speech normal.         Behavior: Behavior normal.         Thought Content: Thought content normal.         Cognition and Memory: Cognition normal.         Judgment: Judgment normal.           Assessment & Plan   Diagnoses and all orders for this visit:    1. Benign essential hypertension (Primary)  -     CBC & Differential  -     Comprehensive Metabolic Panel  -     TSH  -     losartan (COZAAR) 100 MG tablet; Take 1 tablet by mouth Daily.  Dispense: 90 tablet; Refill: 1  Stable. Cont same tx.     2. Type 2 diabetes mellitus with microalbuminuria, without long-term current use of insulin (HCC)  -     Hemoglobin A1c  -     Microalbumin / Creatinine Urine Ratio - Urine, Clean Catch  -     metFORMIN (GLUCOPHAGE) 500 MG tablet; Take 1 tablet by mouth 3 (Three) Times a Day With Meals. NULL  Dispense: 270 tablet; Refill: 1  Update monitoring. Cont same tx.   Continue working on healthy diet and exercise.     3. Hypokalemia  -     Comprehensive Metabolic Panel    4. Dyslipidemia  -     Lipid Panel  -     atorvastatin (LIPITOR) 40 MG tablet; Take 1 tablet by mouth Every Night.  Dispense: 90 tablet; Refill: 1    5. Lower extremity edema  -     furosemide (Lasix) 20 MG tablet; Take 1 tablet by mouth Daily.  Dispense: 90 tablet; Refill: 1  No edema on exam. Stable  on current tx. Cont same. update labs.     6. Cerebral atherosclerosis  -     clopidogrel (PLAVIX) 75 MG tablet; Take 1 tablet by mouth Daily.  Dispense: 90 tablet; Refill: 1  Stable. Continue statin and plavix    7. Palpitations  -     metoprolol succinate XL (TOPROL-XL) 100 MG 24 hr tablet; Take 1 tablet by mouth Daily.  Dispense: 90 tablet; Refill: 0  Asymptomatic. Cont same tx    8. Screening mammogram for breast cancer  -     Mammo Screening Bilateral With CAD; Future    9. Discoloration of skin  -     Ambulatory Referral to Dermatology  Stop topical tx. Will refer to derm for further advise as long time issue        Advised pt that I will be leaving practice in about 3 weeks. If needs refills/questions/concerns call prior.   Advise to start looking for new PCP. List of [facility] provided. May also call in 2-3 months to see if new provider in this office.     Needs follow up about 6 months as long as labs stable       - Pt agrees with plan of care and states no further concerns or questions today    This document is intended for medical expert use only. Reading of this document by patients and/or patient's family without participating medical staff guidance may result in misinterpretation and unintended morbidity.  Any interpretation of such data is the responsibility of the patient and/or family member responsible for the patient in concert with their primary or specialist providers, not to be left for sources of online searches such as [employer], [facility] or similar queries. Relying on these approaches to knowledge may result in misinterpretation, misguided goals of care and even death should patients or family members try recommendations outside of the realm of professional medical care in a supervised way.     Please allow 3-5 business days for recommendations based on new results     Go to the ER for any possible lifethreatening symptoms such as chest pain or shortness of air.      I personally spent 25  minutes with this patient, preparing for the visit, reviewing tests, obtaining and/or reviewing a separately obtained history, performing a medically appropriate examination and/or evaluation , counseling and educating the patient/family/caregiver, ordering medications, tests, or procedures, documenting information in the medical record and independently interpreting results.

## 2023-03-07 LAB
ALBUMIN SERPL-MCNC: 4.2 G/DL (ref 3.5–5.2)
ALBUMIN/CREAT UR: 96 MG/G CREAT (ref 0–29)
ALBUMIN/GLOB SERPL: 1.1 G/DL
ALP SERPL-CCNC: 97 U/L (ref 39–117)
ALT SERPL-CCNC: 28 U/L (ref 1–33)
AST SERPL-CCNC: 30 U/L (ref 1–32)
BASOPHILS # BLD AUTO: 0.05 10*3/MM3 (ref 0–0.2)
BASOPHILS NFR BLD AUTO: 0.6 % (ref 0–1.5)
BILIRUB SERPL-MCNC: 0.7 MG/DL (ref 0–1.2)
BUN SERPL-MCNC: 9 MG/DL (ref 8–23)
BUN/CREAT SERPL: 11.3 (ref 7–25)
CALCIUM SERPL-MCNC: 9.2 MG/DL (ref 8.6–10.5)
CHLORIDE SERPL-SCNC: 99 MMOL/L (ref 98–107)
CHOLEST SERPL-MCNC: 95 MG/DL (ref 0–200)
CO2 SERPL-SCNC: 34.1 MMOL/L (ref 22–29)
CREAT SERPL-MCNC: 0.8 MG/DL (ref 0.57–1)
CREAT UR-MCNC: 172.5 MG/DL
EGFRCR SERPLBLD CKD-EPI 2021: 76.9 ML/MIN/1.73
EOSINOPHIL # BLD AUTO: 0.42 10*3/MM3 (ref 0–0.4)
EOSINOPHIL NFR BLD AUTO: 5.2 % (ref 0.3–6.2)
ERYTHROCYTE [DISTWIDTH] IN BLOOD BY AUTOMATED COUNT: 14.2 % (ref 12.3–15.4)
GLOBULIN SER CALC-MCNC: 3.8 GM/DL
GLUCOSE SERPL-MCNC: 150 MG/DL (ref 65–99)
HBA1C MFR BLD: 6.1 % (ref 4.8–5.6)
HCT VFR BLD AUTO: 39.8 % (ref 34–46.6)
HDLC SERPL-MCNC: 37 MG/DL (ref 40–60)
HGB BLD-MCNC: 13.3 G/DL (ref 12–15.9)
IMM GRANULOCYTES # BLD AUTO: 0.02 10*3/MM3 (ref 0–0.05)
IMM GRANULOCYTES NFR BLD AUTO: 0.2 % (ref 0–0.5)
LDLC SERPL CALC-MCNC: 37 MG/DL (ref 0–100)
LYMPHOCYTES # BLD AUTO: 1.8 10*3/MM3 (ref 0.7–3.1)
LYMPHOCYTES NFR BLD AUTO: 22.2 % (ref 19.6–45.3)
MCH RBC QN AUTO: 29.9 PG (ref 26.6–33)
MCHC RBC AUTO-ENTMCNC: 33.4 G/DL (ref 31.5–35.7)
MCV RBC AUTO: 89.4 FL (ref 79–97)
MICROALBUMIN UR-MCNC: 165.2 UG/ML
MONOCYTES # BLD AUTO: 0.76 10*3/MM3 (ref 0.1–0.9)
MONOCYTES NFR BLD AUTO: 9.4 % (ref 5–12)
NEUTROPHILS # BLD AUTO: 5.07 10*3/MM3 (ref 1.7–7)
NEUTROPHILS NFR BLD AUTO: 62.4 % (ref 42.7–76)
NRBC BLD AUTO-RTO: 0 /100 WBC (ref 0–0.2)
PLATELET # BLD AUTO: 235 10*3/MM3 (ref 140–450)
POTASSIUM SERPL-SCNC: 4.3 MMOL/L (ref 3.5–5.2)
PROT SERPL-MCNC: 8 G/DL (ref 6–8.5)
RBC # BLD AUTO: 4.45 10*6/MM3 (ref 3.77–5.28)
SODIUM SERPL-SCNC: 138 MMOL/L (ref 136–145)
TRIGL SERPL-MCNC: 112 MG/DL (ref 0–150)
TSH SERPL DL<=0.005 MIU/L-ACNC: 0.93 UIU/ML (ref 0.27–4.2)
VLDLC SERPL CALC-MCNC: 21 MG/DL (ref 5–40)
WBC # BLD AUTO: 8.12 10*3/MM3 (ref 3.4–10.8)

## 2023-03-08 NOTE — PROGRESS NOTES
Please call pt with lab results    Blood levels within normal limits  Kidney, liver and thyroid function stable  Cholesterol well controlled  A1c has improved. Continue metformin two tablets a day,   Potassium is normal. Would stay off potassium supplement since has been off of them and has done well.     Repeat labs in 6 months. Call if questions.

## 2023-03-09 ENCOUNTER — TRANSCRIBE ORDERS (OUTPATIENT)
Dept: FAMILY MEDICINE CLINIC | Facility: CLINIC | Age: 78
End: 2023-03-09
Payer: MEDICARE

## 2023-03-09 DIAGNOSIS — Z12.31 SCREENING MAMMOGRAM FOR BREAST CANCER: Primary | ICD-10-CM

## 2023-03-09 DIAGNOSIS — Z91.09 ENVIRONMENTAL ALLERGIES: ICD-10-CM

## 2023-03-09 RX ORDER — MONTELUKAST SODIUM 10 MG/1
TABLET ORAL
Qty: 90 TABLET | Refills: 0 | Status: SHIPPED | OUTPATIENT
Start: 2023-03-09

## 2023-03-17 ENCOUNTER — HOSPITAL ENCOUNTER (OUTPATIENT)
Dept: MAMMOGRAPHY | Facility: HOSPITAL | Age: 78
Discharge: HOME | End: 2023-03-17
Payer: MEDICARE

## 2023-03-17 DIAGNOSIS — Z12.31 SCREENING MAMMOGRAM FOR BREAST CANCER: ICD-10-CM

## 2023-03-17 PROCEDURE — 77063 BREAST TOMOSYNTHESIS BI: CPT

## 2023-03-17 PROCEDURE — 77067 SCR MAMMO BI INCL CAD: CPT

## 2023-04-08 DIAGNOSIS — I10 BENIGN ESSENTIAL HYPERTENSION: ICD-10-CM

## 2023-04-09 RX ORDER — FELODIPINE 10 MG/1
TABLET, EXTENDED RELEASE ORAL
Qty: 90 TABLET | Refills: 0 | Status: SHIPPED | OUTPATIENT
Start: 2023-04-09

## 2023-04-13 ENCOUNTER — DOCUMENTATION (OUTPATIENT)
Dept: FAMILY MEDICINE CLINIC | Facility: CLINIC | Age: 78
End: 2023-04-13
Payer: MEDICARE

## 2023-04-14 DIAGNOSIS — R05.9 COUGH: ICD-10-CM

## 2023-04-14 RX ORDER — DEXTROMETHORPHAN HYDROBROMIDE AND PROMETHAZINE HYDROCHLORIDE 15; 6.25 MG/5ML; MG/5ML
SYRUP ORAL
Qty: 118 ML | Refills: 0 | Status: SHIPPED | OUTPATIENT
Start: 2023-04-14

## 2023-05-15 DIAGNOSIS — E87.6 HYPOKALEMIA: ICD-10-CM

## 2023-05-15 RX ORDER — POTASSIUM CHLORIDE 20MEQ/15ML
LIQUID (ML) ORAL
Qty: 480 ML | Refills: 0 | Status: SHIPPED | OUTPATIENT
Start: 2023-05-15

## 2023-06-13 DIAGNOSIS — E87.6 HYPOKALEMIA: ICD-10-CM

## 2023-06-13 RX ORDER — POTASSIUM CHLORIDE 20MEQ/15ML
LIQUID (ML) ORAL
Qty: 480 ML | Refills: 0 | Status: SHIPPED | OUTPATIENT
Start: 2023-06-13

## 2023-07-24 DIAGNOSIS — R05.9 COUGH: ICD-10-CM

## 2023-07-24 RX ORDER — DEXTROMETHORPHAN HYDROBROMIDE AND PROMETHAZINE HYDROCHLORIDE 15; 6.25 MG/5ML; MG/5ML
SYRUP ORAL
Qty: 118 ML | Refills: 0 | Status: SHIPPED | OUTPATIENT
Start: 2023-07-24

## 2023-08-04 ENCOUNTER — OFFICE VISIT (OUTPATIENT)
Dept: FAMILY MEDICINE CLINIC | Facility: CLINIC | Age: 78
End: 2023-08-04
Payer: MEDICARE

## 2023-08-04 VITALS
DIASTOLIC BLOOD PRESSURE: 68 MMHG | RESPIRATION RATE: 16 BRPM | HEART RATE: 57 BPM | OXYGEN SATURATION: 99 % | TEMPERATURE: 96.9 F | BODY MASS INDEX: 26.33 KG/M2 | HEIGHT: 65 IN | WEIGHT: 158 LBS | SYSTOLIC BLOOD PRESSURE: 138 MMHG

## 2023-08-04 DIAGNOSIS — E08.43 DIABETES MELLITUS DUE TO UNDERLYING CONDITION WITH DIABETIC AUTONOMIC NEUROPATHY, WITHOUT LONG-TERM CURRENT USE OF INSULIN: ICD-10-CM

## 2023-08-04 DIAGNOSIS — Z13.220 LIPID SCREENING: ICD-10-CM

## 2023-08-04 DIAGNOSIS — Z00.00 ENCOUNTER FOR MEDICARE ANNUAL WELLNESS EXAM: Primary | ICD-10-CM

## 2023-08-04 LAB
ALBUMIN SERPL-MCNC: 4 G/DL (ref 3.5–5.2)
ALBUMIN/GLOB SERPL: 0.9 G/DL
ALP SERPL-CCNC: 85 U/L (ref 39–117)
ALT SERPL-CCNC: 39 U/L (ref 1–33)
AST SERPL-CCNC: 40 U/L (ref 1–32)
BASOPHILS # BLD AUTO: 0.06 10*3/MM3 (ref 0–0.2)
BASOPHILS NFR BLD AUTO: 0.6 % (ref 0–1.5)
BILIRUB SERPL-MCNC: 0.7 MG/DL (ref 0–1.2)
BUN SERPL-MCNC: 15 MG/DL (ref 8–23)
BUN/CREAT SERPL: 16.7 (ref 7–25)
CALCIUM SERPL-MCNC: 9.8 MG/DL (ref 8.6–10.5)
CHLORIDE SERPL-SCNC: 99 MMOL/L (ref 98–107)
CHOLEST SERPL-MCNC: 110 MG/DL (ref 0–200)
CO2 SERPL-SCNC: 31.4 MMOL/L (ref 22–29)
CREAT SERPL-MCNC: 0.9 MG/DL (ref 0.57–1)
EGFRCR SERPLBLD CKD-EPI 2021: 66.8 ML/MIN/1.73
EOSINOPHIL # BLD AUTO: 0.33 10*3/MM3 (ref 0–0.4)
EOSINOPHIL NFR BLD AUTO: 3.4 % (ref 0.3–6.2)
ERYTHROCYTE [DISTWIDTH] IN BLOOD BY AUTOMATED COUNT: 13.3 % (ref 12.3–15.4)
GLOBULIN SER CALC-MCNC: 4.7 GM/DL
GLUCOSE SERPL-MCNC: 118 MG/DL (ref 65–99)
HBA1C MFR BLD: 6.3 % (ref 4.8–5.6)
HCT VFR BLD AUTO: 43.7 % (ref 34–46.6)
HDLC SERPL-MCNC: 37 MG/DL (ref 40–60)
HGB BLD-MCNC: 14.7 G/DL (ref 12–15.9)
IMM GRANULOCYTES # BLD AUTO: 0.03 10*3/MM3 (ref 0–0.05)
IMM GRANULOCYTES NFR BLD AUTO: 0.3 % (ref 0–0.5)
LDLC SERPL CALC-MCNC: 52 MG/DL (ref 0–100)
LYMPHOCYTES # BLD AUTO: 2.17 10*3/MM3 (ref 0.7–3.1)
LYMPHOCYTES NFR BLD AUTO: 22.4 % (ref 19.6–45.3)
MCH RBC QN AUTO: 29.9 PG (ref 26.6–33)
MCHC RBC AUTO-ENTMCNC: 33.6 G/DL (ref 31.5–35.7)
MCV RBC AUTO: 89 FL (ref 79–97)
MONOCYTES # BLD AUTO: 0.97 10*3/MM3 (ref 0.1–0.9)
MONOCYTES NFR BLD AUTO: 10 % (ref 5–12)
NEUTROPHILS # BLD AUTO: 6.13 10*3/MM3 (ref 1.7–7)
NEUTROPHILS NFR BLD AUTO: 63.3 % (ref 42.7–76)
NRBC BLD AUTO-RTO: 0 /100 WBC (ref 0–0.2)
PLATELET # BLD AUTO: 245 10*3/MM3 (ref 140–450)
POTASSIUM SERPL-SCNC: 4.6 MMOL/L (ref 3.5–5.2)
PROT SERPL-MCNC: 8.7 G/DL (ref 6–8.5)
RBC # BLD AUTO: 4.91 10*6/MM3 (ref 3.77–5.28)
SODIUM SERPL-SCNC: 142 MMOL/L (ref 136–145)
TRIGL SERPL-MCNC: 118 MG/DL (ref 0–150)
VLDLC SERPL CALC-MCNC: 21 MG/DL (ref 5–40)
WBC # BLD AUTO: 9.69 10*3/MM3 (ref 3.4–10.8)

## 2023-08-04 RX ORDER — KETOCONAZOLE 20 MG/ML
1 SHAMPOO TOPICAL WEEKLY
COMMUNITY
Start: 2023-05-05

## 2023-08-15 DIAGNOSIS — E87.6 HYPOKALEMIA: ICD-10-CM

## 2023-08-15 RX ORDER — POTASSIUM CHLORIDE 20MEQ/15ML
LIQUID (ML) ORAL
Qty: 480 ML | Refills: 0 | Status: SHIPPED | OUTPATIENT
Start: 2023-08-15

## 2023-08-25 DIAGNOSIS — R05.9 COUGH: ICD-10-CM

## 2023-08-25 RX ORDER — DEXTROMETHORPHAN HYDROBROMIDE AND PROMETHAZINE HYDROCHLORIDE 15; 6.25 MG/5ML; MG/5ML
SYRUP ORAL
Qty: 118 ML | Refills: 0 | Status: SHIPPED | OUTPATIENT
Start: 2023-08-25

## 2023-08-28 DIAGNOSIS — R00.2 PALPITATIONS: ICD-10-CM

## 2023-08-28 RX ORDER — METOPROLOL SUCCINATE 100 MG/1
100 TABLET, EXTENDED RELEASE ORAL DAILY
Qty: 90 TABLET | Refills: 0 | Status: SHIPPED | OUTPATIENT
Start: 2023-08-28

## 2023-10-02 ENCOUNTER — TELEPHONE (OUTPATIENT)
Dept: FAMILY MEDICINE CLINIC | Facility: CLINIC | Age: 78
End: 2023-10-02
Payer: MEDICARE

## 2023-10-02 DIAGNOSIS — R00.2 PALPITATIONS: ICD-10-CM

## 2023-10-02 DIAGNOSIS — R05.9 COUGH: ICD-10-CM

## 2023-10-02 RX ORDER — METOPROLOL SUCCINATE 100 MG/1
100 TABLET, EXTENDED RELEASE ORAL DAILY
Qty: 90 TABLET | Refills: 0 | Status: SHIPPED | OUTPATIENT
Start: 2023-10-02

## 2023-10-02 RX ORDER — DEXTROMETHORPHAN HYDROBROMIDE AND PROMETHAZINE HYDROCHLORIDE 15; 6.25 MG/5ML; MG/5ML
SYRUP ORAL
Qty: 118 ML | Refills: 0 | Status: SHIPPED | OUTPATIENT
Start: 2023-10-02

## 2023-10-17 DIAGNOSIS — I10 BENIGN ESSENTIAL HYPERTENSION: ICD-10-CM

## 2023-10-17 RX ORDER — LOSARTAN POTASSIUM 100 MG/1
100 TABLET ORAL DAILY
Qty: 90 TABLET | Refills: 1 | Status: SHIPPED | OUTPATIENT
Start: 2023-10-17

## 2023-10-17 NOTE — TELEPHONE ENCOUNTER
Last Apt: 08-   Next Apt: 02-     [facility]

## 2023-10-20 ENCOUNTER — TELEPHONE (OUTPATIENT)
Dept: FAMILY MEDICINE CLINIC | Facility: CLINIC | Age: 78
End: 2023-10-20
Payer: MEDICARE

## 2023-10-22 DIAGNOSIS — I10 BENIGN ESSENTIAL HYPERTENSION: ICD-10-CM

## 2023-10-23 RX ORDER — FELODIPINE 10 MG/1
TABLET, EXTENDED RELEASE ORAL
Qty: 90 TABLET | Refills: 0 | Status: SHIPPED | OUTPATIENT
Start: 2023-10-23

## 2023-10-31 DIAGNOSIS — I67.2 CEREBRAL ATHEROSCLEROSIS: ICD-10-CM

## 2023-10-31 DIAGNOSIS — E87.6 HYPOKALEMIA: ICD-10-CM

## 2023-10-31 DIAGNOSIS — E78.5 DYSLIPIDEMIA: ICD-10-CM

## 2023-10-31 RX ORDER — ATORVASTATIN CALCIUM 40 MG/1
40 TABLET, FILM COATED ORAL NIGHTLY
Qty: 90 TABLET | Refills: 1 | Status: SHIPPED | OUTPATIENT
Start: 2023-10-31

## 2023-10-31 RX ORDER — POTASSIUM CHLORIDE 20MEQ/15ML
LIQUID (ML) ORAL
Qty: 480 ML | Refills: 1 | Status: SHIPPED | OUTPATIENT
Start: 2023-10-31

## 2023-10-31 NOTE — TELEPHONE ENCOUNTER
Caller: Radha Cruz    Relationship: Self    Best call back number: [phone] (Mobile)     Requested Prescriptions:   Requested Prescriptions     Pending Prescriptions Disp Refills    atorvastatin (LIPITOR) 40 MG tablet 90 tablet 1     Sig: Take 1 tablet by mouth Every Night.        Pharmacy where request should be sent: [address] [phone] [facility] [phone] [facility]     Last office visit with prescribing clinician: 8/4/2023   Last telemedicine visit with prescribing clinician: Visit date not found   Next office visit with prescribing clinician: 2/9/2024     Additional details provided by patient: COMPLETELY OUT OF MEDICATION     Does the patient have less than a 3 day supply:  [x] Yes  [] No    Would you like a call back once the refill request has been completed: X Yes [] No    If the office needs to give you a call back, can they leave a voicemail: [x] Yes [] No    Jimenez Aviles Rep   10/31/23 13:17 EDT

## 2023-11-09 DIAGNOSIS — R05.9 COUGH: ICD-10-CM

## 2023-11-09 RX ORDER — DEXTROMETHORPHAN HYDROBROMIDE AND PROMETHAZINE HYDROCHLORIDE 15; 6.25 MG/5ML; MG/5ML
SYRUP ORAL
Qty: 118 ML | Refills: 0 | OUTPATIENT
Start: 2023-11-09

## 2023-11-15 ENCOUNTER — TELEPHONE (OUTPATIENT)
Dept: FAMILY MEDICINE CLINIC | Facility: CLINIC | Age: 78
End: 2023-11-15
Payer: MEDICARE

## 2023-11-15 DIAGNOSIS — R05.9 COUGH: ICD-10-CM

## 2023-11-15 RX ORDER — DEXTROMETHORPHAN POLISTIREX 30 MG/5ML
60 SUSPENSION ORAL EVERY 12 HOURS SCHEDULED
Qty: 280 ML | Refills: 1 | Status: SHIPPED | OUTPATIENT
Start: 2023-11-15

## 2023-11-15 NOTE — TELEPHONE ENCOUNTER
Pt called asking why we wouldn't refill her Promethazine cough syrup. I told her I wasn't sure but I'd look into it. I did go ahead and ask why she is still needing it, does she still have a cough and she informed me that she takes this for her allergies. Stated she cannot take any of the OTC meds. Please advise or send refill. Thanks.    This medication is no longer being sold.  Production has been stopped.  It is no longer available to prescribe.

## 2023-11-28 DIAGNOSIS — R80.9 TYPE 2 DIABETES MELLITUS WITH MICROALBUMINURIA, WITHOUT LONG-TERM CURRENT USE OF INSULIN: ICD-10-CM

## 2023-11-28 DIAGNOSIS — E11.29 TYPE 2 DIABETES MELLITUS WITH MICROALBUMINURIA, WITHOUT LONG-TERM CURRENT USE OF INSULIN: ICD-10-CM

## 2023-11-29 DIAGNOSIS — R00.2 PALPITATIONS: ICD-10-CM

## 2023-11-29 RX ORDER — METOPROLOL SUCCINATE 100 MG/1
100 TABLET, EXTENDED RELEASE ORAL DAILY
Qty: 90 TABLET | Refills: 0 | Status: SHIPPED | OUTPATIENT
Start: 2023-11-29

## 2023-12-18 DIAGNOSIS — R60.0 LOWER EXTREMITY EDEMA: ICD-10-CM

## 2023-12-18 RX ORDER — FUROSEMIDE 20 MG/1
20 TABLET ORAL DAILY
Qty: 90 TABLET | Refills: 0 | Status: SHIPPED | OUTPATIENT
Start: 2023-12-18

## 2023-12-19 DIAGNOSIS — I67.2 CEREBRAL ATHEROSCLEROSIS: ICD-10-CM

## 2023-12-19 RX ORDER — CLOPIDOGREL BISULFATE 75 MG/1
75 TABLET ORAL DAILY
Qty: 90 TABLET | Refills: 1 | Status: SHIPPED | OUTPATIENT
Start: 2023-12-19

## 2023-12-29 ENCOUNTER — OFFICE VISIT (OUTPATIENT)
Dept: FAMILY MEDICINE CLINIC | Facility: CLINIC | Age: 78
End: 2023-12-29
Payer: MEDICARE

## 2023-12-29 VITALS
HEIGHT: 65 IN | HEART RATE: 62 BPM | SYSTOLIC BLOOD PRESSURE: 180 MMHG | OXYGEN SATURATION: 97 % | WEIGHT: 160.4 LBS | BODY MASS INDEX: 26.73 KG/M2 | DIASTOLIC BLOOD PRESSURE: 70 MMHG

## 2023-12-29 DIAGNOSIS — T56.0X1A ACUTE LEAD-INDUCED GOUT INVOLVING TOE OF RIGHT FOOT, INITIAL ENCOUNTER: Primary | ICD-10-CM

## 2023-12-29 DIAGNOSIS — M10.171 ACUTE LEAD-INDUCED GOUT INVOLVING TOE OF RIGHT FOOT, INITIAL ENCOUNTER: Primary | ICD-10-CM

## 2023-12-29 DIAGNOSIS — R60.0 LOWER EXTREMITY EDEMA: ICD-10-CM

## 2023-12-29 RX ORDER — COLCHICINE 0.6 MG/1
0.6 TABLET ORAL DAILY
Qty: 30 TABLET | Refills: 5 | Status: SHIPPED | OUTPATIENT
Start: 2023-12-29

## 2023-12-29 RX ORDER — FUROSEMIDE 20 MG/1
20 TABLET ORAL DAILY
Qty: 90 TABLET | Refills: 0 | Status: SHIPPED | OUTPATIENT
Start: 2023-12-29

## 2023-12-29 NOTE — PROGRESS NOTES
"Chief Complaint  right big toe pain (X 2 weeks)    Subjective        Radha Cruz presents to [facility]  History of Present Illness  Radha has had bilateral lower extremity edema off and on for awhile.  She currently takes lasix for this; however, over the past 2 weeks, she has had increased pain in her right great toe. There is significant swelling noted today, 1+ pitting.        Objective   Vital Signs:  /70   Pulse 62   Ht 165.1 cm (65\")   Wt 72.8 kg (160 lb 6.4 oz)   SpO2 97%   BMI 26.69 kg/m²   Estimated body mass index is 26.69 kg/m² as calculated from the following:    Height as of this encounter: 165.1 cm (65\").    Weight as of this encounter: 72.8 kg (160 lb 6.4 oz).       Physical Exam  Constitutional:       Appearance: Normal appearance.   HENT:      Head: Normocephalic and atraumatic.      Nose: Nose normal.      Mouth/Throat:      Mouth: Mucous membranes are moist.   Eyes:      Pupils: Pupils are equal, round, and reactive to light.   Cardiovascular:      Rate and Rhythm: Normal rate and regular rhythm.      Pulses: Normal pulses.      Heart sounds: Normal heart sounds.   Pulmonary:      Effort: Pulmonary effort is normal.      Breath sounds: Normal breath sounds.   Abdominal:      General: Bowel sounds are normal.   Musculoskeletal:         General: Normal range of motion.   Feet:      Comments: Painful right great toe  Skin:     General: Skin is warm and dry.      Capillary Refill: Capillary refill takes less than 2 seconds.   Neurological:      General: No focal deficit present.      Mental Status: She is alert.   Psychiatric:         Mood and Affect: Mood normal.             Assessment and Plan Diagnoses and all orders for this visit:    1. Acute gout involving toe of right foot, initial encounter (Primary)  -     colchicine 0.6 MG tablet; Take 1 tablet by mouth Daily.  Dispense: 30 tablet; Refill: 5    2. Lower extremity edema  -     " furosemide (LASIX) 20 MG tablet; Take 1 tablet by mouth Daily.  Dispense: 90 tablet; Refill: 0    Discussed Care Gaps, ordered referrals and encouraged vaccination updates.       - Pt agrees with plan of care and denies further questions/concerns today  - This document is intended for medical expert use only. Persons  reading this document without medical staff guidance may result in misinterpretation and unintended morbidity     Go to the ER for any possible life-threatening symptoms such as chest pain or shortness of air.      Please allow 3-5 business days for recommendations based on new results      I personally spent time with this patient, preparing for the visit, reviewing tests, obtaining and/or reviewing a separately obtained history, performing a medically appropriate examination and/or evaluation, counseling and educating the patient/family/caregiver, ordering medications,  documenting information in the medical record and indepentently interpreting results.          Follow Up   Return in about 6 months (around 6/29/2024).  Patient was given instructions and counseling regarding her condition or for health maintenance advice. Please see specific information pulled into the AVS if appropriate.

## 2024-01-24 DIAGNOSIS — I10 BENIGN ESSENTIAL HYPERTENSION: ICD-10-CM

## 2024-01-24 RX ORDER — FELODIPINE 10 MG/1
TABLET, EXTENDED RELEASE ORAL
Qty: 90 TABLET | Refills: 0 | Status: SHIPPED | OUTPATIENT
Start: 2024-01-24

## 2024-01-30 DIAGNOSIS — E87.6 HYPOKALEMIA: ICD-10-CM

## 2024-01-31 RX ORDER — POTASSIUM CHLORIDE 20MEQ/15ML
LIQUID (ML) ORAL
Qty: 480 ML | Refills: 0 | Status: SHIPPED | OUTPATIENT
Start: 2024-01-31

## 2024-02-09 ENCOUNTER — OFFICE VISIT (OUTPATIENT)
Dept: FAMILY MEDICINE CLINIC | Facility: CLINIC | Age: 79
End: 2024-02-09
Payer: MEDICARE

## 2024-02-09 VITALS
HEART RATE: 60 BPM | OXYGEN SATURATION: 97 % | BODY MASS INDEX: 25.92 KG/M2 | SYSTOLIC BLOOD PRESSURE: 126 MMHG | WEIGHT: 155.6 LBS | HEIGHT: 65 IN | DIASTOLIC BLOOD PRESSURE: 74 MMHG

## 2024-02-09 DIAGNOSIS — I67.2 CEREBRAL ATHEROSCLEROSIS: ICD-10-CM

## 2024-02-09 DIAGNOSIS — M10.171 ACUTE LEAD-INDUCED GOUT INVOLVING TOE OF RIGHT FOOT, INITIAL ENCOUNTER: ICD-10-CM

## 2024-02-09 DIAGNOSIS — R00.2 PALPITATIONS: ICD-10-CM

## 2024-02-09 DIAGNOSIS — T56.0X1A ACUTE LEAD-INDUCED GOUT INVOLVING TOE OF RIGHT FOOT, INITIAL ENCOUNTER: ICD-10-CM

## 2024-02-09 DIAGNOSIS — R80.9 TYPE 2 DIABETES MELLITUS WITH MICROALBUMINURIA, WITHOUT LONG-TERM CURRENT USE OF INSULIN: ICD-10-CM

## 2024-02-09 DIAGNOSIS — M15.2 BOUCHARD NODES (DJD HAND): ICD-10-CM

## 2024-02-09 DIAGNOSIS — I10 BENIGN ESSENTIAL HYPERTENSION: ICD-10-CM

## 2024-02-09 DIAGNOSIS — E03.9 ACQUIRED HYPOTHYROIDISM: ICD-10-CM

## 2024-02-09 DIAGNOSIS — R60.0 BILATERAL EDEMA OF LOWER EXTREMITY: Primary | ICD-10-CM

## 2024-02-09 DIAGNOSIS — E78.5 DYSLIPIDEMIA: ICD-10-CM

## 2024-02-09 DIAGNOSIS — E11.29 TYPE 2 DIABETES MELLITUS WITH MICROALBUMINURIA, WITHOUT LONG-TERM CURRENT USE OF INSULIN: ICD-10-CM

## 2024-02-09 DIAGNOSIS — J30.2 SEASONAL ALLERGIC RHINITIS, UNSPECIFIED TRIGGER: ICD-10-CM

## 2024-02-09 RX ORDER — FUROSEMIDE 20 MG/1
10 TABLET ORAL DAILY
Qty: 90 TABLET | Refills: 0 | Status: SHIPPED | OUTPATIENT
Start: 2024-02-09 | End: 2024-02-10 | Stop reason: SDUPTHER

## 2024-02-09 RX ORDER — ATORVASTATIN CALCIUM 40 MG/1
40 TABLET, FILM COATED ORAL NIGHTLY
Qty: 90 TABLET | Refills: 1 | Status: SHIPPED | OUTPATIENT
Start: 2024-02-09

## 2024-02-09 RX ORDER — FELODIPINE 10 MG/1
10 TABLET, EXTENDED RELEASE ORAL DAILY
Qty: 90 TABLET | Refills: 0 | Status: SHIPPED | OUTPATIENT
Start: 2024-02-09

## 2024-02-09 RX ORDER — ATORVASTATIN CALCIUM 40 MG/1
40 TABLET, FILM COATED ORAL NIGHTLY
Qty: 90 TABLET | Refills: 1 | Status: SHIPPED | OUTPATIENT
Start: 2024-02-09 | End: 2024-02-09 | Stop reason: SDUPTHER

## 2024-02-09 RX ORDER — COLCHICINE 0.6 MG/1
0.6 TABLET ORAL DAILY
Qty: 30 TABLET | Refills: 5 | Status: SHIPPED | OUTPATIENT
Start: 2024-02-09

## 2024-02-09 RX ORDER — CLOPIDOGREL BISULFATE 75 MG/1
75 TABLET ORAL DAILY
Qty: 90 TABLET | Refills: 1 | Status: SHIPPED | OUTPATIENT
Start: 2024-02-09

## 2024-02-09 RX ORDER — LOSARTAN POTASSIUM 100 MG/1
100 TABLET ORAL DAILY
Qty: 90 TABLET | Refills: 1 | Status: SHIPPED | OUTPATIENT
Start: 2024-02-09 | End: 2024-02-10 | Stop reason: SDUPTHER

## 2024-02-09 RX ORDER — MAGNESIUM OXIDE 400 MG/1
400 TABLET ORAL DAILY
Qty: 90 TABLET | Refills: 1 | Status: SHIPPED | OUTPATIENT
Start: 2024-02-09

## 2024-02-09 RX ORDER — MELOXICAM 7.5 MG/1
7.5 TABLET ORAL DAILY
Qty: 90 TABLET | Refills: 0 | Status: SHIPPED | OUTPATIENT
Start: 2024-02-09

## 2024-02-10 LAB
ALBUMIN SERPL-MCNC: 4.5 G/DL (ref 3.8–4.8)
ALBUMIN/GLOB SERPL: 1.2 {RATIO} (ref 1.2–2.2)
ALP SERPL-CCNC: 80 IU/L (ref 44–121)
ALT SERPL-CCNC: 26 IU/L (ref 0–32)
AST SERPL-CCNC: 36 IU/L (ref 0–40)
BASOPHILS # BLD AUTO: 0.1 X10E3/UL (ref 0–0.2)
BASOPHILS NFR BLD AUTO: 1 %
BILIRUB SERPL-MCNC: 0.8 MG/DL (ref 0–1.2)
BUN SERPL-MCNC: 11 MG/DL (ref 8–27)
BUN/CREAT SERPL: 13 (ref 12–28)
CALCIUM SERPL-MCNC: 9.3 MG/DL (ref 8.7–10.3)
CHLORIDE SERPL-SCNC: 100 MMOL/L (ref 96–106)
CHOLEST SERPL-MCNC: 106 MG/DL (ref 100–199)
CO2 SERPL-SCNC: 21 MMOL/L (ref 20–29)
CREAT SERPL-MCNC: 0.84 MG/DL (ref 0.57–1)
EGFRCR SERPLBLD CKD-EPI 2021: 72 ML/MIN/1.73
EOSINOPHIL # BLD AUTO: 0.4 X10E3/UL (ref 0–0.4)
EOSINOPHIL NFR BLD AUTO: 5 %
ERYTHROCYTE [DISTWIDTH] IN BLOOD BY AUTOMATED COUNT: 13.9 % (ref 11.7–15.4)
FT4I SERPL CALC-MCNC: 2.6 (ref 1.2–4.9)
GLOBULIN SER CALC-MCNC: 3.9 G/DL (ref 1.5–4.5)
GLUCOSE SERPL-MCNC: NORMAL MG/DL
HBA1C MFR BLD: 6.4 % (ref 4.8–5.6)
HCT VFR BLD AUTO: 42.9 % (ref 34–46.6)
HDLC SERPL-MCNC: 32 MG/DL
HGB BLD-MCNC: 14.3 G/DL (ref 11.1–15.9)
IMM GRANULOCYTES # BLD AUTO: 0 X10E3/UL (ref 0–0.1)
IMM GRANULOCYTES NFR BLD AUTO: 0 %
LDLC SERPL CALC-MCNC: 54 MG/DL (ref 0–99)
LYMPHOCYTES # BLD AUTO: 1.7 X10E3/UL (ref 0.7–3.1)
LYMPHOCYTES NFR BLD AUTO: 24 %
MCH RBC QN AUTO: 30.4 PG (ref 26.6–33)
MCHC RBC AUTO-ENTMCNC: 33.3 G/DL (ref 31.5–35.7)
MCV RBC AUTO: 91 FL (ref 79–97)
MONOCYTES # BLD AUTO: 0.7 X10E3/UL (ref 0.1–0.9)
MONOCYTES NFR BLD AUTO: 10 %
NEUTROPHILS # BLD AUTO: 4 X10E3/UL (ref 1.4–7)
NEUTROPHILS NFR BLD AUTO: 60 %
PLATELET # BLD AUTO: 227 X10E3/UL (ref 150–450)
POTASSIUM SERPL-SCNC: NORMAL MMOL/L
PROT SERPL-MCNC: 8.4 G/DL (ref 6–8.5)
RBC # BLD AUTO: 4.7 X10E6/UL (ref 3.77–5.28)
SODIUM SERPL-SCNC: 141 MMOL/L (ref 134–144)
T3RU NFR SERPL: 27 % (ref 24–39)
T4 SERPL-MCNC: 9.8 UG/DL (ref 4.5–12)
TRIGL SERPL-MCNC: 105 MG/DL (ref 0–149)
TSH SERPL DL<=0.005 MIU/L-ACNC: 1.19 UIU/ML (ref 0.45–4.5)
VLDLC SERPL CALC-MCNC: 20 MG/DL (ref 5–40)
WBC # BLD AUTO: 6.8 X10E3/UL (ref 3.4–10.8)

## 2024-02-10 RX ORDER — LOSARTAN POTASSIUM 100 MG/1
100 TABLET ORAL DAILY
Qty: 90 TABLET | Refills: 1 | Status: SHIPPED | OUTPATIENT
Start: 2024-02-10

## 2024-02-10 RX ORDER — METOPROLOL SUCCINATE 100 MG/1
100 TABLET, EXTENDED RELEASE ORAL DAILY
Qty: 90 TABLET | Refills: 0 | Status: SHIPPED | OUTPATIENT
Start: 2024-02-10

## 2024-02-10 RX ORDER — FUROSEMIDE 20 MG/1
10 TABLET ORAL DAILY
Qty: 90 TABLET | Refills: 0 | Status: SHIPPED | OUTPATIENT
Start: 2024-02-10

## 2024-02-11 NOTE — PROGRESS NOTES
"Subjective   Radha Cruz is a 78 y.o. female. Presents today for hypertension follow up  Chief Complaint   Patient presents with    Hypertension       History Of Present Illness    She reports she and a girlfriend were out until 4am this [place] the boat.  They were partying and having fun.  She had a couple of hours sleep, got a shower and came to her appointment.  Her blood pressure today is wonderful.  She just knew it would be elevated due to her being out all night.    Bilateral Lower extremity Edema:  She complains that \"Miss Piggy\" her right ankle and foot are swollen and she is having difficulty with it staying swollen.  Her left ankle has some edema but not as much as the right.      Cerebral atherosclerosis - Radha is prescribed Plavix to manage her atherosclerosis.    Type II DM:  Currently metformin is managing her symptoms along with diet and exercise.      Primary Hypertension - Radha takes Atorvastatin, Losartan and Metoprolol to manage her blood pressure.    Dyslipidemia  - Radha is taking atorvastatin to manage her dyslipidemia    Palpitations:  metoprolol is managing her palpitations well.    Gout:  Colchicine has managed her gout well.    Saul Nodes:  Manages pain from these with meloxicam    Seasonal allergies:  Cetirizine manages her allergies well.  She isn't taking the monteleukast at this time.    Thyroid Screening:  She reports feeling cold a lot.  Will screen thyroid.      Patient Active Problem List   Diagnosis    Atopic rhinitis    Acute otitis media    Bell's palsy    Benign essential hypertension    Cerebral atherosclerosis    Type 2 diabetes mellitus with microalbuminuria, without long-term current use of insulin    Familial hypertriglyceridemia    Low back pain    Palpitations    Saul nodes (DJD hand)       Social History     Socioeconomic History    Marital status:    Tobacco Use    Smoking status: Never    Smokeless tobacco: Never   Vaping Use    " "Vaping Use: Never used   Substance and Sexual Activity    Alcohol use: Never    Drug use: Never    Sexual activity: Defer       Allergies   Allergen Reactions    Codeine Nausea And Vomiting    Latex Rash    Lisinopril-Hydrochlorothiazide Other (See Comments)     Cough         Current Outpatient Medications on File Prior to Visit   Medication Sig Dispense Refill    clotrimazole (LOTRIMIN) 1 % cream APPLY  CREAM TOPICALLY TWICE DAILY TO AFFECTED AREA AS DIRECTED 60 g 0    hydrocortisone 2.5 % cream Apply 1 Application topically to the appropriate area as directed 2 (Two) Times a Day.      ketoconazole (NIZORAL) 2 % shampoo Apply 1 application  topically to the appropriate area as directed 1 (One) Time Per Week.      nystatin (MYCOSTATIN) 731964 UNIT/GM ointment       potassium chloride (KAYCIEL) 20 mEq/15 mL solution TAKE 15 ML BY MOUTH  ONCE DAILY 480 mL 0    [DISCONTINUED] metoprolol succinate XL (TOPROL-XL) 100 MG 24 hr tablet Take 1 tablet by mouth once daily 90 tablet 0    [DISCONTINUED] montelukast (SINGULAIR) 10 MG tablet TAKE 1 TABLET BY MOUTH ONCE DAILY AT NIGHT (Patient not taking: Reported on 2/9/2024) 90 tablet 0    [DISCONTINUED] promethazine-dextromethorphan (PROMETHAZINE-DM) 6.25-15 MG/5ML syrup TAKE 5 ML BY MOUTH  4 TIMES DAILY AS NEEDED FOR COUGH (Patient not taking: Reported on 12/29/2023) 118 mL 0     No current facility-administered medications on file prior to visit.       Objective   Vitals:    02/09/24 1100   BP: 126/74   Pulse: 60   SpO2: 97%   Weight: 70.6 kg (155 lb 9.6 oz)   Height: 165.1 cm (65\")     Body mass index is 25.89 kg/m².    Physical Exam  Constitutional:       Appearance: She is normal weight.   HENT:      Head: Normocephalic and atraumatic.      Mouth/Throat:      Mouth: Mucous membranes are moist.   Eyes:      Pupils: Pupils are equal, round, and reactive to light.   Cardiovascular:      Rate and Rhythm: Normal rate and regular rhythm.      Pulses: Normal pulses.      Heart " sounds: Normal heart sounds.   Pulmonary:      Effort: Pulmonary effort is normal.      Breath sounds: Normal breath sounds.   Abdominal:      General: Bowel sounds are normal.   Musculoskeletal:         General: Swelling present. Normal range of motion.      Right lower leg: Edema present.      Left lower leg: Edema present.      Comments: Right greater than left.   Skin:     General: Skin is warm and dry.      Capillary Refill: Capillary refill takes less than 2 seconds.   Neurological:      General: No focal deficit present.      Mental Status: She is alert.   Psychiatric:         Mood and Affect: Mood normal.       Procedures     Assessment & Plan   Diagnoses and all orders for this visit:    1. Bilateral edema of lower extremity (Primary)  -     furosemide (LASIX) 20 MG tablet; Take 0.5 tablets by mouth Daily.  Dispense: 90 tablet; Refill: 0  -     Compression Stockings    2. Cerebral atherosclerosis  -     Discontinue: atorvastatin (LIPITOR) 40 MG tablet; Take 1 tablet by mouth Every Night.  Dispense: 90 tablet; Refill: 1  -     atorvastatin (LIPITOR) 40 MG tablet; Take 1 tablet by mouth Every Night.  Dispense: 90 tablet; Refill: 1  -     clopidogrel (PLAVIX) 75 MG tablet; Take 1 tablet by mouth Daily.  Dispense: 90 tablet; Refill: 1    3. Type 2 diabetes mellitus with microalbuminuria, without long-term current use of insulin  -     Hemoglobin A1c  -     CBC & Differential  -     Comprehensive Metabolic Panel  -     metFORMIN (GLUCOPHAGE) 500 MG tablet; Take 1 tablet by mouth 3 (Three) Times a Day With Meals. NULL  Dispense: 270 tablet; Refill: 1    4. Primary Hypertension  -     felodipine (PLENDIL) 10 MG 24 hr tablet; Take 1 tablet by mouth Daily.  Dispense: 90 tablet; Refill: 0  -     Discontinue: losartan (COZAAR) 100 MG tablet; Take 1 tablet by mouth Daily.  Dispense: 90 tablet; Refill: 1  -     losartan (COZAAR) 100 MG tablet; Take 1 tablet by mouth Daily.  Dispense: 90 tablet; Refill: 1    5.  Dyslipidemia  -     Discontinue: atorvastatin (LIPITOR) 40 MG tablet; Take 1 tablet by mouth Every Night.  Dispense: 90 tablet; Refill: 1  -     atorvastatin (LIPITOR) 40 MG tablet; Take 1 tablet by mouth Every Night.  Dispense: 90 tablet; Refill: 1  -     Lipid Panel    6. Palpitations  -     metoprolol succinate XL (TOPROL-XL) 100 MG 24 hr tablet; Take 1 tablet by mouth Daily.  Dispense: 90 tablet; Refill: 0    7. Acute gout involving toe of right foot, initial encounter  -     colchicine 0.6 MG tablet; Take 1 tablet by mouth Daily.  Dispense: 30 tablet; Refill: 5    8. Saul nodes (DJD hand)  -     meloxicam (MOBIC) 7.5 MG tablet; Take 1 tablet by mouth Daily.  Dispense: 90 tablet; Refill: 0    9. Seasonal allergic rhinitis, unspecified trigger  -     Cetirizine HCl 10 MG capsule; Take 10 mg by mouth Daily.  Dispense: 90 capsule; Refill: 0    10. Thyroid screening  -     Thyroid Panel With TSH    Other orders  -     Discontinue: furosemide (LASIX) 20 MG tablet; Take 0.5 tablets by mouth Daily.  Dispense: 90 tablet; Refill: 0  -     magnesium oxide (MAG-OX) 400 MG tablet; Take 1 tablet by mouth Daily.  Dispense: 90 tablet; Refill: 1       Discussed Care Gaps, ordered referrals and encouraged vaccination updates.       - Pt agrees with plan of care and denies further questions/concerns today  - This document is intended for medical expert use only. Persons  reading this document without medical staff guidance may result in misinterpretation and unintended morbidity     Go to the ER for any possible life-threatening symptoms such as chest pain or shortness of air.      Please allow 3-5 business days for recommendations based on new results      I personally spent time with this patient, preparing for the visit, reviewing tests, obtaining and/or reviewing a separately obtained history, performing a medically appropriate examination and/or evaluation, counseling and educating the patient/family/caregiver, ordering  medications,  documenting information in the medical record and indepentently interpreting results.             Return in about 6 months (around 8/9/2024) for Annual physical, Medicare Wellness subsequent.

## 2024-03-07 DIAGNOSIS — E87.6 HYPOKALEMIA: ICD-10-CM

## 2024-03-07 RX ORDER — POTASSIUM CHLORIDE 20MEQ/15ML
LIQUID (ML) ORAL
Qty: 480 ML | Refills: 0 | Status: SHIPPED | OUTPATIENT
Start: 2024-03-07

## 2024-04-22 NOTE — PATIENT INSTRUCTIONS
Medicare Wellness  Personal Prevention Plan of Service     Date of Office Visit:    Encounter Provider:  ALONDRA Alfaro  Place of Service:  [facility]  Patient Name: Radha Cruz  :  1945    As part of the Medicare Wellness portion of your visit today, we are providing you with this personalized preventive plan of services (PPPS). This plan is based upon recommendations of the [place] Preventive Services Task Force (USPSTF) and the Advisory Committee on Immunization Practices (ACIP).    This lists the preventive care services that should be considered, and provides dates of when you are due. Items listed as completed are up-to-date and do not require any further intervention.    Health Maintenance   Topic Date Due    DXA SCAN  Never done    COVID-19 Vaccine (1) Never done    Pneumococcal Vaccine 65+ (1 - PCV) Never done    TDAP/TD VACCINES (1 - Tdap) Never done    INFLUENZA VACCINE  10/01/2022    HEMOGLOBIN A1C  10/11/2022    MAMMOGRAM  2023    LIPID PANEL  2023    URINE MICROALBUMIN  2023    ANNUAL WELLNESS VISIT  07/15/2023    DIABETIC FOOT EXAM  07/15/2023    HEPATITIS C SCREENING  Completed    DIABETIC EYE EXAM  Discontinued    COLORECTAL CANCER SCREENING  Discontinued       Orders Placed This Encounter   Procedures    Basic Metabolic Panel     Order Specific Question:   Release to patient     Answer:   Immediate    Ambulatory Referral to Orthopedic Surgery     Referral Priority:   Routine     Referral Type:   Consultation     Referral Reason:   Specialty Services Required     Referred to Provider:   Michael Dia MD     Requested Specialty:   Orthopedic Surgery     Number of Visits Requested:   1       No follow-ups on file.           [FreeTextEntry1] : -PT/OT scripts written in clinic  -Recommend continuing using cane  -Continue to follow up with Primary Care Doctor  -Educated patient on asking for therapy scripts from Primary Care Doctor, would be more convenient for family given place of residence is in [facility] -Follow up as needed

## 2024-04-30 DIAGNOSIS — M15.2 BOUCHARD NODES (DJD HAND): ICD-10-CM

## 2024-04-30 DIAGNOSIS — E87.6 HYPOKALEMIA: ICD-10-CM

## 2024-04-30 RX ORDER — POTASSIUM CHLORIDE 20MEQ/15ML
LIQUID (ML) ORAL
Qty: 480 ML | Refills: 0 | Status: SHIPPED | OUTPATIENT
Start: 2024-04-30

## 2024-04-30 RX ORDER — MELOXICAM 7.5 MG/1
7.5 TABLET ORAL DAILY
Qty: 90 TABLET | Refills: 0 | Status: SHIPPED | OUTPATIENT
Start: 2024-04-30

## 2024-05-24 ENCOUNTER — OFFICE VISIT (OUTPATIENT)
Dept: FAMILY MEDICINE CLINIC | Facility: CLINIC | Age: 79
End: 2024-05-24
Payer: MEDICARE

## 2024-05-24 VITALS
OXYGEN SATURATION: 98 % | WEIGHT: 150.4 LBS | HEIGHT: 65 IN | SYSTOLIC BLOOD PRESSURE: 146 MMHG | DIASTOLIC BLOOD PRESSURE: 68 MMHG | TEMPERATURE: 98.2 F | BODY MASS INDEX: 25.06 KG/M2 | HEART RATE: 71 BPM

## 2024-05-24 DIAGNOSIS — H65.191 ACUTE MUCOID OTITIS MEDIA OF RIGHT EAR: Primary | ICD-10-CM

## 2024-05-24 DIAGNOSIS — J02.9 SORE THROAT: ICD-10-CM

## 2024-05-24 LAB
EXPIRATION DATE: NORMAL
INTERNAL CONTROL: NORMAL
Lab: NORMAL
S PYO AG THROAT QL: NEGATIVE

## 2024-05-24 PROCEDURE — 99213 OFFICE O/P EST LOW 20 MIN: CPT | Performed by: NURSE PRACTITIONER

## 2024-05-24 PROCEDURE — 1126F AMNT PAIN NOTED NONE PRSNT: CPT | Performed by: NURSE PRACTITIONER

## 2024-05-24 PROCEDURE — 1159F MED LIST DOCD IN RCRD: CPT | Performed by: NURSE PRACTITIONER

## 2024-05-24 PROCEDURE — 87880 STREP A ASSAY W/OPTIC: CPT | Performed by: NURSE PRACTITIONER

## 2024-05-24 PROCEDURE — 3077F SYST BP >= 140 MM HG: CPT | Performed by: NURSE PRACTITIONER

## 2024-05-24 PROCEDURE — 3078F DIAST BP <80 MM HG: CPT | Performed by: NURSE PRACTITIONER

## 2024-05-24 PROCEDURE — 1160F RVW MEDS BY RX/DR IN RCRD: CPT | Performed by: NURSE PRACTITIONER

## 2024-05-24 RX ORDER — AMOXICILLIN AND CLAVULANATE POTASSIUM 875; 125 MG/1; MG/1
1 TABLET, FILM COATED ORAL 2 TIMES DAILY
Qty: 20 TABLET | Refills: 0 | Status: SHIPPED | OUTPATIENT
Start: 2024-05-24

## 2024-05-24 NOTE — PROGRESS NOTES
Subjective   Radha Cruz is a 78 y.o. female. Presents today for   Chief Complaint   Patient presents with    Sore Throat     X's 1 week   Start with  runny nose and congestion  Roof of mouth is irritated and now ear hurts       History Of Present Illness  Radha has had a sore throat for about a week.  It began with sinus drainage and has worsened.      Patient Active Problem List   Diagnosis    Atopic rhinitis    Acute otitis media    Bell's palsy    Benign essential hypertension    Cerebral atherosclerosis    Type 2 diabetes mellitus with microalbuminuria, without long-term current use of insulin    Familial hypertriglyceridemia    Low back pain    Palpitations    Saul nodes (DJD hand)       Social History     Socioeconomic History    Marital status:    Tobacco Use    Smoking status: Never    Smokeless tobacco: Never   Vaping Use    Vaping status: Never Used   Substance and Sexual Activity    Alcohol use: Never    Drug use: Never    Sexual activity: Defer       Allergies   Allergen Reactions    Codeine Nausea And Vomiting    Latex Rash    Lisinopril-Hydrochlorothiazide Other (See Comments)     Cough         Current Outpatient Medications on File Prior to Visit   Medication Sig Dispense Refill    atorvastatin (LIPITOR) 40 MG tablet Take 1 tablet by mouth Every Night. 90 tablet 1    Cetirizine HCl 10 MG capsule Take 10 mg by mouth Daily. 90 capsule 0    clopidogrel (PLAVIX) 75 MG tablet Take 1 tablet by mouth Daily. 90 tablet 1    clotrimazole (LOTRIMIN) 1 % cream APPLY  CREAM TOPICALLY TWICE DAILY TO AFFECTED AREA AS DIRECTED 60 g 0    colchicine 0.6 MG tablet Take 1 tablet by mouth Daily. 30 tablet 5    felodipine (PLENDIL) 10 MG 24 hr tablet Take 1 tablet by mouth Daily. 90 tablet 0    furosemide (LASIX) 20 MG tablet Take 0.5 tablets by mouth Daily. 90 tablet 0    hydrocortisone 2.5 % cream Apply 1 Application topically to the appropriate area as directed 2 (Two) Times a Day.       "ketoconazole (NIZORAL) 2 % shampoo Apply 1 application  topically to the appropriate area as directed 1 (One) Time Per Week.      losartan (COZAAR) 100 MG tablet Take 1 tablet by mouth Daily. 90 tablet 1    magnesium oxide (MAG-OX) 400 MG tablet Take 1 tablet by mouth Daily. 90 tablet 1    meloxicam (MOBIC) 7.5 MG tablet Take 1 tablet by mouth once daily 90 tablet 0    metFORMIN (GLUCOPHAGE) 500 MG tablet Take 1 tablet by mouth 3 (Three) Times a Day With Meals. NULL 270 tablet 1    metoprolol succinate XL (TOPROL-XL) 100 MG 24 hr tablet Take 1 tablet by mouth Daily. 90 tablet 0    nystatin (MYCOSTATIN) 991147 UNIT/GM ointment       potassium chloride (KAYCIEL) 20 mEq/15 mL solution TAKE 15 ML BY MOUTH  ONCE DAILY 480 mL 0     No current facility-administered medications on file prior to visit.       Objective   Vitals:    05/24/24 0936   BP: 146/68   Pulse: 71   Temp: 98.2 °F (36.8 °C)   SpO2: 98%   Weight: 68.2 kg (150 lb 6.4 oz)   Height: 165.1 cm (65\")     Body mass index is 25.03 kg/m².    Physical Exam  Constitutional:       Appearance: She is obese.   HENT:      Head: Normocephalic.      Mouth/Throat:      Mouth: Mucous membranes are moist.   Cardiovascular:      Rate and Rhythm: Normal rate and regular rhythm.      Pulses: Normal pulses.      Heart sounds: Normal heart sounds.   Pulmonary:      Effort: Pulmonary effort is normal.      Breath sounds: Normal breath sounds.   Abdominal:      General: Bowel sounds are normal.   Musculoskeletal:         General: Normal range of motion.   Skin:     General: Skin is warm and dry.      Capillary Refill: Capillary refill takes less than 2 seconds.   Neurological:      General: No focal deficit present.      Mental Status: She is alert.   Psychiatric:         Mood and Affect: Mood normal.     Procedures     Assessment & Plan   Diagnoses and all orders for this visit:    1. Acute mucoid otitis media of right ear (Primary)  -     amoxicillin-clavulanate (AUGMENTIN) " 875-125 MG per tablet; Take 1 tablet by mouth 2 (Two) Times a Day.  Dispense: 20 tablet; Refill: 0            .Body mass index is 25.03 kg/m².       Discussed Care Gaps, ordered referrals and encouraged vaccination updates.       - Pt agrees with plan of care and denies further questions/concerns today  - This document is intended for medical expert use only. Persons  reading this document without medical staff guidance may result in misinterpretation and unintended morbidity     Go to the ER for any possible life-threatening symptoms such as chest pain or shortness of air.      Please allow 3-5 business days for recommendations based on new results      I personally spent time with this patient, preparing for the visit, reviewing tests, obtaining and/or reviewing a separately obtained history, performing a medically appropriate examination and/or evaluation, counseling and educating the patient/family/caregiver, ordering medications,  documenting information in the medical record and indepentently interpreting results.          No follow-ups on file.

## 2024-06-08 DIAGNOSIS — E87.6 HYPOKALEMIA: ICD-10-CM

## 2024-06-10 RX ORDER — POTASSIUM CHLORIDE 20MEQ/15ML
LIQUID (ML) ORAL
Qty: 480 ML | Refills: 0 | Status: SHIPPED | OUTPATIENT
Start: 2024-06-10

## 2024-06-24 DIAGNOSIS — R60.0 BILATERAL EDEMA OF LOWER EXTREMITY: ICD-10-CM

## 2024-06-24 RX ORDER — FUROSEMIDE 20 MG/1
20 TABLET ORAL DAILY
Qty: 90 TABLET | Refills: 0 | Status: SHIPPED | OUTPATIENT
Start: 2024-06-24

## 2024-07-16 DIAGNOSIS — I10 BENIGN ESSENTIAL HYPERTENSION: ICD-10-CM

## 2024-07-16 RX ORDER — FELODIPINE 10 MG/1
10 TABLET, EXTENDED RELEASE ORAL DAILY
Qty: 90 TABLET | Refills: 0 | Status: SHIPPED | OUTPATIENT
Start: 2024-07-16

## 2024-07-29 DIAGNOSIS — E87.6 HYPOKALEMIA: ICD-10-CM

## 2024-07-29 RX ORDER — POTASSIUM CHLORIDE 20MEQ/15ML
LIQUID (ML) ORAL
Qty: 480 ML | Refills: 0 | Status: SHIPPED | OUTPATIENT
Start: 2024-07-29

## 2024-08-01 DIAGNOSIS — M15.2 BOUCHARD NODES (DJD HAND): ICD-10-CM

## 2024-08-02 RX ORDER — MELOXICAM 7.5 MG/1
7.5 TABLET ORAL DAILY
Qty: 90 TABLET | Refills: 0 | Status: SHIPPED | OUTPATIENT
Start: 2024-08-02

## 2024-08-09 ENCOUNTER — OFFICE VISIT (OUTPATIENT)
Dept: FAMILY MEDICINE CLINIC | Facility: CLINIC | Age: 79
End: 2024-08-09
Payer: MEDICARE

## 2024-08-09 VITALS
WEIGHT: 151.8 LBS | SYSTOLIC BLOOD PRESSURE: 132 MMHG | HEART RATE: 69 BPM | HEIGHT: 65 IN | BODY MASS INDEX: 25.29 KG/M2 | DIASTOLIC BLOOD PRESSURE: 78 MMHG | OXYGEN SATURATION: 99 %

## 2024-08-09 DIAGNOSIS — Z00.00 ENCOUNTER FOR ANNUAL WELLNESS VISIT (AWV) IN MEDICARE PATIENT: Primary | ICD-10-CM

## 2024-08-09 NOTE — PROGRESS NOTES
Subjective   The ABCs of the Annual Wellness Visit  Medicare Wellness Visit      Radha rCuz is a 78 y.o. patient who presents for a Medicare Wellness Visit.  She is a spunky 78 year old who still goes out with girlfriends to clubs and the boat and stays out all night, comes home and sleeps 2 hours, and gets up and goes about her day.  She is without complaint today.    The following portions of the patient's history were reviewed and   updated as appropriate: allergies, current medications, past family history, past medical history, past social history, past surgical history, and problem list.    Compared to one year ago, the patient's physical   health is the same.  Compared to one year ago, the patient's mental   health is the same.    Recent Hospitalizations:  She was not admitted to the hospital during the last year.     Current Medical Providers:  Patient Care Team:  Molly Douglass APRN as PCP - General (Family Medicine)  Beatriz Bernal APRN as PCP - Family Medicine    Outpatient Medications Prior to Visit   Medication Sig Dispense Refill    amoxicillin-clavulanate (AUGMENTIN) 875-125 MG per tablet Take 1 tablet by mouth 2 (Two) Times a Day. 20 tablet 0    atorvastatin (LIPITOR) 40 MG tablet Take 1 tablet by mouth Every Night. 90 tablet 1    Cetirizine HCl 10 MG capsule Take 10 mg by mouth Daily. 90 capsule 0    clopidogrel (PLAVIX) 75 MG tablet Take 1 tablet by mouth Daily. 90 tablet 1    clotrimazole (LOTRIMIN) 1 % cream APPLY  CREAM TOPICALLY TWICE DAILY TO AFFECTED AREA AS DIRECTED 60 g 0    colchicine 0.6 MG tablet Take 1 tablet by mouth Daily. 30 tablet 5    felodipine (PLENDIL) 10 MG 24 hr tablet Take 1 tablet by mouth once daily 90 tablet 0    furosemide (LASIX) 20 MG tablet Take 1 tablet by mouth once daily 90 tablet 0    hydrocortisone 2.5 % cream Apply 1 Application topically to the appropriate area as directed 2 (Two) Times a Day.      ketoconazole (NIZORAL) 2 % shampoo Apply 1  "application  topically to the appropriate area as directed 1 (One) Time Per Week.      losartan (COZAAR) 100 MG tablet Take 1 tablet by mouth Daily. 90 tablet 1    magnesium oxide (MAG-OX) 400 MG tablet Take 1 tablet by mouth Daily. 90 tablet 1    meloxicam (MOBIC) 7.5 MG tablet Take 1 tablet by mouth once daily 90 tablet 0    metFORMIN (GLUCOPHAGE) 500 MG tablet Take 1 tablet by mouth 3 (Three) Times a Day With Meals. NULL 270 tablet 1    metoprolol succinate XL (TOPROL-XL) 100 MG 24 hr tablet Take 1 tablet by mouth Daily. 90 tablet 0    nystatin (MYCOSTATIN) 164631 UNIT/GM ointment       potassium chloride (KAYCIEL) 20 mEq/15 mL solution TAKE 15 ML BY MOUTH  ONCE DAILY 480 mL 0     No facility-administered medications prior to visit.     No opioid medication identified on active medication list. I have reviewed chart for other potential  high risk medication/s and harmful drug interactions in the elderly.      Aspirin is not on active medication list.  Aspirin use is not indicated based on review of current medical condition/s. Risk of harm outweighs potential benefits.  .    Patient Active Problem List   Diagnosis    Atopic rhinitis    Acute otitis media    Bell's palsy    Benign essential hypertension    Cerebral atherosclerosis    Type 2 diabetes mellitus with microalbuminuria, without long-term current use of insulin    Familial hypertriglyceridemia    Low back pain    Palpitations    Saul nodes (DJD hand)     Advance Care Planning Advance Directive is not on file.  ACP discussion was held with the patient during this visit. Patient has an advance directive (not in EMR), copy requested.            Objective   There were no vitals filed for this visit.    Estimated body mass index is 25.03 kg/m² as calculated from the following:    Height as of 5/24/24: 165.1 cm (65\").    Weight as of 5/24/24: 68.2 kg (150 lb 6.4 oz).    BMI is >= 25 and <30. (Overweight) The following options were offered after " discussion;: weight loss educational material (shared in after visit summary), exercise counseling/recommendations, nutrition counseling/recommendations, and pharmacological intervention options       Does the patient have evidence of cognitive impairment? No                                                                                                Health  Risk Assessment    Smoking Status:  Social History     Tobacco Use   Smoking Status Never   Smokeless Tobacco Never     Alcohol Consumption:  Social History     Substance and Sexual Activity   Alcohol Use Never       Fall Risk Screen  JAROCHOADI Fall Risk Assessment has not been completed.    Depression Screenin/4/2023     9:12 AM   PHQ-2/PHQ-9 Depression Screening   Little Interest or Pleasure in Doing Things 0-->not at all   Feeling Down, Depressed or Hopeless 0-->not at all   PHQ-9: Brief Depression Severity Measure Score 0     Health Habits and Functional and Cognitive Screenin/4/2023     9:00 AM   Functional & Cognitive Status   Do you have difficulty preparing food and eating? No   Do you have difficulty bathing yourself, getting dressed or grooming yourself? No   Do you have difficulty using the toilet? No   Do you have difficulty moving around from place to place? No   Do you have trouble with steps or getting out of a bed or a chair? No   Current Diet Well Balanced Diet   Dental Exam Not up to date   Eye Exam Not up to date   Exercise (times per week) 0 times per week   Current Exercises Include No Regular Exercise   Do you need help using the phone?  No   Are you deaf or do you have serious difficulty hearing?  No   Do you need help to go to places out of walking distance? No   Do you need help shopping? No   Do you need help preparing meals?  No   Do you need help with housework?  No   Do you need help with laundry? No   Do you need help taking your medications? No   Do you need help managing money? No   Do you ever drive or ride in a  car without wearing a seat belt? No   Have you felt unusual stress, anger or loneliness in the last month? No   Who do you live with? Spouse   If you need help, do you have trouble finding someone available to you? No   Have you been bothered in the last four weeks by sexual problems? No   Do you have difficulty concentrating, remembering or making decisions? No           Age-appropriate Screening Schedule:  Refer to the list below for future screening recommendations based on patient's age, sex and/or medical conditions. Orders for these recommended tests are listed in the plan section. The patient has been provided with a written plan.    Health Maintenance List  Health Maintenance   Topic Date Due    Pneumococcal Vaccine 65+ (1 of 2 - PCV) Never done    TDAP/TD VACCINES (1 - Tdap) Never done    RSV Vaccine - Adults (1 - 1-dose 60+ series) Never done    ANNUAL WELLNESS VISIT  07/15/2023    COVID-19 Vaccine (1 - 2023-24 season) Never done    URINE MICROALBUMIN  03/06/2024    BMI FOLLOWUP  08/04/2024    HEMOGLOBIN A1C  08/09/2024    INFLUENZA VACCINE  08/01/2024    DXA SCAN  09/09/2024    LIPID PANEL  02/09/2025    HEPATITIS C SCREENING  Completed    DIABETIC EYE EXAM  Discontinued    COLORECTAL CANCER SCREENING  Discontinued                                                                                                                                                CMS Preventative Services Quick Reference  Risk Factors Identified During Encounter  None Identified    The above risks/problems have been discussed with the patient.  Pertinent information has been shared with the patient in the After Visit Summary.  An After Visit Summary and PPPS were made available to the patient.    BMI is >= 25 and <30. (Overweight) The following options were offered after discussion;: weight loss educational material (shared in after visit summary), exercise counseling/recommendations, nutrition counseling/recommendations, and  pharmacological intervention options    Discussed Care Gaps, ordered referrals and encouraged vaccination updates.       - Pt agrees with plan of care and denies further questions/concerns today  - This document is intended for medical expert use only. Persons  reading this document without medical staff guidance may result in misinterpretation and unintended morbidity     Go to the ER for any possible life-threatening symptoms such as chest pain or shortness of air.      Please allow 3-5 business days for recommendations based on new results      I personally spent time with this patient, preparing for the visit, reviewing tests, obtaining and/or reviewing a separately obtained history, performing a medically appropriate examination and/or evaluation, counseling and educating the patient/family/caregiver, ordering medications,  documenting information in the medical record and indepentently interpreting results.           Follow Up:  Next Medicare Wellness visit to be scheduled in 1 year.

## 2024-08-13 DIAGNOSIS — R00.2 PALPITATIONS: ICD-10-CM

## 2024-08-13 RX ORDER — METOPROLOL SUCCINATE 100 MG/1
100 TABLET, EXTENDED RELEASE ORAL DAILY
Qty: 90 TABLET | Refills: 1 | Status: SHIPPED | OUTPATIENT
Start: 2024-08-13

## 2024-08-30 DIAGNOSIS — E87.6 HYPOKALEMIA: ICD-10-CM

## 2024-09-03 RX ORDER — POTASSIUM CHLORIDE 20MEQ/15ML
LIQUID (ML) ORAL
Qty: 480 ML | Refills: 0 | Status: SHIPPED | OUTPATIENT
Start: 2024-09-03

## 2024-09-24 DIAGNOSIS — R60.0 BILATERAL EDEMA OF LOWER EXTREMITY: ICD-10-CM

## 2024-09-24 RX ORDER — FUROSEMIDE 20 MG
20 TABLET ORAL DAILY
Qty: 90 TABLET | Refills: 1 | Status: SHIPPED | OUTPATIENT
Start: 2024-09-24

## 2024-10-16 DIAGNOSIS — I10 BENIGN ESSENTIAL HYPERTENSION: ICD-10-CM

## 2024-10-16 RX ORDER — FELODIPINE 10 MG/1
10 TABLET, EXTENDED RELEASE ORAL DAILY
Qty: 90 TABLET | Refills: 0 | Status: SHIPPED | OUTPATIENT
Start: 2024-10-16

## 2024-10-30 DIAGNOSIS — M15.2 BOUCHARD NODES (DJD HAND): ICD-10-CM

## 2024-10-30 DIAGNOSIS — E87.6 HYPOKALEMIA: ICD-10-CM

## 2024-10-30 RX ORDER — MELOXICAM 7.5 MG/1
7.5 TABLET ORAL DAILY
Qty: 90 TABLET | Refills: 0 | Status: SHIPPED | OUTPATIENT
Start: 2024-10-30

## 2024-10-30 RX ORDER — POTASSIUM CHLORIDE 20MEQ/15ML
LIQUID (ML) ORAL
Qty: 480 ML | Refills: 0 | Status: SHIPPED | OUTPATIENT
Start: 2024-10-30

## 2024-11-12 DIAGNOSIS — E11.29 TYPE 2 DIABETES MELLITUS WITH MICROALBUMINURIA, WITHOUT LONG-TERM CURRENT USE OF INSULIN: ICD-10-CM

## 2024-11-12 DIAGNOSIS — R80.9 TYPE 2 DIABETES MELLITUS WITH MICROALBUMINURIA, WITHOUT LONG-TERM CURRENT USE OF INSULIN: ICD-10-CM

## 2024-11-27 ENCOUNTER — OFFICE VISIT (OUTPATIENT)
Dept: FAMILY MEDICINE CLINIC | Facility: CLINIC | Age: 79
End: 2024-11-27
Payer: MEDICARE

## 2024-11-27 VITALS
HEIGHT: 65 IN | SYSTOLIC BLOOD PRESSURE: 118 MMHG | WEIGHT: 150.4 LBS | OXYGEN SATURATION: 98 % | DIASTOLIC BLOOD PRESSURE: 62 MMHG | HEART RATE: 49 BPM | BODY MASS INDEX: 25.06 KG/M2

## 2024-11-27 DIAGNOSIS — B96.89 ACUTE BACTERIAL SINUSITIS: Primary | ICD-10-CM

## 2024-11-27 DIAGNOSIS — J01.90 ACUTE BACTERIAL SINUSITIS: Primary | ICD-10-CM

## 2024-11-27 PROCEDURE — 1126F AMNT PAIN NOTED NONE PRSNT: CPT | Performed by: NURSE PRACTITIONER

## 2024-11-27 PROCEDURE — 3074F SYST BP LT 130 MM HG: CPT | Performed by: NURSE PRACTITIONER

## 2024-11-27 PROCEDURE — 99213 OFFICE O/P EST LOW 20 MIN: CPT | Performed by: NURSE PRACTITIONER

## 2024-11-27 PROCEDURE — 3078F DIAST BP <80 MM HG: CPT | Performed by: NURSE PRACTITIONER

## 2024-11-27 NOTE — PROGRESS NOTES
Subjective   Radha Cruz is a 79 y.o. female.     Chief Complaint   Patient presents with    Hypertension     182/91 this am     URI   Radha Calhoun presents today with complaint of high blood pressure 180/100 this am at 0400.  She drank herbal tea made from lemon, lime, cinammon, clove, nutmeg and a plant as hot as she could stand it and her blood pressure has normalized.    History of Present Illness  The patient presents for evaluation of a sinus infection.    She has been dealing with a sinus infection for over a week. She mentions that her eyes have been watering, which she does ibelieve is due to allergies. She has been using eye drops to manage this symptom.    She reports experiencing palpitations and a high blood pressure reading of 118/62. She also mentions feeling unwell upon waking this morning, attributing it to a lack of sleep due to the palpitations. She notes that her palpitations have been intermittent, but were particularly severe last night, causing her distress. She believes the sinus infection is causing her heart rate to fluctuate between 49 and 64.    She has been managing her symptoms with home remedies, including a concoction of lemon, orange, cinnamon, cloves, and a plant called gelcoshop, which she believes is beneficial for blood pressure and diabetes. She also consumes elena tea.       The following portions of the patient's history were reviewed and updated as appropriate: allergies, current medications, past family history, past medical history, past social history, past surgical history and problem list.    Results         Objective     Vitals:    11/27/24 1324   BP: 118/62   Pulse: (!) 49   SpO2: 98%      Body mass index is 25.03 kg/m².    Current Outpatient Medications   Medication Instructions    amoxicillin-clavulanate (AUGMENTIN) 875-125 MG per tablet 1 tablet, Oral, 2 Times Daily    atorvastatin (LIPITOR) 40 mg, Oral, Nightly    Cetirizine HCl 10 mg,  Oral, Daily    clopidogrel (PLAVIX) 75 mg, Oral, Daily    clotrimazole (LOTRIMIN) 1 % cream APPLY  CREAM TOPICALLY TWICE DAILY TO AFFECTED AREA AS DIRECTED    colchicine 0.6 mg, Oral, Daily    felodipine (PLENDIL) 10 mg, Oral, Daily    furosemide (LASIX) 20 mg, Oral, Daily    hydrocortisone 2.5 % cream Apply 1 Application topically to the appropriate area as directed 2 (Two) Times a Day.    ketoconazole (NIZORAL) 2 % shampoo Apply 1 application  topically to the appropriate area as directed 1 (One) Time Per Week.    losartan (COZAAR) 100 mg, Oral, Daily    magnesium oxide (MAG-OX) 400 mg, Oral, Daily    meloxicam (MOBIC) 7.5 mg, Oral, Daily    metFORMIN (GLUCOPHAGE) 500 mg, Oral, 3 Times Daily With Meals    metoprolol succinate XL (TOPROL-XL) 100 mg, Oral, Daily    nystatin (MYCOSTATIN) 166858 UNIT/GM ointment     potassium chloride (KAYCIEL) 20 mEq/15 mL solution TAKE 15 ML BY MOUTH  ONCE DAILY            Physical Exam  Constitutional:       Appearance: Normal appearance.   HENT:      Head: Normocephalic and atraumatic.      Nose: Nose normal.      Mouth/Throat:      Mouth: Mucous membranes are moist.   Eyes:      Pupils: Pupils are equal, round, and reactive to light.   Cardiovascular:      Rate and Rhythm: Normal rate and regular rhythm.      Pulses: Normal pulses.      Heart sounds: Normal heart sounds.   Pulmonary:      Effort: Pulmonary effort is normal.      Breath sounds: Normal breath sounds.   Abdominal:      General: Bowel sounds are normal.   Musculoskeletal:         General: Normal range of motion.      Cervical back: Normal range of motion.   Skin:     General: Skin is warm and dry.      Capillary Refill: Capillary refill takes less than 2 seconds.   Neurological:      General: No focal deficit present.      Mental Status: She is alert.   Psychiatric:         Mood and Affect: Mood normal.       Assessment & Plan  1. Sinus infection.  She reports experiencing palpitations and difficulty sleeping due to  a sinus infection that has been ongoing for a little over a week. Amoxicillin was prescribed to treat the sinus infection.     Diagnoses and all orders for this visit:    1. Acute bacterial sinusitis (Primary)  -     amoxicillin-clavulanate (AUGMENTIN) 875-125 MG per tablet; Take 1 tablet by mouth 2 (Two) Times a Day.  Dispense: 20 tablet; Refill: 0          Discussed Care Gaps, ordered referrals and encouraged vaccination updates.       - Pt agrees with plan of care and denies further questions/concerns today  - This document is intended for medical expert use only. Persons  reading this document without medical staff guidance may result in misinterpretation and unintended morbidity     Go to the ER for any possible life-threatening symptoms such as chest pain or shortness of air.      Please allow 3-5 business days for recommendations based on new results      I personally spent time with this patient, preparing for the visit, reviewing tests, obtaining and/or reviewing a separately obtained history, performing a medically appropriate examination and/or evaluation, counseling and educating the patient/family/caregiver, ordering medications,  documenting information in the medical record and indepentently interpreting results.       Patient or patient representative verbalized consent for the use of Ambient Listening during the visit with  ALONDRA Garsia for chart documentation. 11/27/2024  15:00 EST    MDM:  Symptoms present with worsening over the past couple of days.

## 2024-12-04 DIAGNOSIS — I67.2 CEREBRAL ATHEROSCLEROSIS: ICD-10-CM

## 2024-12-04 RX ORDER — CLOPIDOGREL BISULFATE 75 MG/1
75 TABLET ORAL DAILY
Qty: 90 TABLET | Refills: 0 | Status: SHIPPED | OUTPATIENT
Start: 2024-12-04

## 2024-12-06 ENCOUNTER — TELEPHONE (OUTPATIENT)
Dept: FAMILY MEDICINE CLINIC | Facility: CLINIC | Age: 79
End: 2024-12-06

## 2024-12-06 NOTE — TELEPHONE ENCOUNTER
Caller: Radha Cruz    Relationship: Self    Best call back number: [phone]      What is the medical concern/diagnosis: DIABETIC EYE EXAM    What specialty or service is being requested: EYE DR    What is the provider, practice or medical service name: [facility]      What is the office phone number: [phone], FAX #[phone]    Any additional details: SHE HAS AN APPOINTMENT ON 12/20/24

## 2024-12-16 DIAGNOSIS — E87.6 HYPOKALEMIA: ICD-10-CM

## 2024-12-16 RX ORDER — POTASSIUM CHLORIDE 20MEQ/15ML
LIQUID (ML) ORAL
Qty: 480 ML | Refills: 0 | Status: SHIPPED | OUTPATIENT
Start: 2024-12-16

## 2024-12-22 DIAGNOSIS — M10.171 ACUTE LEAD-INDUCED GOUT INVOLVING TOE OF RIGHT FOOT, INITIAL ENCOUNTER: ICD-10-CM

## 2024-12-22 DIAGNOSIS — T56.0X1A ACUTE LEAD-INDUCED GOUT INVOLVING TOE OF RIGHT FOOT, INITIAL ENCOUNTER: ICD-10-CM

## 2024-12-23 RX ORDER — COLCHICINE 0.6 MG/1
0.6 TABLET ORAL DAILY
Qty: 30 TABLET | Refills: 1 | Status: SHIPPED | OUTPATIENT
Start: 2024-12-23

## 2025-01-12 DIAGNOSIS — R00.2 PALPITATIONS: ICD-10-CM

## 2025-01-12 DIAGNOSIS — I10 BENIGN ESSENTIAL HYPERTENSION: ICD-10-CM

## 2025-01-13 RX ORDER — METOPROLOL SUCCINATE 100 MG/1
100 TABLET, EXTENDED RELEASE ORAL DAILY
Qty: 90 TABLET | Refills: 0 | Status: SHIPPED | OUTPATIENT
Start: 2025-01-13

## 2025-01-13 RX ORDER — FELODIPINE 10 MG/1
10 TABLET, EXTENDED RELEASE ORAL DAILY
Qty: 90 TABLET | Refills: 0 | Status: SHIPPED | OUTPATIENT
Start: 2025-01-13

## 2025-01-17 DIAGNOSIS — E87.6 HYPOKALEMIA: ICD-10-CM

## 2025-01-17 RX ORDER — POTASSIUM CHLORIDE 20MEQ/15ML
LIQUID (ML) ORAL
Qty: 480 ML | Refills: 0 | Status: SHIPPED | OUTPATIENT
Start: 2025-01-17

## 2025-01-31 DIAGNOSIS — M15.2 BOUCHARD NODES (DJD HAND): ICD-10-CM

## 2025-02-02 RX ORDER — MELOXICAM 7.5 MG/1
7.5 TABLET ORAL DAILY
Qty: 90 TABLET | Refills: 0 | Status: SHIPPED | OUTPATIENT
Start: 2025-02-02

## 2025-02-04 DIAGNOSIS — I67.2 CEREBRAL ATHEROSCLEROSIS: ICD-10-CM

## 2025-02-04 RX ORDER — CLOPIDOGREL BISULFATE 75 MG/1
75 TABLET ORAL DAILY
Qty: 90 TABLET | Refills: 0 | Status: SHIPPED | OUTPATIENT
Start: 2025-02-04

## 2025-02-05 DIAGNOSIS — R80.9 TYPE 2 DIABETES MELLITUS WITH MICROALBUMINURIA, WITHOUT LONG-TERM CURRENT USE OF INSULIN: ICD-10-CM

## 2025-02-05 DIAGNOSIS — R00.2 PALPITATIONS: ICD-10-CM

## 2025-02-05 DIAGNOSIS — E11.29 TYPE 2 DIABETES MELLITUS WITH MICROALBUMINURIA, WITHOUT LONG-TERM CURRENT USE OF INSULIN: ICD-10-CM

## 2025-02-05 RX ORDER — METOPROLOL SUCCINATE 100 MG/1
100 TABLET, EXTENDED RELEASE ORAL DAILY
Qty: 90 TABLET | Refills: 0 | Status: SHIPPED | OUTPATIENT
Start: 2025-02-05

## 2025-02-12 DIAGNOSIS — R60.0 BILATERAL EDEMA OF LOWER EXTREMITY: ICD-10-CM

## 2025-02-12 RX ORDER — FUROSEMIDE 20 MG/1
TABLET ORAL
Qty: 45 TABLET | Refills: 0 | Status: SHIPPED | OUTPATIENT
Start: 2025-02-12

## 2025-02-14 ENCOUNTER — OFFICE VISIT (OUTPATIENT)
Dept: FAMILY MEDICINE CLINIC | Facility: CLINIC | Age: 80
End: 2025-02-14
Payer: MEDICARE

## 2025-02-14 VITALS
OXYGEN SATURATION: 96 % | HEART RATE: 65 BPM | HEIGHT: 65 IN | DIASTOLIC BLOOD PRESSURE: 72 MMHG | BODY MASS INDEX: 24.79 KG/M2 | WEIGHT: 148.8 LBS | SYSTOLIC BLOOD PRESSURE: 166 MMHG

## 2025-02-14 DIAGNOSIS — M81.0 AGE-RELATED OSTEOPOROSIS WITHOUT CURRENT PATHOLOGICAL FRACTURE: ICD-10-CM

## 2025-02-14 DIAGNOSIS — E11.65 TYPE 2 DIABETES MELLITUS WITH HYPERGLYCEMIA, WITHOUT LONG-TERM CURRENT USE OF INSULIN: ICD-10-CM

## 2025-02-14 DIAGNOSIS — Z13.220 ENCOUNTER FOR SCREENING FOR LIPID DISORDER: ICD-10-CM

## 2025-02-14 DIAGNOSIS — Z91.89 AT HIGH RISK FOR OSTEOPOROSIS: Primary | ICD-10-CM

## 2025-02-14 NOTE — PROGRESS NOTES
Subjective   Radha Cruz is a 79 y.o. female. Presents today for   Chief Complaint   Patient presents with    Hypertension       History Of Present Illness Radha Calhoun is a 79-year-old female presenting today for 6-month follow-up    Diabetes mellitus type 2  Hypertension  Hypertriglyceridemia    Care gaps:  RSV  COVID    Urine microalbumin - today  Hemoglobin A1c - today  DEXA scan - ordered today  Lipid panel - today    History of Present Illness      Patient Active Problem List   Diagnosis    Atopic rhinitis    Acute otitis media    Bell's palsy    Benign essential hypertension    Cerebral atherosclerosis    Type 2 diabetes mellitus with microalbuminuria, without long-term current use of insulin    Familial hypertriglyceridemia    Low back pain    Palpitations    Saul nodes (DJD hand)       Social History     Socioeconomic History    Marital status:    Tobacco Use    Smoking status: Never    Smokeless tobacco: Never   Vaping Use    Vaping status: Never Used   Substance and Sexual Activity    Alcohol use: Never    Drug use: Never    Sexual activity: Defer       Allergies   Allergen Reactions    Codeine Nausea And Vomiting    Latex Rash    Lisinopril-Hydrochlorothiazide Other (See Comments)     Cough         Current Outpatient Medications on File Prior to Visit   Medication Sig Dispense Refill    atorvastatin (LIPITOR) 40 MG tablet Take 1 tablet by mouth Every Night. 90 tablet 1    Cetirizine HCl 10 MG capsule Take 10 mg by mouth Daily. 90 capsule 0    clopidogrel (PLAVIX) 75 MG tablet Take 1 tablet by mouth once daily 90 tablet 0    clotrimazole (LOTRIMIN) 1 % cream APPLY  CREAM TOPICALLY TWICE DAILY TO AFFECTED AREA AS DIRECTED 60 g 0    colchicine 0.6 MG tablet Take 1 tablet by mouth once daily 30 tablet 1    felodipine (PLENDIL) 10 MG 24 hr tablet Take 1 tablet by mouth once daily 90 tablet 0    furosemide (LASIX) 20 MG tablet TAKE 0.5 (ONE-HALF) TABLET BY MOUTH ONCE DAILY  "45 tablet 0    hydrocortisone 2.5 % cream Apply 1 Application topically to the appropriate area as directed 2 (Two) Times a Day.      ketoconazole (NIZORAL) 2 % shampoo Apply 1 application  topically to the appropriate area as directed 1 (One) Time Per Week.      losartan (COZAAR) 100 MG tablet Take 1 tablet by mouth Daily. 90 tablet 1    magnesium oxide (MAG-OX) 400 MG tablet Take 1 tablet by mouth Daily. 90 tablet 1    meloxicam (MOBIC) 7.5 MG tablet Take 1 tablet by mouth once daily 90 tablet 0    metFORMIN (GLUCOPHAGE) 500 MG tablet TAKE 1 TABLET BY MOUTH THREE TIMES DAILY WITH MEALS 270 tablet 0    metoprolol succinate XL (TOPROL-XL) 100 MG 24 hr tablet Take 1 tablet by mouth once daily 90 tablet 0    nystatin (MYCOSTATIN) 357124 UNIT/GM ointment       potassium chloride (KAYCIEL) 20 mEq/15 mL solution TAKE 15 ML BY MOUTH  ONCE DAILY 480 mL 0    amoxicillin-clavulanate (AUGMENTIN) 875-125 MG per tablet Take 1 tablet by mouth 2 (Two) Times a Day. (Patient not taking: Reported on 2/14/2025) 20 tablet 0     No current facility-administered medications on file prior to visit.       Objective   Vitals:    02/14/25 1057   BP: 166/72   Pulse: 65   SpO2: 96%   Weight: 67.5 kg (148 lb 12.8 oz)   Height: 165.1 cm (65\")     Body mass index is 24.76 kg/m².    Physical Exam    Physical Exam  Constitutional:       Appearance: She is obese.   HENT:      Head: Normocephalic and atraumatic.      Mouth/Throat:      Mouth: Mucous membranes are moist.   Eyes:      Pupils: Pupils are equal, round, and reactive to light.   Cardiovascular:      Rate and Rhythm: Normal rate and regular rhythm.      Pulses: Normal pulses.      Heart sounds: Normal heart sounds.   Pulmonary:      Effort: Pulmonary effort is normal.      Breath sounds: Normal breath sounds.   Abdominal:      General: Bowel sounds are normal.   Musculoskeletal:         General: Normal range of motion.   Skin:     General: Skin is warm and dry.      Capillary Refill: " Capillary refill takes less than 2 seconds.   Neurological:      General: No focal deficit present.      Mental Status: She is alert.   Psychiatric:         Mood and Affect: Mood normal.       Results         Procedures     Assessment & Plan   Diagnoses and all orders for this visit:    1. At high risk for osteoporosis (Primary)  -     DEXA Bone Density Axial    2. Type 2 diabetes mellitus with hyperglycemia, without long-term current use of insulin  -     Hemoglobin A1c  -     CBC & Differential  -     Basic Metabolic Panel  -     Microalbumin / Creatinine Urine Ratio - Urine, Clean Catch    3. Encounter for screening for lipid disorder  -     Lipid Panel    4. Age-related osteoporosis without current pathological fracture  -     DEXA Bone Density Axial         Assessment & Plan    Diagnoses and all orders for this visit:    1. At high risk for osteoporosis (Primary)  -     DEXA Bone Density Axial    2. Type 2 diabetes mellitus with hyperglycemia, without long-term current use of insulin  -     Hemoglobin A1c  -     CBC & Differential  -     Basic Metabolic Panel  -     Microalbumin / Creatinine Urine Ratio - Urine, Clean Catch    3. Encounter for screening for lipid disorder  -     Lipid Panel    4. Age-related osteoporosis without current pathological fracture  -     DEXA Bone Density Axial    Body mass index is 24.76 kg/m².    Discussed Care Gaps, ordered referrals and encouraged vaccination updates.       - Pt agrees with plan of care and denies further questions/concerns today  - This document is intended for medical expert use only. Persons  reading this document without medical staff guidance may result in misinterpretation and unintended morbidity     Go to the ER for any possible life-threatening symptoms such as chest pain or shortness of air.      Please allow 3-5 business days for recommendations based on new results      I personally spent time with this patient, preparing for the visit, reviewing  tests, obtaining and/or reviewing a separately obtained history, performing a medically appropriate examination and/or evaluation, counseling and educating the patient/family/caregiver, ordering medications,  documenting information in the medical record and indepentently interpreting results.                        No follow-ups on file.     Patient or patient representative verbalized consent for the use of Ambient Listening during the visit with  ALONDRA Garsia for chart documentation. 2/14/2025  14:49 EST

## 2025-02-15 LAB
ALBUMIN/CREAT UR: 22 MG/G CREAT (ref 0–29)
BASOPHILS # BLD AUTO: 0.1 X10E3/UL (ref 0–0.2)
BASOPHILS NFR BLD AUTO: 1 %
BUN SERPL-MCNC: 7 MG/DL (ref 8–27)
BUN/CREAT SERPL: 10 (ref 12–28)
CALCIUM SERPL-MCNC: 9.4 MG/DL (ref 8.7–10.3)
CHLORIDE SERPL-SCNC: 101 MMOL/L (ref 96–106)
CHOLEST SERPL-MCNC: 119 MG/DL (ref 100–199)
CO2 SERPL-SCNC: 27 MMOL/L (ref 20–29)
CREAT SERPL-MCNC: 0.69 MG/DL (ref 0.57–1)
CREAT UR-MCNC: 61 MG/DL
EGFRCR SERPLBLD CKD-EPI 2021: 89 ML/MIN/1.73
EOSINOPHIL # BLD AUTO: 0.5 X10E3/UL (ref 0–0.4)
EOSINOPHIL NFR BLD AUTO: 7 %
ERYTHROCYTE [DISTWIDTH] IN BLOOD BY AUTOMATED COUNT: 13.6 % (ref 11.7–15.4)
GLUCOSE SERPL-MCNC: 93 MG/DL (ref 70–99)
HBA1C MFR BLD: 5.9 % (ref 4.8–5.6)
HCT VFR BLD AUTO: 43.7 % (ref 34–46.6)
HDLC SERPL-MCNC: 39 MG/DL
HGB BLD-MCNC: 15 G/DL (ref 11.1–15.9)
IMM GRANULOCYTES # BLD AUTO: 0 X10E3/UL (ref 0–0.1)
IMM GRANULOCYTES NFR BLD AUTO: 0 %
LDLC SERPL CALC-MCNC: 57 MG/DL (ref 0–99)
LYMPHOCYTES # BLD AUTO: 1.9 X10E3/UL (ref 0.7–3.1)
LYMPHOCYTES NFR BLD AUTO: 27 %
MCH RBC QN AUTO: 31.6 PG (ref 26.6–33)
MCHC RBC AUTO-ENTMCNC: 34.3 G/DL (ref 31.5–35.7)
MCV RBC AUTO: 92 FL (ref 79–97)
MICROALBUMIN UR-MCNC: 13.4 UG/ML
MONOCYTES # BLD AUTO: 0.7 X10E3/UL (ref 0.1–0.9)
MONOCYTES NFR BLD AUTO: 10 %
NEUTROPHILS # BLD AUTO: 3.8 X10E3/UL (ref 1.4–7)
NEUTROPHILS NFR BLD AUTO: 55 %
PLATELET # BLD AUTO: 245 X10E3/UL (ref 150–450)
POTASSIUM SERPL-SCNC: 3.9 MMOL/L (ref 3.5–5.2)
RBC # BLD AUTO: 4.75 X10E6/UL (ref 3.77–5.28)
SODIUM SERPL-SCNC: 140 MMOL/L (ref 134–144)
TRIGL SERPL-MCNC: 130 MG/DL (ref 0–149)
VLDLC SERPL CALC-MCNC: 23 MG/DL (ref 5–40)
WBC # BLD AUTO: 6.9 X10E3/UL (ref 3.4–10.8)

## 2025-02-17 NOTE — PROGRESS NOTES
Assessment:  37 y o  [id number] who is POD#4weeks from laparoscopic bilateral salpingectomy for tubal sterilization  Plan:  Diagnoses and all orders for this visit:    Postoperative examination  Status post bilateral salpingectomy  - Appropriate postop exam  - Return for yearly    Menorrhagia with regular cycle  -     Tranexamic Acid 650 MG TABS; Take 2 tablets (1,300 mg total) by mouth 3 (three) times a day for 5 days          __________________________________________________________________    Subjective   Adenike Cordoba is a 37 y o  [id number] who presents POD#4weeks from laparoscopic bilateral salpingectomy for tubal sterilization  Patient reports shes doing well  She notes her pain is absent currently  Her incision is healing well; she denies redness or drainage, but thinks the stitch may be coming through skin  Her bowel function is normal and she is having regular BM's  She has returned to most of her normal activities  Patient inquires about medication to regulate menses  Reports menses last 1-3wks each time  Heavy and very painful  Largely regular, but difficult to tell given prolonged flow  Reviewed options, agreeable to trial of lysteda  Objective  /78   Ht 5' 2" (1 575 m)   Wt 95 3 kg (210 lb)   LMP 04/09/2021 (Within Days)   BMI 38 41 kg/m²      Physical Exam:  Physical Exam  Constitutional:       General: She is not in acute distress  Appearance: Normal appearance  She is not ill-appearing, toxic-appearing or diaphoretic  Eyes:      General: No scleral icterus  Right eye: No discharge  Left eye: No discharge  Conjunctiva/sclera: Conjunctivae normal    Cardiovascular:      Rate and Rhythm: Normal rate  Pulmonary:      Effort: Pulmonary effort is normal  No respiratory distress  Abdominal:      General: There is no distension  Palpations: There is no mass  Tenderness: There is no abdominal tenderness  There is no guarding or rebound  Your hemoglobin A1c is at 5.9 and it was 6.4 a year ago.  Your complete blood cell count is fine at this time.  Your basic metabolic panel is also fine at this time your kidney function looks good.  Your cholesterol levels look good except your HDL cholesterol,which is the good cholesterol could be just a little higher.  You need to dance a little more when you are at the boat.  Your microalbumin urine is fine at this time as well. Hernia: No hernia is present  Comments: Incisions clean, dry, and intact  Small tail of monocryl protruding from bilateral lower quadrant sites; removed with gentle traction  Musculoskeletal:         General: No swelling  Skin:     General: Skin is warm and dry  Coloration: Skin is not jaundiced or pale  Findings: No bruising or erythema  Neurological:      Mental Status: She is alert  Psychiatric:         Mood and Affect: Mood normal          Behavior: Behavior normal          Thought Content:  Thought content normal          Judgment: Judgment normal

## 2025-02-26 DIAGNOSIS — T56.0X1A ACUTE LEAD-INDUCED GOUT INVOLVING TOE OF RIGHT FOOT, INITIAL ENCOUNTER: ICD-10-CM

## 2025-02-26 DIAGNOSIS — I67.2 CEREBRAL ATHEROSCLEROSIS: ICD-10-CM

## 2025-02-26 DIAGNOSIS — M10.171 ACUTE LEAD-INDUCED GOUT INVOLVING TOE OF RIGHT FOOT, INITIAL ENCOUNTER: ICD-10-CM

## 2025-02-26 RX ORDER — CLOPIDOGREL BISULFATE 75 MG/1
75 TABLET ORAL DAILY
Qty: 90 TABLET | Refills: 0 | Status: SHIPPED | OUTPATIENT
Start: 2025-02-26

## 2025-02-26 RX ORDER — COLCHICINE 0.6 MG/1
0.6 TABLET ORAL DAILY
Qty: 30 TABLET | Refills: 0 | Status: SHIPPED | OUTPATIENT
Start: 2025-02-26

## 2025-03-01 DIAGNOSIS — E87.6 HYPOKALEMIA: ICD-10-CM

## 2025-03-02 RX ORDER — POTASSIUM CHLORIDE 20MEQ/15ML
LIQUID (ML) ORAL
Qty: 480 ML | Refills: 0 | Status: SHIPPED | OUTPATIENT
Start: 2025-03-02

## 2025-03-28 ENCOUNTER — RESULTS FOLLOW-UP (OUTPATIENT)
Dept: FAMILY MEDICINE CLINIC | Facility: CLINIC | Age: 80
End: 2025-03-28
Payer: MEDICARE

## 2025-03-28 ENCOUNTER — HOSPITAL ENCOUNTER (OUTPATIENT)
Dept: BONE DENSITY | Facility: HOSPITAL | Age: 80
Discharge: HOME | End: 2025-03-28
Payer: MEDICARE

## 2025-03-28 DIAGNOSIS — E87.6 HYPOKALEMIA: ICD-10-CM

## 2025-03-28 PROCEDURE — 77080 DXA BONE DENSITY AXIAL: CPT

## 2025-03-28 RX ORDER — POTASSIUM CHLORIDE 20MEQ/15ML
LIQUID (ML) ORAL
Qty: 480 ML | Refills: 1 | Status: SHIPPED | OUTPATIENT
Start: 2025-03-28

## 2025-03-28 NOTE — LETTER
Radha RomeroMariana  [address]  [place] [address]    March 28, 2025     Dear Ms. Cruz:    Below are the results from your recent visit:    Your recent bone density scan shows:        IMPRESSION:  Impression:  Normal Bone Mineral Density.      This is from all the dancing you do!  Great news!           Resulted Orders   DEXA Bone Density Axial    Narrative    DEXA BONE DENSITY AXIAL    Date of Exam: 3/28/2025 10:05 AM EDT    Indication: Bone density screening.    Comparison: None available    Findings:  Lumbar Spine  The BMD measured in the L1-L3 region is 1.327 g/cm2 with a T-score of 1.2.  This patient is considered normal according to World Health Organization (WHO) criteria.    Femoral Neck  The BMD measured at the left femoral neck is 0.942 g/cm2 with a T-score of -0.7.  The BMD measured at the right femoral neck is 0.989 g/cm2 with a T-score of -0.4.  The BMD of the mean femoral neck is 0.965 g/cm2 with a T score of -0.5.  This patient is considered normal according to World Health Organization (WHO) criteria.    Total Hip  The BMD of the total left hip is 1.175 g/cm2 with a T-score of 1.3.  The BMD of the total right hip is 1.190 g/cm2 with a T-score of 1.4.  The BMD of the mean total hip is 1.183 g/cm2 with a T-score of 1.4.  This patient is considered normal according to World Health Organization (WHO) criteria.    FRAX Score: The estimated 10 year risk of a major osteoporotic fracture is calculated to be 4.2% and a hip fracture of 0.6%.      Impression    Impression:  Normal Bone Mineral Density.          Report dictated by: Samreen Simmons      I have personally reviewed this case and agree with the findings above:      Electronically Signed: Fernando Dai MD    3/28/2025 11:17 AM EDT    Workstation ID: [id number]     If you have any questions or concerns, please don't hesitate to call.    Sincerely,        ALONDRA Garsia

## 2025-03-28 NOTE — PROGRESS NOTES
Your recent bone density scan shows:       IMPRESSION:  Impression:  Normal Bone Mineral Density.      This is from all the dancing you do!  Great news!

## 2025-03-31 DIAGNOSIS — M10.171 ACUTE LEAD-INDUCED GOUT INVOLVING TOE OF RIGHT FOOT, INITIAL ENCOUNTER: ICD-10-CM

## 2025-03-31 DIAGNOSIS — T56.0X1A ACUTE LEAD-INDUCED GOUT INVOLVING TOE OF RIGHT FOOT, INITIAL ENCOUNTER: ICD-10-CM

## 2025-03-31 DIAGNOSIS — I10 BENIGN ESSENTIAL HYPERTENSION: ICD-10-CM

## 2025-03-31 RX ORDER — COLCHICINE 0.6 MG/1
0.6 TABLET ORAL DAILY
Qty: 30 TABLET | Refills: 0 | Status: SHIPPED | OUTPATIENT
Start: 2025-03-31

## 2025-03-31 RX ORDER — LOSARTAN POTASSIUM 100 MG/1
100 TABLET ORAL DAILY
Qty: 90 TABLET | Refills: 0 | Status: SHIPPED | OUTPATIENT
Start: 2025-03-31

## 2025-04-27 DIAGNOSIS — M15.2 BOUCHARD NODES (DJD HAND): ICD-10-CM

## 2025-04-27 DIAGNOSIS — T56.0X1A ACUTE LEAD-INDUCED GOUT INVOLVING TOE OF RIGHT FOOT, INITIAL ENCOUNTER: ICD-10-CM

## 2025-04-27 DIAGNOSIS — E78.5 DYSLIPIDEMIA: ICD-10-CM

## 2025-04-27 DIAGNOSIS — I10 BENIGN ESSENTIAL HYPERTENSION: ICD-10-CM

## 2025-04-27 DIAGNOSIS — I67.2 CEREBRAL ATHEROSCLEROSIS: ICD-10-CM

## 2025-04-27 DIAGNOSIS — M10.171 ACUTE LEAD-INDUCED GOUT INVOLVING TOE OF RIGHT FOOT, INITIAL ENCOUNTER: ICD-10-CM

## 2025-04-27 RX ORDER — COLCHICINE 0.6 MG/1
0.6 TABLET ORAL DAILY
Qty: 30 TABLET | Refills: 3 | Status: SHIPPED | OUTPATIENT
Start: 2025-04-27

## 2025-04-27 RX ORDER — MELOXICAM 7.5 MG/1
7.5 TABLET ORAL DAILY
Qty: 90 TABLET | Refills: 1 | Status: SHIPPED | OUTPATIENT
Start: 2025-04-27

## 2025-04-27 RX ORDER — FELODIPINE 10 MG/1
10 TABLET, EXTENDED RELEASE ORAL DAILY
Qty: 90 TABLET | Refills: 1 | Status: SHIPPED | OUTPATIENT
Start: 2025-04-27

## 2025-04-27 RX ORDER — ATORVASTATIN CALCIUM 40 MG/1
40 TABLET, FILM COATED ORAL NIGHTLY
Qty: 90 TABLET | Refills: 1 | Status: SHIPPED | OUTPATIENT
Start: 2025-04-27

## 2025-05-03 DIAGNOSIS — R80.9 TYPE 2 DIABETES MELLITUS WITH MICROALBUMINURIA, WITHOUT LONG-TERM CURRENT USE OF INSULIN: ICD-10-CM

## 2025-05-03 DIAGNOSIS — E11.29 TYPE 2 DIABETES MELLITUS WITH MICROALBUMINURIA, WITHOUT LONG-TERM CURRENT USE OF INSULIN: ICD-10-CM

## 2025-05-06 DIAGNOSIS — E11.29 TYPE 2 DIABETES MELLITUS WITH MICROALBUMINURIA, WITHOUT LONG-TERM CURRENT USE OF INSULIN: ICD-10-CM

## 2025-05-06 DIAGNOSIS — R80.9 TYPE 2 DIABETES MELLITUS WITH MICROALBUMINURIA, WITHOUT LONG-TERM CURRENT USE OF INSULIN: ICD-10-CM

## 2025-05-09 ENCOUNTER — EXTERNAL PBMM DATA (OUTPATIENT)
Dept: PHARMACY | Facility: OTHER | Age: 80
End: 2025-05-09
Payer: MEDICARE

## 2025-06-16 ENCOUNTER — OFFICE VISIT (OUTPATIENT)
Dept: FAMILY MEDICINE CLINIC | Facility: CLINIC | Age: 80
End: 2025-06-16
Payer: MEDICARE

## 2025-06-16 VITALS
DIASTOLIC BLOOD PRESSURE: 90 MMHG | WEIGHT: 147.6 LBS | SYSTOLIC BLOOD PRESSURE: 164 MMHG | HEART RATE: 66 BPM | BODY MASS INDEX: 24.59 KG/M2 | OXYGEN SATURATION: 94 % | HEIGHT: 65 IN

## 2025-06-16 DIAGNOSIS — M54.32 SCIATICA OF LEFT SIDE: Primary | ICD-10-CM

## 2025-06-16 PROCEDURE — 99213 OFFICE O/P EST LOW 20 MIN: CPT | Performed by: NURSE PRACTITIONER

## 2025-06-16 PROCEDURE — 1160F RVW MEDS BY RX/DR IN RCRD: CPT | Performed by: NURSE PRACTITIONER

## 2025-06-16 PROCEDURE — 3077F SYST BP >= 140 MM HG: CPT | Performed by: NURSE PRACTITIONER

## 2025-06-16 PROCEDURE — 1126F AMNT PAIN NOTED NONE PRSNT: CPT | Performed by: NURSE PRACTITIONER

## 2025-06-16 PROCEDURE — 96372 THER/PROPH/DIAG INJ SC/IM: CPT | Performed by: NURSE PRACTITIONER

## 2025-06-16 PROCEDURE — 3080F DIAST BP >= 90 MM HG: CPT | Performed by: NURSE PRACTITIONER

## 2025-06-16 PROCEDURE — 1159F MED LIST DOCD IN RCRD: CPT | Performed by: NURSE PRACTITIONER

## 2025-06-16 RX ORDER — TRIAMCINOLONE ACETONIDE 40 MG/ML
40 INJECTION, SUSPENSION INTRA-ARTICULAR; INTRAMUSCULAR ONCE
Status: COMPLETED | OUTPATIENT
Start: 2025-06-16 | End: 2025-06-16

## 2025-06-16 RX ORDER — IBUPROFEN 800 MG/1
800 TABLET, FILM COATED ORAL EVERY 6 HOURS PRN
Qty: 120 TABLET | Refills: 1 | Status: SHIPPED | OUTPATIENT
Start: 2025-06-16

## 2025-06-16 RX ADMIN — TRIAMCINOLONE ACETONIDE 40 MG: 40 INJECTION, SUSPENSION INTRA-ARTICULAR; INTRAMUSCULAR at 14:11

## 2025-06-16 NOTE — PROGRESS NOTES
Subjective   Radha Cruz is a 79 y.o. female. Presents today for   Chief Complaint   Patient presents with    Back Pain     Sciatic Nerve pain    X's 3 weeks or so but has worsened          History Of Present Illness Radha Calhoun is a 79-year-old female presenting today with sciatic nerve pain    History of Present Illness  The patient presents for evaluation of sciatica.    She has been experiencing a progressive worsening of her sciatica over the past 3 weeks, which has significantly disrupted her sleep. Despite her high pain tolerance, she finds the current level of discomfort intolerable. She reports that the pain was so severe this morning that she was unable to attend her gym session. She has been managing the pain with Aleve and Motrin 800 mg but is seeking additional relief through a cortisone injection. She has declined the use of gabapentin due to concerns about potential side effects. She also notes an increase in her blood pressure, which she attributes to the pain. She has been engaging in exercises to alleviate the pain, including lying flat and elevating her leg, but reports persistent tightness in the affected area. She has been managing the pain with Aleve and Motrin 800 mg.    Supplemental Information  She is also experiencing symptoms of allergies, including runny eyes and nose, which she believes are exacerbated by the current weather conditions.    MEDICATIONS  Current: Aleve, Motrin    Patient Active Problem List   Diagnosis    Atopic rhinitis    Acute otitis media    Bell's palsy    Benign essential hypertension    Cerebral atherosclerosis    Type 2 diabetes mellitus with microalbuminuria, without long-term current use of insulin    Familial hypertriglyceridemia    Low back pain    Palpitations    Saul nodes (DJD hand)       Social History     Socioeconomic History    Marital status:    Tobacco Use    Smoking status: Never    Smokeless tobacco: Never    Vaping Use    Vaping status: Never Used   Substance and Sexual Activity    Alcohol use: Never    Drug use: Never    Sexual activity: Defer       Allergies   Allergen Reactions    Codeine Nausea And Vomiting    Latex Rash    Lisinopril-Hydrochlorothiazide Other (See Comments)     Cough         Current Outpatient Medications on File Prior to Visit   Medication Sig Dispense Refill    atorvastatin (LIPITOR) 40 MG tablet TAKE 1 TABLET BY MOUTH ONCE DAILY AT NIGHT 90 tablet 1    Cetirizine HCl 10 MG capsule Take 10 mg by mouth Daily. 90 capsule 0    clopidogrel (PLAVIX) 75 MG tablet Take 1 tablet by mouth once daily 90 tablet 0    clopidogrel (PLAVIX) 75 MG tablet Take 1 tablet by mouth once daily 90 tablet 0    clotrimazole (LOTRIMIN) 1 % cream APPLY  CREAM TOPICALLY TWICE DAILY TO AFFECTED AREA AS DIRECTED 60 g 0    colchicine 0.6 MG tablet Take 1 tablet by mouth once daily 30 tablet 3    felodipine (PLENDIL) 10 MG 24 hr tablet Take 1 tablet by mouth once daily 90 tablet 1    furosemide (LASIX) 20 MG tablet TAKE 0.5 (ONE-HALF) TABLET BY MOUTH ONCE DAILY 45 tablet 0    hydrocortisone 2.5 % cream Apply 1 Application topically to the appropriate area as directed 2 (Two) Times a Day.      ketoconazole (NIZORAL) 2 % shampoo Apply 1 application  topically to the appropriate area as directed 1 (One) Time Per Week.      losartan (COZAAR) 100 MG tablet Take 1 tablet by mouth once daily 90 tablet 0    magnesium oxide (MAG-OX) 400 MG tablet Take 1 tablet by mouth Daily. 90 tablet 1    meloxicam (MOBIC) 7.5 MG tablet Take 1 tablet by mouth once daily 90 tablet 1    metFORMIN (GLUCOPHAGE) 500 MG tablet TAKE 1 TABLET BY MOUTH THREE TIMES DAILY WITH MEALS 270 tablet 0    metoprolol succinate XL (TOPROL-XL) 100 MG 24 hr tablet Take 1 tablet by mouth once daily 90 tablet 0    nystatin (MYCOSTATIN) 687467 UNIT/GM ointment       potassium chloride (KAYCIEL) 20 mEq/15 mL solution TAKE 15 ML BY MOUTH  ONCE DAILY 480 mL 1     "amoxicillin-clavulanate (AUGMENTIN) 875-125 MG per tablet Take 1 tablet by mouth 2 (Two) Times a Day. (Patient not taking: Reported on 6/16/2025) 20 tablet 0     No current facility-administered medications on file prior to visit.       Objective   Vitals:    06/16/25 1325   BP: 164/90   Pulse: 66   SpO2: 94%   Weight: 67 kg (147 lb 9.6 oz)   Height: 165.1 cm (65\")     Body mass index is 24.56 kg/m².    Physical Exam    Physical Exam  Constitutional:       Appearance: She is obese.   HENT:      Head: Normocephalic and atraumatic.      Mouth/Throat:      Mouth: Mucous membranes are moist.   Eyes:      Pupils: Pupils are equal, round, and reactive to light.   Cardiovascular:      Rate and Rhythm: Normal rate and regular rhythm.      Pulses: Normal pulses.      Heart sounds: Normal heart sounds.   Pulmonary:      Effort: Pulmonary effort is normal.      Breath sounds: Normal breath sounds.   Abdominal:      General: Bowel sounds are normal.   Musculoskeletal:      Comments: Painful upon raising left leg - she feels like it is not a part of her, other than the pain.  The back of the leg is tight.  She continues to perform exercises to try to work it out.   Skin:     General: Skin is warm and dry.      Capillary Refill: Capillary refill takes less than 2 seconds.   Neurological:      General: No focal deficit present.      Mental Status: She is alert.   Psychiatric:         Mood and Affect: Mood normal.         Results         Procedures     Assessment & Plan   Diagnoses and all orders for this visit:    1. Sciatica of left side (Primary)  -     triamcinolone acetonide (KENALOG-40) injection 40 mg  -     ibuprofen (ADVIL,MOTRIN) 800 MG tablet; Take 1 tablet by mouth Every 6 (Six) Hours As Needed for Mild Pain.  Dispense: 120 tablet; Refill: 1         Assessment & Plan  1. Sciatica.  The patient reports worsening pain over the last 3 weeks, which has been severe enough to disrupt sleep and daily activities. She has been " managing with Aleve and 800 mg Motrin but requires further intervention. A Kenalog injection will be administered today to help alleviate the pain. A prescription for ibuprofen 800 mg has been sent to [facility] pharmacy. She is advised to continue using Motrin as needed for pain management.           BMI is within normal parameters. No other follow-up for BMI required.     Return if symptoms worsen or fail to improve.     Discussed Care Gaps, ordered referrals and encouraged vaccination updates.       - Pt agrees with plan of care and denies further questions/concerns today  - This document is intended for medical expert use only. Persons  reading this document without medical staff guidance may result in misinterpretation and unintended morbidity     Go to the ER for any possible life-threatening symptoms such as chest pain or shortness of air.      Please allow 3-5 business days for recommendations based on new results      I personally spent time with this patient, preparing for the visit, reviewing tests, obtaining and/or reviewing a separately obtained history, performing a medically appropriate examination and/or evaluation, counseling and educating the patient/family/caregiver, ordering medications,  documenting information in the medical record and indepentently interpreting results.         Patient or patient representative verbalized consent for the use of Ambient Listening during the visit with  ALONDRA Garsia for chart documentation. 6/16/2025  15:37 EDT

## 2025-06-28 DIAGNOSIS — R60.0 BILATERAL EDEMA OF LOWER EXTREMITY: ICD-10-CM

## 2025-06-28 DIAGNOSIS — I10 BENIGN ESSENTIAL HYPERTENSION: ICD-10-CM

## 2025-06-29 RX ORDER — LOSARTAN POTASSIUM 100 MG/1
100 TABLET ORAL DAILY
Qty: 90 TABLET | Refills: 0 | Status: SHIPPED | OUTPATIENT
Start: 2025-06-29

## 2025-06-29 RX ORDER — FUROSEMIDE 20 MG/1
10 TABLET ORAL DAILY
Qty: 45 TABLET | Refills: 0 | Status: SHIPPED | OUTPATIENT
Start: 2025-06-29

## 2025-07-08 ENCOUNTER — OFFICE VISIT (OUTPATIENT)
Dept: FAMILY MEDICINE CLINIC | Facility: CLINIC | Age: 80
End: 2025-07-08
Payer: MEDICARE

## 2025-07-08 VITALS
BODY MASS INDEX: 23.46 KG/M2 | SYSTOLIC BLOOD PRESSURE: 144 MMHG | DIASTOLIC BLOOD PRESSURE: 84 MMHG | OXYGEN SATURATION: 99 % | HEIGHT: 65 IN | WEIGHT: 140.8 LBS | HEART RATE: 58 BPM

## 2025-07-08 DIAGNOSIS — J30.2 SEASONAL ALLERGIES: ICD-10-CM

## 2025-07-08 DIAGNOSIS — R05.1 ACUTE COUGH: Primary | ICD-10-CM

## 2025-07-08 PROCEDURE — 1160F RVW MEDS BY RX/DR IN RCRD: CPT | Performed by: NURSE PRACTITIONER

## 2025-07-08 PROCEDURE — 99214 OFFICE O/P EST MOD 30 MIN: CPT | Performed by: NURSE PRACTITIONER

## 2025-07-08 PROCEDURE — 3077F SYST BP >= 140 MM HG: CPT | Performed by: NURSE PRACTITIONER

## 2025-07-08 PROCEDURE — 1126F AMNT PAIN NOTED NONE PRSNT: CPT | Performed by: NURSE PRACTITIONER

## 2025-07-08 PROCEDURE — 1159F MED LIST DOCD IN RCRD: CPT | Performed by: NURSE PRACTITIONER

## 2025-07-08 PROCEDURE — 3079F DIAST BP 80-89 MM HG: CPT | Performed by: NURSE PRACTITIONER

## 2025-07-08 RX ORDER — OLOPATADINE HYDROCHLORIDE 1 MG/ML
1 SOLUTION OPHTHALMIC 2 TIMES DAILY
Qty: 5 ML | Refills: 12 | Status: SHIPPED | OUTPATIENT
Start: 2025-07-08

## 2025-07-08 RX ORDER — BROMPHENIRAMINE MALEATE, PSEUDOEPHEDRINE HYDROCHLORIDE, AND DEXTROMETHORPHAN HYDROBROMIDE 2; 30; 10 MG/5ML; MG/5ML; MG/5ML
5 SYRUP ORAL 4 TIMES DAILY PRN
Qty: 250 ML | Refills: 0 | Status: SHIPPED | OUTPATIENT
Start: 2025-07-08

## 2025-07-08 RX ORDER — CLINDAMYCIN HYDROCHLORIDE 300 MG/1
300 CAPSULE ORAL 3 TIMES DAILY
Qty: 21 CAPSULE | Refills: 0 | Status: SHIPPED | OUTPATIENT
Start: 2025-07-08

## 2025-07-08 RX ORDER — GUAIFENESIN 600 MG/1
1200 TABLET, EXTENDED RELEASE ORAL 2 TIMES DAILY
Qty: 40 TABLET | Refills: 0 | Status: SHIPPED | OUTPATIENT
Start: 2025-07-08

## 2025-07-25 ENCOUNTER — TELEPHONE (OUTPATIENT)
Dept: FAMILY MEDICINE CLINIC | Facility: CLINIC | Age: 80
End: 2025-07-25
Payer: MEDICARE

## 2025-07-25 DIAGNOSIS — N95.0 POSTMENOPAUSAL BLEEDING: Primary | ICD-10-CM

## 2025-07-25 DIAGNOSIS — M62.9 DISORDER OF MUSCLE, UNSPECIFIED: ICD-10-CM

## 2025-07-25 NOTE — TELEPHONE ENCOUNTER
This needs evaluated.  Have stop in for blood work.  I ordered an ultrasound to evaluate and also placed referral to gynecology to examine/evaluate.  Even if stops, needs to get this done.   RRJ

## 2025-07-25 NOTE — TELEPHONE ENCOUNTER
PATIENT CALLED AND STATES SHE HAD SOME MODERATE VAGINAL BLEEDING LAST NIGHT AND CONTINUING THIS MORNING. THE BLOOD IS DARK RED.  NO PAIN, NO DISCOMFORT. BOWELS MOVED NOT BLOOD IN STOOL.    NO APPOINTMENT AVAILABLE TODAY.    PLEASE CALL AND ADVISE [phone]

## 2025-07-27 DIAGNOSIS — E87.6 HYPOKALEMIA: ICD-10-CM

## 2025-07-27 RX ORDER — POTASSIUM CHLORIDE 20MEQ/15ML
LIQUID (ML) ORAL
Qty: 473 ML | Refills: 0 | Status: SHIPPED | OUTPATIENT
Start: 2025-07-27

## 2025-07-29 DIAGNOSIS — R00.2 PALPITATIONS: ICD-10-CM

## 2025-07-29 LAB
ALBUMIN SERPL-MCNC: 4.2 G/DL (ref 3.5–5.2)
ALBUMIN/GLOB SERPL: 1.1 G/DL
ALP SERPL-CCNC: 97 U/L (ref 39–117)
ALT SERPL-CCNC: 20 U/L (ref 1–33)
AST SERPL-CCNC: 32 U/L (ref 1–32)
BASOPHILS # BLD AUTO: 0.04 10*3/MM3 (ref 0–0.2)
BASOPHILS NFR BLD AUTO: 0.6 % (ref 0–1.5)
BILIRUB SERPL-MCNC: 0.8 MG/DL (ref 0–1.2)
BUN SERPL-MCNC: 10 MG/DL (ref 8–23)
BUN/CREAT SERPL: 12.5 (ref 7–25)
CALCIUM SERPL-MCNC: 9.5 MG/DL (ref 8.6–10.5)
CHLORIDE SERPL-SCNC: 97 MMOL/L (ref 98–107)
CO2 SERPL-SCNC: 27.3 MMOL/L (ref 22–29)
CREAT SERPL-MCNC: 0.8 MG/DL (ref 0.57–1)
EGFRCR SERPLBLD CKD-EPI 2021: 76 ML/MIN/1.73
EOSINOPHIL # BLD AUTO: 0.38 10*3/MM3 (ref 0–0.4)
EOSINOPHIL NFR BLD AUTO: 5.5 % (ref 0.3–6.2)
ERYTHROCYTE [DISTWIDTH] IN BLOOD BY AUTOMATED COUNT: 14.3 % (ref 12.3–15.4)
GLOBULIN SER CALC-MCNC: 3.8 GM/DL
GLUCOSE SERPL-MCNC: 77 MG/DL (ref 65–99)
HCT VFR BLD AUTO: 43.4 % (ref 34–46.6)
HGB BLD-MCNC: 14.4 G/DL (ref 12–15.9)
IMM GRANULOCYTES # BLD AUTO: 0.02 10*3/MM3 (ref 0–0.05)
IMM GRANULOCYTES NFR BLD AUTO: 0.3 % (ref 0–0.5)
LYMPHOCYTES # BLD AUTO: 1.66 10*3/MM3 (ref 0.7–3.1)
LYMPHOCYTES NFR BLD AUTO: 24.1 % (ref 19.6–45.3)
MCH RBC QN AUTO: 30.7 PG (ref 26.6–33)
MCHC RBC AUTO-ENTMCNC: 33.2 G/DL (ref 31.5–35.7)
MCV RBC AUTO: 92.5 FL (ref 79–97)
MONOCYTES # BLD AUTO: 0.64 10*3/MM3 (ref 0.1–0.9)
MONOCYTES NFR BLD AUTO: 9.3 % (ref 5–12)
NEUTROPHILS # BLD AUTO: 4.14 10*3/MM3 (ref 1.7–7)
NEUTROPHILS NFR BLD AUTO: 60.2 % (ref 42.7–76)
NRBC BLD AUTO-RTO: 0 /100 WBC (ref 0–0.2)
PLATELET # BLD AUTO: 212 10*3/MM3 (ref 140–450)
POTASSIUM SERPL-SCNC: 4.2 MMOL/L (ref 3.5–5.2)
PROT SERPL-MCNC: 8 G/DL (ref 6–8.5)
RBC # BLD AUTO: 4.69 10*6/MM3 (ref 3.77–5.28)
SODIUM SERPL-SCNC: 138 MMOL/L (ref 136–145)
TSH SERPL DL<=0.005 MIU/L-ACNC: 0.92 UIU/ML (ref 0.27–4.2)
WBC # BLD AUTO: 6.88 10*3/MM3 (ref 3.4–10.8)

## 2025-07-29 RX ORDER — METOPROLOL SUCCINATE 100 MG/1
100 TABLET, EXTENDED RELEASE ORAL DAILY
Qty: 90 TABLET | Refills: 1 | Status: SHIPPED | OUTPATIENT
Start: 2025-07-29

## 2025-08-03 ENCOUNTER — RESULTS FOLLOW-UP (OUTPATIENT)
Dept: FAMILY MEDICINE CLINIC | Facility: CLINIC | Age: 80
End: 2025-08-03
Payer: MEDICARE

## 2025-08-03 NOTE — PROGRESS NOTES
Mail copy of results to patient.  Thyroid normal  Kidney and liver function normal  Blood counts normal

## 2025-08-03 NOTE — LETTER
Radha COTTRELL Anthony  [address] [place] [address]    August 3, 2025     Dear Ms. Cruz:    Below are the results from your recent visit:    Thyroid normal  Kidney and liver function normal  Blood counts normal    Resulted Orders   CBC & Differential   Result Value Ref Range    WBC 6.88 3.40 - 10.80 10*3/mm3    RBC 4.69 3.77 - 5.28 10*6/mm3    Hemoglobin 14.4 12.0 - 15.9 g/dL    Hematocrit 43.4 34.0 - 46.6 %    MCV 92.5 79.0 - 97.0 fL    MCH 30.7 26.6 - 33.0 pg    MCHC 33.2 31.5 - 35.7 g/dL    RDW 14.3 12.3 - 15.4 %    Platelets 212 140 - 450 10*3/mm3    Neutrophil Rel % 60.2 42.7 - 76.0 %    Lymphocyte Rel % 24.1 19.6 - 45.3 %    Monocyte Rel % 9.3 5.0 - 12.0 %    Eosinophil Rel % 5.5 0.3 - 6.2 %    Basophil Rel % 0.6 0.0 - 1.5 %    Neutrophils Absolute 4.14 1.70 - 7.00 10*3/mm3    Lymphocytes Absolute 1.66 0.70 - 3.10 10*3/mm3    Monocytes Absolute 0.64 0.10 - 0.90 10*3/mm3    Eosinophils Absolute 0.38 0.00 - 0.40 10*3/mm3    Basophils Absolute 0.04 0.00 - 0.20 10*3/mm3    Immature Granulocyte Rel % 0.3 0.0 - 0.5 %    Immature Grans Absolute 0.02 0.00 - 0.05 10*3/mm3    nRBC 0.0 0.0 - 0.2 /100 WBC   Comprehensive Metabolic Panel   Result Value Ref Range    Glucose 77 65 - 99 mg/dL    BUN 10.0 8.0 - 23.0 mg/dL    Creatinine 0.80 0.57 - 1.00 mg/dL    EGFR Result 76.0 >60.0 mL/min/1.73      Comment:      GFR Categories in Chronic Kidney Disease (CKD)  GFR Category          GFR (mL/min/1.73)    Interpretation  G1                    90 or greater        Normal or high  (1)  G2                    60-89                Mild decrease  (1)  G3a                   45-59                Mild to moderate  decrease  G3b                   30-44                Moderate to  severe decrease  G4                    15-29                Severe decrease  G5                    14 or less           Kidney failure  (1)In the absence of evidence of kidney disease, neither  GFR category G1 or G2 fulfill the criteria for  CKD.  eGFR calculation 2021 CKD-EPI creatinine equation, which  does not include race as a factor      BUN/Creatinine Ratio 12.5 7.0 - 25.0    Sodium 138 136 - 145 mmol/L    Potassium 4.2 3.5 - 5.2 mmol/L    Chloride 97 (L) 98 - 107 mmol/L    Total CO2 27.3 22.0 - 29.0 mmol/L    Calcium 9.5 8.6 - 10.5 mg/dL    Total Protein 8.0 6.0 - 8.5 g/dL    Albumin 4.2 3.5 - 5.2 g/dL    Globulin 3.8 gm/dL    A/G Ratio 1.1 g/dL    Total Bilirubin 0.8 0.0 - 1.2 mg/dL    Alkaline Phosphatase 97 39 - 117 U/L    AST (SGOT) 32 1 - 32 U/L    ALT (SGPT) 20 1 - 33 U/L   TSH   Result Value Ref Range    TSH 0.921 0.270 - 4.200 uIU/mL     If you have any questions or concerns, please don't hesitate to call.    Sincerely,        Jose Urbano, DO

## 2025-08-08 ENCOUNTER — HOSPITAL ENCOUNTER (OUTPATIENT)
Dept: ULTRASOUND IMAGING | Facility: HOSPITAL | Age: 80
Discharge: HOME | End: 2025-08-08
Payer: MEDICARE

## 2025-08-08 DIAGNOSIS — N95.0 POSTMENOPAUSAL BLEEDING: ICD-10-CM

## 2025-08-08 PROCEDURE — 76830 TRANSVAGINAL US NON-OB: CPT

## 2025-08-15 ENCOUNTER — OFFICE VISIT (OUTPATIENT)
Dept: OBSTETRICS AND GYNECOLOGY | Facility: CLINIC | Age: 80
End: 2025-08-15
Payer: MEDICARE

## 2025-08-15 VITALS
BODY MASS INDEX: 23.49 KG/M2 | SYSTOLIC BLOOD PRESSURE: 211 MMHG | HEIGHT: 65 IN | DIASTOLIC BLOOD PRESSURE: 81 MMHG | WEIGHT: 141 LBS

## 2025-08-15 DIAGNOSIS — R93.89 ENDOMETRIAL THICKENING ON ULTRASOUND: ICD-10-CM

## 2025-08-15 DIAGNOSIS — N95.0 POSTMENOPAUSAL BLEEDING: Primary | ICD-10-CM

## 2025-08-18 PROBLEM — N95.0 POSTMENOPAUSAL BLEEDING: Status: ACTIVE | Noted: 2025-08-15

## 2025-08-18 PROBLEM — R93.89 ENDOMETRIAL THICKENING ON ULTRASOUND: Status: ACTIVE | Noted: 2025-08-15

## 2025-08-18 RX ORDER — SODIUM CHLORIDE 9 MG/ML
40 INJECTION, SOLUTION INTRAVENOUS AS NEEDED
OUTPATIENT
Start: 2025-08-18

## 2025-08-18 RX ORDER — SODIUM CHLORIDE 0.9 % (FLUSH) 0.9 %
3 SYRINGE (ML) INJECTION EVERY 12 HOURS SCHEDULED
OUTPATIENT
Start: 2025-08-18

## 2025-08-18 RX ORDER — SODIUM CHLORIDE 0.9 % (FLUSH) 0.9 %
10 SYRINGE (ML) INJECTION AS NEEDED
OUTPATIENT
Start: 2025-08-18

## 2025-08-19 ENCOUNTER — PATIENT MESSAGE (OUTPATIENT)
Dept: OBSTETRICS AND GYNECOLOGY | Facility: CLINIC | Age: 80
End: 2025-08-19
Payer: MEDICARE

## 2025-08-19 ENCOUNTER — PATIENT ROUNDING (BHMG ONLY) (OUTPATIENT)
Dept: OBSTETRICS AND GYNECOLOGY | Facility: CLINIC | Age: 80
End: 2025-08-19
Payer: MEDICARE

## 2025-08-20 ENCOUNTER — TELEPHONE (OUTPATIENT)
Dept: OBSTETRICS AND GYNECOLOGY | Facility: CLINIC | Age: 80
End: 2025-08-20
Payer: MEDICARE

## 2025-08-20 LAB
DX ICD CODE: NORMAL
PATH REPORT.FINAL DX SPEC: NORMAL
PATH REPORT.GROSS SPEC: NORMAL
PATH REPORT.SITE OF ORIGIN SPEC: NORMAL
PATHOLOGIST NAME: NORMAL
PAYMENT PROCEDURE: NORMAL

## 2025-08-22 ENCOUNTER — OFFICE VISIT (OUTPATIENT)
Dept: FAMILY MEDICINE CLINIC | Facility: CLINIC | Age: 80
End: 2025-08-22
Payer: MEDICARE

## 2025-08-22 VITALS
HEIGHT: 65 IN | WEIGHT: 139 LBS | BODY MASS INDEX: 23.16 KG/M2 | HEART RATE: 58 BPM | OXYGEN SATURATION: 100 % | DIASTOLIC BLOOD PRESSURE: 84 MMHG | SYSTOLIC BLOOD PRESSURE: 184 MMHG

## 2025-08-22 DIAGNOSIS — M25.552 PAIN OF LEFT HIP: ICD-10-CM

## 2025-08-22 DIAGNOSIS — I67.2 CEREBRAL ATHEROSCLEROSIS: ICD-10-CM

## 2025-08-22 DIAGNOSIS — E11.65 TYPE 2 DIABETES MELLITUS WITH HYPERGLYCEMIA, WITHOUT LONG-TERM CURRENT USE OF INSULIN: Primary | ICD-10-CM

## 2025-08-22 RX ORDER — CLOPIDOGREL BISULFATE 75 MG/1
75 TABLET ORAL DAILY
Qty: 90 TABLET | Refills: 0 | Status: SHIPPED | OUTPATIENT
Start: 2025-08-22

## 2025-08-22 RX ORDER — KETOROLAC TROMETHAMINE 30 MG/ML
30 INJECTION, SOLUTION INTRAMUSCULAR; INTRAVENOUS ONCE
Status: COMPLETED | OUTPATIENT
Start: 2025-08-22 | End: 2025-08-22

## 2025-08-22 RX ADMIN — KETOROLAC TROMETHAMINE 30 MG: 30 INJECTION, SOLUTION INTRAMUSCULAR; INTRAVENOUS at 11:33

## 2025-08-27 DIAGNOSIS — M10.171 ACUTE LEAD-INDUCED GOUT INVOLVING TOE OF RIGHT FOOT, INITIAL ENCOUNTER: ICD-10-CM

## 2025-08-27 DIAGNOSIS — T56.0X1A ACUTE LEAD-INDUCED GOUT INVOLVING TOE OF RIGHT FOOT, INITIAL ENCOUNTER: ICD-10-CM

## 2025-08-27 RX ORDER — COLCHICINE 0.6 MG/1
0.6 TABLET ORAL DAILY
Qty: 30 TABLET | Refills: 2 | Status: SHIPPED | OUTPATIENT
Start: 2025-08-27